# Patient Record
Sex: FEMALE | Race: WHITE | NOT HISPANIC OR LATINO
[De-identification: names, ages, dates, MRNs, and addresses within clinical notes are randomized per-mention and may not be internally consistent; named-entity substitution may affect disease eponyms.]

---

## 2017-03-02 ENCOUNTER — RX RENEWAL (OUTPATIENT)
Age: 67
End: 2017-03-02

## 2017-03-29 ENCOUNTER — APPOINTMENT (OUTPATIENT)
Dept: INTERNAL MEDICINE | Facility: CLINIC | Age: 67
End: 2017-03-29

## 2017-03-29 VITALS
BODY MASS INDEX: 26.99 KG/M2 | DIASTOLIC BLOOD PRESSURE: 60 MMHG | SYSTOLIC BLOOD PRESSURE: 120 MMHG | WEIGHT: 162 LBS | HEART RATE: 66 BPM | HEIGHT: 65 IN

## 2017-04-04 ENCOUNTER — TRANSCRIPTION ENCOUNTER (OUTPATIENT)
Age: 67
End: 2017-04-04

## 2017-04-09 ENCOUNTER — TRANSCRIPTION ENCOUNTER (OUTPATIENT)
Age: 67
End: 2017-04-09

## 2017-04-21 ENCOUNTER — APPOINTMENT (OUTPATIENT)
Dept: OPHTHALMOLOGY | Facility: CLINIC | Age: 67
End: 2017-04-21

## 2017-04-25 ENCOUNTER — APPOINTMENT (OUTPATIENT)
Dept: OBGYN | Facility: CLINIC | Age: 67
End: 2017-04-25

## 2017-04-25 ENCOUNTER — MEDICATION RENEWAL (OUTPATIENT)
Age: 67
End: 2017-04-25

## 2017-04-25 VITALS
DIASTOLIC BLOOD PRESSURE: 62 MMHG | HEIGHT: 65 IN | SYSTOLIC BLOOD PRESSURE: 132 MMHG | BODY MASS INDEX: 26.66 KG/M2 | WEIGHT: 160 LBS

## 2017-04-25 DIAGNOSIS — Z01.419 ENCOUNTER FOR GYNECOLOGICAL EXAMINATION (GENERAL) (ROUTINE) W/OUT ABNORMAL FINDINGS: ICD-10-CM

## 2017-04-30 LAB — CYTOLOGY CVX/VAG DOC THIN PREP: NORMAL

## 2017-06-20 ENCOUNTER — RX RENEWAL (OUTPATIENT)
Age: 67
End: 2017-06-20

## 2017-08-31 ENCOUNTER — MEDICATION RENEWAL (OUTPATIENT)
Age: 67
End: 2017-08-31

## 2017-08-31 ENCOUNTER — TRANSCRIPTION ENCOUNTER (OUTPATIENT)
Age: 67
End: 2017-08-31

## 2017-10-02 ENCOUNTER — APPOINTMENT (OUTPATIENT)
Dept: INTERNAL MEDICINE | Facility: CLINIC | Age: 67
End: 2017-10-02

## 2017-11-20 PROBLEM — H04.202 LEFT EPIPHORA: Status: ACTIVE | Noted: 2017-04-21

## 2017-11-21 ENCOUNTER — APPOINTMENT (OUTPATIENT)
Dept: OPHTHALMOLOGY | Facility: CLINIC | Age: 67
End: 2017-11-21
Payer: MEDICARE

## 2017-11-21 DIAGNOSIS — H04.202 UNSPECIFIED EPIPHORA, LEFT SIDE: ICD-10-CM

## 2017-11-21 DIAGNOSIS — H16.111: ICD-10-CM

## 2017-11-21 PROCEDURE — 92014 COMPRE OPH EXAM EST PT 1/>: CPT

## 2017-11-21 PROCEDURE — 92134 CPTRZ OPH DX IMG PST SGM RTA: CPT

## 2017-11-22 ENCOUNTER — APPOINTMENT (OUTPATIENT)
Dept: INTERNAL MEDICINE | Facility: CLINIC | Age: 67
End: 2017-11-22
Payer: MEDICARE

## 2017-11-22 VITALS
HEIGHT: 62 IN | WEIGHT: 161 LBS | BODY MASS INDEX: 29.63 KG/M2 | HEART RATE: 72 BPM | SYSTOLIC BLOOD PRESSURE: 120 MMHG | DIASTOLIC BLOOD PRESSURE: 60 MMHG

## 2017-11-22 DIAGNOSIS — B97.89 ACUTE UPPER RESPIRATORY INFECTION, UNSPECIFIED: ICD-10-CM

## 2017-11-22 DIAGNOSIS — J06.9 ACUTE UPPER RESPIRATORY INFECTION, UNSPECIFIED: ICD-10-CM

## 2017-11-22 DIAGNOSIS — Z87.2 PERSONAL HISTORY OF DISEASES OF THE SKIN AND SUBCUTANEOUS TISSUE: ICD-10-CM

## 2017-11-22 PROCEDURE — G0439: CPT

## 2017-11-24 LAB
ALBUMIN SERPL ELPH-MCNC: 4.1 G/DL
ALT SERPL-CCNC: 7 U/L
ANION GAP SERPL CALC-SCNC: 13 MMOL/L
BASOPHILS # BLD AUTO: 0.05 K/UL
BASOPHILS NFR BLD AUTO: 0.7 %
BILIRUB SERPL-MCNC: 0.4 MG/DL
BUN SERPL-MCNC: 29 MG/DL
CALCIUM SERPL-MCNC: 9 MG/DL
CHLORIDE SERPL-SCNC: 102 MMOL/L
CHOLEST SERPL-MCNC: 163 MG/DL
CHOLEST/HDLC SERPL: 3 RATIO
CO2 SERPL-SCNC: 24 MMOL/L
CREAT SERPL-MCNC: 1.2 MG/DL
EOSINOPHIL # BLD AUTO: 0.16 K/UL
EOSINOPHIL NFR BLD AUTO: 2.3 %
GLUCOSE SERPL-MCNC: 89 MG/DL
HBA1C MFR BLD HPLC: 5.7 %
HCT VFR BLD CALC: 40.5 %
HDLC SERPL-MCNC: 54 MG/DL
HGB BLD-MCNC: 13 G/DL
IMM GRANULOCYTES NFR BLD AUTO: 0.1 %
LDLC SERPL CALC-MCNC: 86 MG/DL
LYMPHOCYTES # BLD AUTO: 2.5 K/UL
LYMPHOCYTES NFR BLD AUTO: 36.5 %
MAN DIFF?: NORMAL
MCHC RBC-ENTMCNC: 30.3 PG
MCHC RBC-ENTMCNC: 32.1 GM/DL
MCV RBC AUTO: 94.4 FL
MONOCYTES # BLD AUTO: 0.57 K/UL
MONOCYTES NFR BLD AUTO: 8.3 %
NEUTROPHILS # BLD AUTO: 3.55 K/UL
NEUTROPHILS NFR BLD AUTO: 52.1 %
PLATELET # BLD AUTO: 268 K/UL
POTASSIUM SERPL-SCNC: 4.2 MMOL/L
PROT SERPL-MCNC: 7.4 G/DL
RBC # BLD: 4.29 M/UL
RBC # FLD: 13.2 %
SODIUM SERPL-SCNC: 139 MMOL/L
TRIGL SERPL-MCNC: 114 MG/DL
TSH SERPL-ACNC: 1.91 UIU/ML
WBC # FLD AUTO: 6.84 K/UL

## 2017-11-27 ENCOUNTER — FORM ENCOUNTER (OUTPATIENT)
Age: 67
End: 2017-11-27

## 2017-11-28 ENCOUNTER — APPOINTMENT (OUTPATIENT)
Dept: MAMMOGRAPHY | Facility: CLINIC | Age: 67
End: 2017-11-28
Payer: MEDICARE

## 2017-11-28 ENCOUNTER — APPOINTMENT (OUTPATIENT)
Dept: ULTRASOUND IMAGING | Facility: CLINIC | Age: 67
End: 2017-11-28
Payer: MEDICARE

## 2017-11-28 ENCOUNTER — OUTPATIENT (OUTPATIENT)
Dept: OUTPATIENT SERVICES | Facility: HOSPITAL | Age: 67
LOS: 1 days | End: 2017-11-28
Payer: MEDICARE

## 2017-11-28 DIAGNOSIS — Z98.89 OTHER SPECIFIED POSTPROCEDURAL STATES: Chronic | ICD-10-CM

## 2017-11-28 DIAGNOSIS — Z00.8 ENCOUNTER FOR OTHER GENERAL EXAMINATION: ICD-10-CM

## 2017-11-28 DIAGNOSIS — Z00.00 ENCOUNTER FOR GENERAL ADULT MEDICAL EXAMINATION WITHOUT ABNORMAL FINDINGS: ICD-10-CM

## 2017-11-28 DIAGNOSIS — Z98.49 CATARACT EXTRACTION STATUS, UNSPECIFIED EYE: Chronic | ICD-10-CM

## 2017-11-28 PROCEDURE — 77063 BREAST TOMOSYNTHESIS BI: CPT

## 2017-11-28 PROCEDURE — 77063 BREAST TOMOSYNTHESIS BI: CPT | Mod: 26

## 2017-11-28 PROCEDURE — G0202: CPT | Mod: 26

## 2017-11-28 PROCEDURE — 77067 SCR MAMMO BI INCL CAD: CPT

## 2017-12-03 ENCOUNTER — FORM ENCOUNTER (OUTPATIENT)
Age: 67
End: 2017-12-03

## 2017-12-04 ENCOUNTER — APPOINTMENT (OUTPATIENT)
Dept: RADIOLOGY | Facility: IMAGING CENTER | Age: 67
End: 2017-12-04
Payer: MEDICARE

## 2017-12-04 ENCOUNTER — APPOINTMENT (OUTPATIENT)
Dept: CT IMAGING | Facility: IMAGING CENTER | Age: 67
End: 2017-12-04
Payer: MEDICARE

## 2017-12-04 ENCOUNTER — OUTPATIENT (OUTPATIENT)
Dept: OUTPATIENT SERVICES | Facility: HOSPITAL | Age: 67
LOS: 1 days | End: 2017-12-04
Payer: MEDICARE

## 2017-12-04 DIAGNOSIS — Z98.89 OTHER SPECIFIED POSTPROCEDURAL STATES: Chronic | ICD-10-CM

## 2017-12-04 DIAGNOSIS — Z98.49 CATARACT EXTRACTION STATUS, UNSPECIFIED EYE: Chronic | ICD-10-CM

## 2017-12-04 DIAGNOSIS — Z00.00 ENCOUNTER FOR GENERAL ADULT MEDICAL EXAMINATION WITHOUT ABNORMAL FINDINGS: ICD-10-CM

## 2017-12-04 PROCEDURE — 71250 CT THORAX DX C-: CPT | Mod: 26

## 2017-12-04 PROCEDURE — 71100 X-RAY EXAM RIBS UNI 2 VIEWS: CPT | Mod: 26

## 2017-12-04 PROCEDURE — 71020: CPT | Mod: 26

## 2017-12-04 PROCEDURE — 71100 X-RAY EXAM RIBS UNI 2 VIEWS: CPT

## 2017-12-04 PROCEDURE — 71250 CT THORAX DX C-: CPT

## 2017-12-04 PROCEDURE — 71046 X-RAY EXAM CHEST 2 VIEWS: CPT

## 2017-12-07 ENCOUNTER — OUTPATIENT (OUTPATIENT)
Dept: OUTPATIENT SERVICES | Facility: HOSPITAL | Age: 67
LOS: 1 days | End: 2017-12-07
Payer: MEDICARE

## 2017-12-07 VITALS
SYSTOLIC BLOOD PRESSURE: 124 MMHG | HEIGHT: 61 IN | DIASTOLIC BLOOD PRESSURE: 74 MMHG | RESPIRATION RATE: 16 BRPM | TEMPERATURE: 98 F | WEIGHT: 160.94 LBS | HEART RATE: 68 BPM

## 2017-12-07 DIAGNOSIS — Z98.89 OTHER SPECIFIED POSTPROCEDURAL STATES: Chronic | ICD-10-CM

## 2017-12-07 DIAGNOSIS — R91.8 OTHER NONSPECIFIC ABNORMAL FINDING OF LUNG FIELD: ICD-10-CM

## 2017-12-07 DIAGNOSIS — Z98.49 CATARACT EXTRACTION STATUS, UNSPECIFIED EYE: Chronic | ICD-10-CM

## 2017-12-07 LAB
ALBUMIN SERPL ELPH-MCNC: 4.3 G/DL — SIGNIFICANT CHANGE UP (ref 3.3–5)
ALP SERPL-CCNC: 63 U/L — SIGNIFICANT CHANGE UP (ref 40–120)
ALT FLD-CCNC: 12 U/L — SIGNIFICANT CHANGE UP (ref 4–33)
AST SERPL-CCNC: 14 U/L — SIGNIFICANT CHANGE UP (ref 4–32)
BILIRUB SERPL-MCNC: 0.2 MG/DL — SIGNIFICANT CHANGE UP (ref 0.2–1.2)
BUN SERPL-MCNC: 32 MG/DL — HIGH (ref 7–23)
CALCIUM SERPL-MCNC: 8.8 MG/DL — SIGNIFICANT CHANGE UP (ref 8.4–10.5)
CHLORIDE SERPL-SCNC: 103 MMOL/L — SIGNIFICANT CHANGE UP (ref 98–107)
CO2 SERPL-SCNC: 26 MMOL/L — SIGNIFICANT CHANGE UP (ref 22–31)
CREAT SERPL-MCNC: 0.98 MG/DL — SIGNIFICANT CHANGE UP (ref 0.5–1.3)
GLUCOSE SERPL-MCNC: 92 MG/DL — SIGNIFICANT CHANGE UP (ref 70–99)
HCT VFR BLD CALC: 40.1 % — SIGNIFICANT CHANGE UP (ref 34.5–45)
HGB BLD-MCNC: 13.3 G/DL — SIGNIFICANT CHANGE UP (ref 11.5–15.5)
MCHC RBC-ENTMCNC: 31.5 PG — SIGNIFICANT CHANGE UP (ref 27–34)
MCHC RBC-ENTMCNC: 33.2 % — SIGNIFICANT CHANGE UP (ref 32–36)
MCV RBC AUTO: 95 FL — SIGNIFICANT CHANGE UP (ref 80–100)
NRBC # FLD: 0 — SIGNIFICANT CHANGE UP
PLATELET # BLD AUTO: 248 K/UL — SIGNIFICANT CHANGE UP (ref 150–400)
PMV BLD: 10.3 FL — SIGNIFICANT CHANGE UP (ref 7–13)
POTASSIUM SERPL-MCNC: 4 MMOL/L — SIGNIFICANT CHANGE UP (ref 3.5–5.3)
POTASSIUM SERPL-SCNC: 4 MMOL/L — SIGNIFICANT CHANGE UP (ref 3.5–5.3)
PROT SERPL-MCNC: 7.4 G/DL — SIGNIFICANT CHANGE UP (ref 6–8.3)
RBC # BLD: 4.22 M/UL — SIGNIFICANT CHANGE UP (ref 3.8–5.2)
RBC # FLD: 12.6 % — SIGNIFICANT CHANGE UP (ref 10.3–14.5)
SODIUM SERPL-SCNC: 141 MMOL/L — SIGNIFICANT CHANGE UP (ref 135–145)
WBC # BLD: 6.12 K/UL — SIGNIFICANT CHANGE UP (ref 3.8–10.5)
WBC # FLD AUTO: 6.12 K/UL — SIGNIFICANT CHANGE UP (ref 3.8–10.5)

## 2017-12-07 PROCEDURE — 93010 ELECTROCARDIOGRAM REPORT: CPT

## 2017-12-07 RX ORDER — SODIUM CHLORIDE 9 MG/ML
1000 INJECTION, SOLUTION INTRAVENOUS
Qty: 0 | Refills: 0 | Status: DISCONTINUED | OUTPATIENT
Start: 2017-12-12 | End: 2017-12-27

## 2017-12-07 NOTE — H&P PST ADULT - MUSCULOSKELETAL
details… detailed exam ROM intact/normal strength/no calf tenderness/no joint swelling/no joint erythema/no joint warmth

## 2017-12-07 NOTE — H&P PST ADULT - REASON FOR ADMISSION
" I am having surgery for the lung ". " I am having surgery for the lung ". Pt is deaf , [facility]  [facility] in attendance .

## 2017-12-07 NOTE — H&P PST ADULT - LYMPHATIC
posterior cervical L/anterior cervical L/supraclavicular L/supraclavicular R/posterior cervical R/anterior cervical R

## 2017-12-07 NOTE — H&P PST ADULT - FAMILY HISTORY
Mother  Still living? No  Family history of breast cancer, Age at diagnosis: Age Unknown     Father  Still living? No  Family history of stomach cancer, Age at diagnosis: Age Unknown  Family history of heart disease, Age at diagnosis: Age Unknown

## 2017-12-07 NOTE — H&P PST ADULT - RS GEN PE MLT RESP DETAILS PC
no rhonchi/no rales/no wheezes/clear to auscultation bilaterally/breath sounds equal/respirations non-labored/good air movement

## 2017-12-07 NOTE — H&P PST ADULT - HISTORY OF PRESENT ILLNESS
This is a 67 y.o. female who presented to PCP complaining of right lateral chest pain , sharp . Pt had chest x-ray , CT of chest. Pt has other nonspecified abnormal finding of lung field now for surgery . Pt offers no complaints in pst .

## 2017-12-11 ENCOUNTER — APPOINTMENT (OUTPATIENT)
Dept: PULMONOLOGY | Facility: CLINIC | Age: 67
End: 2017-12-11
Payer: MEDICARE

## 2017-12-11 VITALS
BODY MASS INDEX: 30.18 KG/M2 | HEART RATE: 66 BPM | TEMPERATURE: 97.9 F | HEIGHT: 62 IN | WEIGHT: 164 LBS | DIASTOLIC BLOOD PRESSURE: 60 MMHG | SYSTOLIC BLOOD PRESSURE: 120 MMHG | RESPIRATION RATE: 15 BRPM

## 2017-12-11 PROCEDURE — 99203 OFFICE O/P NEW LOW 30 MIN: CPT

## 2017-12-11 RX ORDER — FLUOROMETHOLONE 1 MG/ML
0.1 SOLUTION/ DROPS OPHTHALMIC
Qty: 1 | Refills: 5 | Status: DISCONTINUED | COMMUNITY
Start: 2017-04-21 | End: 2017-12-11

## 2017-12-11 NOTE — ASU PATIENT PROFILE, ADULT - REASON FOR ADMISSION, PROFILE
Pt signed she is having a scope go down her throat because of pain when swallowing and with dry cough

## 2017-12-11 NOTE — ASU PATIENT PROFILE, ADULT - PSH
H/O eye surgery    S/P cataract surgery    S/P thyroid surgery    S/P wrist surgery H/O eye surgery    Internal derangement of right knee  ligament 1999  S/P cataract surgery    S/P thyroid surgery    S/P wrist surgery

## 2017-12-12 ENCOUNTER — OUTPATIENT (OUTPATIENT)
Dept: OUTPATIENT SERVICES | Facility: HOSPITAL | Age: 67
LOS: 1 days | Discharge: HOME | End: 2017-12-12
Payer: MEDICARE

## 2017-12-12 ENCOUNTER — RESULT REVIEW (OUTPATIENT)
Age: 67
End: 2017-12-12

## 2017-12-12 ENCOUNTER — APPOINTMENT (OUTPATIENT)
Dept: PULMONOLOGY | Facility: HOSPITAL | Age: 67
End: 2017-12-12

## 2017-12-12 VITALS
HEART RATE: 60 BPM | TEMPERATURE: 98 F | DIASTOLIC BLOOD PRESSURE: 60 MMHG | RESPIRATION RATE: 16 BRPM | OXYGEN SATURATION: 100 % | SYSTOLIC BLOOD PRESSURE: 114 MMHG

## 2017-12-12 VITALS
SYSTOLIC BLOOD PRESSURE: 138 MMHG | HEIGHT: 61 IN | OXYGEN SATURATION: 96 % | HEART RATE: 65 BPM | RESPIRATION RATE: 14 BRPM | WEIGHT: 160.94 LBS | TEMPERATURE: 98 F | DIASTOLIC BLOOD PRESSURE: 62 MMHG

## 2017-12-12 DIAGNOSIS — M23.91 UNSPECIFIED INTERNAL DERANGEMENT OF RIGHT KNEE: Chronic | ICD-10-CM

## 2017-12-12 DIAGNOSIS — Z98.89 OTHER SPECIFIED POSTPROCEDURAL STATES: Chronic | ICD-10-CM

## 2017-12-12 DIAGNOSIS — Z98.49 CATARACT EXTRACTION STATUS, UNSPECIFIED EYE: Chronic | ICD-10-CM

## 2017-12-12 DIAGNOSIS — R91.8 OTHER NONSPECIFIC ABNORMAL FINDING OF LUNG FIELD: ICD-10-CM

## 2017-12-12 LAB
APTT BLD: 33.2 SEC
INR PPP: 0.92 RATIO
PT BLD: 10.4 SEC

## 2017-12-12 PROCEDURE — 31652 BRONCH EBUS SAMPLNG 1/2 NODE: CPT | Mod: GC

## 2017-12-12 PROCEDURE — 88305 TISSUE EXAM BY PATHOLOGIST: CPT | Mod: 26

## 2017-12-12 PROCEDURE — 88112 CYTOPATH CELL ENHANCE TECH: CPT | Mod: 26,59

## 2017-12-12 PROCEDURE — 88305 TISSUE EXAM BY PATHOLOGIST: CPT | Mod: 26,59

## 2017-12-12 PROCEDURE — 31625 BRONCHOSCOPY W/BIOPSY(S): CPT | Mod: GC

## 2017-12-12 PROCEDURE — 31627 NAVIGATIONAL BRONCHOSCOPY: CPT | Mod: GC

## 2017-12-12 PROCEDURE — 88342 IMHCHEM/IMCYTCHM 1ST ANTB: CPT | Mod: 26

## 2017-12-12 PROCEDURE — 71010: CPT | Mod: 26

## 2017-12-12 PROCEDURE — 88341 IMHCHEM/IMCYTCHM EA ADD ANTB: CPT | Mod: 26

## 2017-12-12 PROCEDURE — 88173 CYTOPATH EVAL FNA REPORT: CPT | Mod: 26

## 2017-12-12 RX ORDER — ONDANSETRON 8 MG/1
4 TABLET, FILM COATED ORAL ONCE
Qty: 0 | Refills: 0 | Status: DISCONTINUED | OUTPATIENT
Start: 2017-12-12 | End: 2017-12-12

## 2017-12-12 RX ORDER — FENTANYL CITRATE 50 UG/ML
25 INJECTION INTRAVENOUS
Qty: 0 | Refills: 0 | Status: DISCONTINUED | OUTPATIENT
Start: 2017-12-12 | End: 2017-12-12

## 2017-12-12 RX ADMIN — FENTANYL CITRATE 25 MICROGRAM(S): 50 INJECTION INTRAVENOUS at 15:50

## 2017-12-12 RX ADMIN — SODIUM CHLORIDE 30 MILLILITER(S): 9 INJECTION, SOLUTION INTRAVENOUS at 16:40

## 2017-12-12 RX ADMIN — FENTANYL CITRATE 25 MICROGRAM(S): 50 INJECTION INTRAVENOUS at 16:05

## 2017-12-12 NOTE — ASU DISCHARGE PLAN (ADULT/PEDIATRIC). - MEDICATION SUMMARY - MEDICATIONS TO TAKE
I will START or STAY ON the medications listed below when I get home from the hospital:    calcium with D  1 tab orally daily  -- Indication: For Nonspecific abnormal findings on radiological and other examination of lung field    simvastatin 20 mg oral tablet  -- 1 tab(s) by mouth once a day (at bedtime)  -- Indication: For Nonspecific abnormal findings on radiological and other examination of lung field

## 2017-12-12 NOTE — ASU DISCHARGE PLAN (ADULT/PEDIATRIC). - POST OP PHONE #
[phone] pt. granted permission to leave message /and or speak with whoever answers the phone. PT is deaf and a  will .

## 2017-12-12 NOTE — ASU DISCHARGE PLAN (ADULT/PEDIATRIC). - NOTIFY
Unable to Urinate/Persistent Nausea and Vomiting/Bleeding that does not stop Unable to Urinate/Persistent Nausea and Vomiting/Bleeding that does not stop/Inability to Tolerate Liquids or Foods/Swelling that continues/Fever greater than 101

## 2017-12-12 NOTE — ASU DISCHARGE PLAN (ADULT/PEDIATRIC). - SPECIAL INSTRUCTIONS
If you cough up blood tinged sputum its normal . If more than a tablespoon of bloody sputum notify MD

## 2017-12-20 ENCOUNTER — RESULT REVIEW (OUTPATIENT)
Age: 67
End: 2017-12-20

## 2017-12-22 ENCOUNTER — APPOINTMENT (OUTPATIENT)
Dept: PULMONOLOGY | Facility: CLINIC | Age: 67
End: 2017-12-22
Payer: MEDICARE

## 2017-12-22 ENCOUNTER — OUTPATIENT (OUTPATIENT)
Dept: OUTPATIENT SERVICES | Facility: HOSPITAL | Age: 67
LOS: 1 days | End: 2017-12-22
Payer: MEDICARE

## 2017-12-22 ENCOUNTER — APPOINTMENT (OUTPATIENT)
Dept: NUCLEAR MEDICINE | Facility: IMAGING CENTER | Age: 67
End: 2017-12-22
Payer: MEDICARE

## 2017-12-22 ENCOUNTER — APPOINTMENT (OUTPATIENT)
Dept: MRI IMAGING | Facility: IMAGING CENTER | Age: 67
End: 2017-12-22
Payer: MEDICARE

## 2017-12-22 VITALS
HEIGHT: 62 IN | DIASTOLIC BLOOD PRESSURE: 80 MMHG | BODY MASS INDEX: 29.26 KG/M2 | HEART RATE: 70 BPM | WEIGHT: 159 LBS | SYSTOLIC BLOOD PRESSURE: 120 MMHG | TEMPERATURE: 98.2 F | RESPIRATION RATE: 15 BRPM

## 2017-12-22 DIAGNOSIS — Z98.89 OTHER SPECIFIED POSTPROCEDURAL STATES: Chronic | ICD-10-CM

## 2017-12-22 DIAGNOSIS — Z98.49 CATARACT EXTRACTION STATUS, UNSPECIFIED EYE: Chronic | ICD-10-CM

## 2017-12-22 DIAGNOSIS — R91.1 SOLITARY PULMONARY NODULE: ICD-10-CM

## 2017-12-22 DIAGNOSIS — M23.91 UNSPECIFIED INTERNAL DERANGEMENT OF RIGHT KNEE: Chronic | ICD-10-CM

## 2017-12-22 PROCEDURE — 70551 MRI BRAIN STEM W/O DYE: CPT | Mod: 26

## 2017-12-22 PROCEDURE — 78815 PET IMAGE W/CT SKULL-THIGH: CPT | Mod: 26,PI

## 2017-12-22 PROCEDURE — A9552: CPT

## 2017-12-22 PROCEDURE — 70551 MRI BRAIN STEM W/O DYE: CPT

## 2017-12-22 PROCEDURE — 78815 PET IMAGE W/CT SKULL-THIGH: CPT

## 2017-12-22 PROCEDURE — 99212 OFFICE O/P EST SF 10 MIN: CPT

## 2017-12-26 ENCOUNTER — OUTPATIENT (OUTPATIENT)
Dept: OUTPATIENT SERVICES | Facility: HOSPITAL | Age: 67
LOS: 1 days | Discharge: HOME | End: 2017-12-26

## 2017-12-26 DIAGNOSIS — C34.90 MALIGNANT NEOPLASM OF UNSPECIFIED PART OF UNSPECIFIED BRONCHUS OR LUNG: ICD-10-CM

## 2017-12-26 DIAGNOSIS — C01 MALIGNANT NEOPLASM OF BASE OF TONGUE: ICD-10-CM

## 2017-12-26 DIAGNOSIS — Z98.49 CATARACT EXTRACTION STATUS, UNSPECIFIED EYE: Chronic | ICD-10-CM

## 2017-12-26 DIAGNOSIS — Z98.89 OTHER SPECIFIED POSTPROCEDURAL STATES: Chronic | ICD-10-CM

## 2017-12-26 DIAGNOSIS — M23.91 UNSPECIFIED INTERNAL DERANGEMENT OF RIGHT KNEE: Chronic | ICD-10-CM

## 2017-12-29 ENCOUNTER — APPOINTMENT (OUTPATIENT)
Dept: HEMATOLOGY ONCOLOGY | Facility: CLINIC | Age: 67
End: 2017-12-29
Payer: MEDICARE

## 2017-12-29 ENCOUNTER — RESULT REVIEW (OUTPATIENT)
Age: 67
End: 2017-12-29

## 2017-12-29 VITALS
TEMPERATURE: 98 F | DIASTOLIC BLOOD PRESSURE: 83 MMHG | HEART RATE: 69 BPM | SYSTOLIC BLOOD PRESSURE: 144 MMHG | HEIGHT: 61.93 IN | BODY MASS INDEX: 29.62 KG/M2 | OXYGEN SATURATION: 96 % | WEIGHT: 160.94 LBS | RESPIRATION RATE: 16 BRPM

## 2017-12-29 DIAGNOSIS — C34.31 MALIGNANT NEOPLASM OF LOWER LOBE, RIGHT BRONCHUS OR LUNG: ICD-10-CM

## 2017-12-29 DIAGNOSIS — G93.0 CEREBRAL CYSTS: ICD-10-CM

## 2017-12-29 LAB
BASOPHILS # BLD AUTO: 0.1 K/UL — SIGNIFICANT CHANGE UP (ref 0–0.2)
BASOPHILS NFR BLD AUTO: 1 % — SIGNIFICANT CHANGE UP (ref 0–2)
EOSINOPHIL # BLD AUTO: 0.1 K/UL — SIGNIFICANT CHANGE UP (ref 0–0.5)
EOSINOPHIL NFR BLD AUTO: 1.9 % — SIGNIFICANT CHANGE UP (ref 0–6)
HCT VFR BLD CALC: 41.7 % — SIGNIFICANT CHANGE UP (ref 34.5–45)
HGB BLD-MCNC: 14 G/DL — SIGNIFICANT CHANGE UP (ref 11.5–15.5)
LYMPHOCYTES # BLD AUTO: 2.5 K/UL — SIGNIFICANT CHANGE UP (ref 1–3.3)
LYMPHOCYTES # BLD AUTO: 37.4 % — SIGNIFICANT CHANGE UP (ref 13–44)
MCHC RBC-ENTMCNC: 31.9 PG — SIGNIFICANT CHANGE UP (ref 27–34)
MCHC RBC-ENTMCNC: 33.7 G/DL — SIGNIFICANT CHANGE UP (ref 32–36)
MCV RBC AUTO: 94.9 FL — SIGNIFICANT CHANGE UP (ref 80–100)
MONOCYTES # BLD AUTO: 0.5 K/UL — SIGNIFICANT CHANGE UP (ref 0–0.9)
MONOCYTES NFR BLD AUTO: 7.8 % — SIGNIFICANT CHANGE UP (ref 2–14)
NEUTROPHILS # BLD AUTO: 3.5 K/UL — SIGNIFICANT CHANGE UP (ref 1.8–7.4)
NEUTROPHILS NFR BLD AUTO: 52 % — SIGNIFICANT CHANGE UP (ref 43–77)
PLATELET # BLD AUTO: 282 K/UL — SIGNIFICANT CHANGE UP (ref 150–400)
RBC # BLD: 4.39 M/UL — SIGNIFICANT CHANGE UP (ref 3.8–5.2)
RBC # FLD: 11.5 % — SIGNIFICANT CHANGE UP (ref 10.3–14.5)
WBC # BLD: 6.7 K/UL — SIGNIFICANT CHANGE UP (ref 3.8–10.5)
WBC # FLD AUTO: 6.7 K/UL — SIGNIFICANT CHANGE UP (ref 3.8–10.5)

## 2017-12-29 PROCEDURE — 99205 OFFICE O/P NEW HI 60 MIN: CPT

## 2018-01-02 LAB
ALBUMIN SERPL ELPH-MCNC: 4.5 G/DL
ALP BLD-CCNC: 77 U/L
ALT SERPL-CCNC: 20 U/L
ANION GAP SERPL CALC-SCNC: 14 MMOL/L
AST SERPL-CCNC: 16 U/L
BILIRUB SERPL-MCNC: 0.3 MG/DL
BUN SERPL-MCNC: 27 MG/DL
CALCIUM SERPL-MCNC: 9.9 MG/DL
CEA SERPL-MCNC: 6.4 NG/ML
CHLORIDE SERPL-SCNC: 100 MMOL/L
CO2 SERPL-SCNC: 27 MMOL/L
CREAT SERPL-MCNC: 0.91 MG/DL
GLUCOSE SERPL-MCNC: 96 MG/DL
HAV IGM SER QL: NONREACTIVE
HBV CORE IGM SER QL: NONREACTIVE
HBV SURFACE AG SER QL: NONREACTIVE
HCV AB SER QL: NONREACTIVE
HCV S/CO RATIO: 0.06 S/CO
POTASSIUM SERPL-SCNC: 4.4 MMOL/L
PROT SERPL-MCNC: 7.8 G/DL
SODIUM SERPL-SCNC: 141 MMOL/L
TSH SERPL-ACNC: 2.19 UIU/ML

## 2018-01-02 RX ORDER — PROMETHAZINE HYDROCHLORIDE 6.25 MG/5ML
6.25 SOLUTION ORAL
Qty: 1 | Refills: 0 | Status: DISCONTINUED | COMMUNITY
Start: 2017-11-22 | End: 2018-01-02

## 2018-01-17 ENCOUNTER — APPOINTMENT (OUTPATIENT)
Dept: HEMATOLOGY ONCOLOGY | Facility: CLINIC | Age: 68
End: 2018-01-17

## 2018-01-25 ENCOUNTER — APPOINTMENT (OUTPATIENT)
Dept: HEMATOLOGY ONCOLOGY | Facility: CLINIC | Age: 68
End: 2018-01-25
Payer: MEDICARE

## 2018-01-25 ENCOUNTER — LABORATORY RESULT (OUTPATIENT)
Age: 68
End: 2018-01-25

## 2018-01-25 ENCOUNTER — RESULT REVIEW (OUTPATIENT)
Age: 68
End: 2018-01-25

## 2018-01-25 VITALS
OXYGEN SATURATION: 99 % | TEMPERATURE: 97.5 F | SYSTOLIC BLOOD PRESSURE: 145 MMHG | HEART RATE: 63 BPM | BODY MASS INDEX: 29.71 KG/M2 | DIASTOLIC BLOOD PRESSURE: 80 MMHG | WEIGHT: 162.04 LBS | RESPIRATION RATE: 16 BRPM

## 2018-01-25 LAB
BASOPHILS # BLD AUTO: 0.1 K/UL — SIGNIFICANT CHANGE UP (ref 0–0.2)
BASOPHILS NFR BLD AUTO: 0.8 % — SIGNIFICANT CHANGE UP (ref 0–2)
EOSINOPHIL # BLD AUTO: 0.1 K/UL — SIGNIFICANT CHANGE UP (ref 0–0.5)
EOSINOPHIL NFR BLD AUTO: 1.6 % — SIGNIFICANT CHANGE UP (ref 0–6)
HCT VFR BLD CALC: 37 % — SIGNIFICANT CHANGE UP (ref 34.5–45)
HGB BLD-MCNC: 12.9 G/DL — SIGNIFICANT CHANGE UP (ref 11.5–15.5)
LYMPHOCYTES # BLD AUTO: 2.2 K/UL — SIGNIFICANT CHANGE UP (ref 1–3.3)
LYMPHOCYTES # BLD AUTO: 33.8 % — SIGNIFICANT CHANGE UP (ref 13–44)
MCHC RBC-ENTMCNC: 33.2 PG — SIGNIFICANT CHANGE UP (ref 27–34)
MCHC RBC-ENTMCNC: 35 G/DL — SIGNIFICANT CHANGE UP (ref 32–36)
MCV RBC AUTO: 95.1 FL — SIGNIFICANT CHANGE UP (ref 80–100)
MONOCYTES # BLD AUTO: 0.4 K/UL — SIGNIFICANT CHANGE UP (ref 0–0.9)
MONOCYTES NFR BLD AUTO: 6.4 % — SIGNIFICANT CHANGE UP (ref 2–14)
NEUTROPHILS # BLD AUTO: 3.8 K/UL — SIGNIFICANT CHANGE UP (ref 1.8–7.4)
NEUTROPHILS NFR BLD AUTO: 57.3 % — SIGNIFICANT CHANGE UP (ref 43–77)
PLATELET # BLD AUTO: 233 K/UL — SIGNIFICANT CHANGE UP (ref 150–400)
RBC # BLD: 3.89 M/UL — SIGNIFICANT CHANGE UP (ref 3.8–5.2)
RBC # FLD: 11.3 % — SIGNIFICANT CHANGE UP (ref 10.3–14.5)
WBC # BLD: 6.6 K/UL — SIGNIFICANT CHANGE UP (ref 3.8–10.5)
WBC # FLD AUTO: 6.6 K/UL — SIGNIFICANT CHANGE UP (ref 3.8–10.5)

## 2018-01-25 PROCEDURE — 99214 OFFICE O/P EST MOD 30 MIN: CPT

## 2018-02-05 ENCOUNTER — OTHER (OUTPATIENT)
Age: 68
End: 2018-02-05

## 2018-02-28 ENCOUNTER — OUTPATIENT (OUTPATIENT)
Dept: OUTPATIENT SERVICES | Facility: HOSPITAL | Age: 68
LOS: 1 days | Discharge: HOME | End: 2018-02-28

## 2018-02-28 DIAGNOSIS — Z98.49 CATARACT EXTRACTION STATUS, UNSPECIFIED EYE: Chronic | ICD-10-CM

## 2018-02-28 DIAGNOSIS — C34.90 MALIGNANT NEOPLASM OF UNSPECIFIED PART OF UNSPECIFIED BRONCHUS OR LUNG: ICD-10-CM

## 2018-02-28 DIAGNOSIS — Z98.89 OTHER SPECIFIED POSTPROCEDURAL STATES: Chronic | ICD-10-CM

## 2018-02-28 DIAGNOSIS — M23.91 UNSPECIFIED INTERNAL DERANGEMENT OF RIGHT KNEE: Chronic | ICD-10-CM

## 2018-03-06 ENCOUNTER — LABORATORY RESULT (OUTPATIENT)
Age: 68
End: 2018-03-06

## 2018-03-06 ENCOUNTER — APPOINTMENT (OUTPATIENT)
Dept: HEMATOLOGY ONCOLOGY | Facility: CLINIC | Age: 68
End: 2018-03-06
Payer: MEDICARE

## 2018-03-06 ENCOUNTER — RESULT REVIEW (OUTPATIENT)
Age: 68
End: 2018-03-06

## 2018-03-06 VITALS
TEMPERATURE: 97.6 F | WEIGHT: 164 LBS | DIASTOLIC BLOOD PRESSURE: 78 MMHG | RESPIRATION RATE: 16 BRPM | BODY MASS INDEX: 30.06 KG/M2 | OXYGEN SATURATION: 98 % | SYSTOLIC BLOOD PRESSURE: 147 MMHG | HEART RATE: 65 BPM

## 2018-03-06 LAB
BASOPHILS # BLD AUTO: 0.1 K/UL — SIGNIFICANT CHANGE UP (ref 0–0.2)
BASOPHILS NFR BLD AUTO: 0.9 % — SIGNIFICANT CHANGE UP (ref 0–2)
EOSINOPHIL # BLD AUTO: 0.2 K/UL — SIGNIFICANT CHANGE UP (ref 0–0.5)
EOSINOPHIL NFR BLD AUTO: 2.4 % — SIGNIFICANT CHANGE UP (ref 0–6)
HCT VFR BLD CALC: 34.9 % — SIGNIFICANT CHANGE UP (ref 34.5–45)
HGB BLD-MCNC: 12.1 G/DL — SIGNIFICANT CHANGE UP (ref 11.5–15.5)
LYMPHOCYTES # BLD AUTO: 2.4 K/UL — SIGNIFICANT CHANGE UP (ref 1–3.3)
LYMPHOCYTES # BLD AUTO: 36.1 % — SIGNIFICANT CHANGE UP (ref 13–44)
MCHC RBC-ENTMCNC: 32.9 PG — SIGNIFICANT CHANGE UP (ref 27–34)
MCHC RBC-ENTMCNC: 34.6 G/DL — SIGNIFICANT CHANGE UP (ref 32–36)
MCV RBC AUTO: 95 FL — SIGNIFICANT CHANGE UP (ref 80–100)
MONOCYTES # BLD AUTO: 0.5 K/UL — SIGNIFICANT CHANGE UP (ref 0–0.9)
MONOCYTES NFR BLD AUTO: 6.8 % — SIGNIFICANT CHANGE UP (ref 2–14)
NEUTROPHILS # BLD AUTO: 3.6 K/UL — SIGNIFICANT CHANGE UP (ref 1.8–7.4)
NEUTROPHILS NFR BLD AUTO: 53.8 % — SIGNIFICANT CHANGE UP (ref 43–77)
PLATELET # BLD AUTO: 263 K/UL — SIGNIFICANT CHANGE UP (ref 150–400)
RBC # BLD: 3.67 M/UL — LOW (ref 3.8–5.2)
RBC # FLD: 11.4 % — SIGNIFICANT CHANGE UP (ref 10.3–14.5)
WBC # BLD: 6.8 K/UL — SIGNIFICANT CHANGE UP (ref 3.8–10.5)
WBC # FLD AUTO: 6.8 K/UL — SIGNIFICANT CHANGE UP (ref 3.8–10.5)

## 2018-03-06 PROCEDURE — 99214 OFFICE O/P EST MOD 30 MIN: CPT

## 2018-03-18 ENCOUNTER — FORM ENCOUNTER (OUTPATIENT)
Age: 68
End: 2018-03-18

## 2018-03-19 ENCOUNTER — OUTPATIENT (OUTPATIENT)
Dept: OUTPATIENT SERVICES | Facility: HOSPITAL | Age: 68
LOS: 1 days | End: 2018-03-19
Payer: MEDICARE

## 2018-03-19 ENCOUNTER — APPOINTMENT (OUTPATIENT)
Dept: CT IMAGING | Facility: IMAGING CENTER | Age: 68
End: 2018-03-19
Payer: MEDICARE

## 2018-03-19 DIAGNOSIS — C34.90 MALIGNANT NEOPLASM OF UNSPECIFIED PART OF UNSPECIFIED BRONCHUS OR LUNG: ICD-10-CM

## 2018-03-19 DIAGNOSIS — Z98.89 OTHER SPECIFIED POSTPROCEDURAL STATES: Chronic | ICD-10-CM

## 2018-03-19 DIAGNOSIS — M23.91 UNSPECIFIED INTERNAL DERANGEMENT OF RIGHT KNEE: Chronic | ICD-10-CM

## 2018-03-19 DIAGNOSIS — Z98.49 CATARACT EXTRACTION STATUS, UNSPECIFIED EYE: Chronic | ICD-10-CM

## 2018-03-19 PROCEDURE — 71260 CT THORAX DX C+: CPT

## 2018-03-19 PROCEDURE — 71260 CT THORAX DX C+: CPT | Mod: 26

## 2018-03-19 PROCEDURE — 71250 CT THORAX DX C-: CPT

## 2018-03-19 PROCEDURE — 74160 CT ABDOMEN W/CONTRAST: CPT

## 2018-03-19 PROCEDURE — 74160 CT ABDOMEN W/CONTRAST: CPT | Mod: 26

## 2018-03-26 ENCOUNTER — APPOINTMENT (OUTPATIENT)
Dept: INTERNAL MEDICINE | Facility: CLINIC | Age: 68
End: 2018-03-26
Payer: MEDICARE

## 2018-03-26 VITALS
SYSTOLIC BLOOD PRESSURE: 120 MMHG | HEIGHT: 61 IN | BODY MASS INDEX: 30.96 KG/M2 | WEIGHT: 164 LBS | DIASTOLIC BLOOD PRESSURE: 60 MMHG | HEART RATE: 61 BPM

## 2018-03-26 DIAGNOSIS — J31.0 CHRONIC RHINITIS: ICD-10-CM

## 2018-03-26 PROCEDURE — 99215 OFFICE O/P EST HI 40 MIN: CPT

## 2018-03-30 ENCOUNTER — OUTPATIENT (OUTPATIENT)
Dept: OUTPATIENT SERVICES | Facility: HOSPITAL | Age: 68
LOS: 1 days | Discharge: HOME | End: 2018-03-30

## 2018-03-30 DIAGNOSIS — Z98.89 OTHER SPECIFIED POSTPROCEDURAL STATES: Chronic | ICD-10-CM

## 2018-03-30 DIAGNOSIS — M23.91 UNSPECIFIED INTERNAL DERANGEMENT OF RIGHT KNEE: Chronic | ICD-10-CM

## 2018-03-30 DIAGNOSIS — C34.90 MALIGNANT NEOPLASM OF UNSPECIFIED PART OF UNSPECIFIED BRONCHUS OR LUNG: ICD-10-CM

## 2018-03-30 DIAGNOSIS — Z98.49 CATARACT EXTRACTION STATUS, UNSPECIFIED EYE: Chronic | ICD-10-CM

## 2018-04-03 ENCOUNTER — APPOINTMENT (OUTPATIENT)
Dept: HEMATOLOGY ONCOLOGY | Facility: CLINIC | Age: 68
End: 2018-04-03
Payer: MEDICARE

## 2018-04-03 VITALS
DIASTOLIC BLOOD PRESSURE: 82 MMHG | WEIGHT: 160.93 LBS | OXYGEN SATURATION: 98 % | BODY MASS INDEX: 30.41 KG/M2 | HEART RATE: 69 BPM | SYSTOLIC BLOOD PRESSURE: 155 MMHG | TEMPERATURE: 97.7 F | RESPIRATION RATE: 16 BRPM

## 2018-04-03 PROCEDURE — 99215 OFFICE O/P EST HI 40 MIN: CPT

## 2018-04-06 ENCOUNTER — RX RENEWAL (OUTPATIENT)
Age: 68
End: 2018-04-06

## 2018-04-27 ENCOUNTER — APPOINTMENT (OUTPATIENT)
Dept: DERMATOLOGY | Facility: CLINIC | Age: 68
End: 2018-04-27
Payer: MEDICARE

## 2018-04-27 VITALS
DIASTOLIC BLOOD PRESSURE: 58 MMHG | WEIGHT: 164 LBS | SYSTOLIC BLOOD PRESSURE: 110 MMHG | BODY MASS INDEX: 30.96 KG/M2 | HEIGHT: 61 IN

## 2018-04-27 DIAGNOSIS — Z86.39 PERSONAL HISTORY OF OTHER ENDOCRINE, NUTRITIONAL AND METABOLIC DISEASE: ICD-10-CM

## 2018-04-27 DIAGNOSIS — L82.1 OTHER SEBORRHEIC KERATOSIS: ICD-10-CM

## 2018-04-27 DIAGNOSIS — Z85.118 PERSONAL HISTORY OF OTHER MALIGNANT NEOPLASM OF BRONCHUS AND LUNG: ICD-10-CM

## 2018-04-27 DIAGNOSIS — Z78.9 OTHER SPECIFIED HEALTH STATUS: ICD-10-CM

## 2018-04-27 PROCEDURE — 99203 OFFICE O/P NEW LOW 30 MIN: CPT

## 2018-05-03 ENCOUNTER — OUTPATIENT (OUTPATIENT)
Dept: OUTPATIENT SERVICES | Facility: HOSPITAL | Age: 68
LOS: 1 days | Discharge: HOME | End: 2018-05-03

## 2018-05-03 DIAGNOSIS — Z98.89 OTHER SPECIFIED POSTPROCEDURAL STATES: Chronic | ICD-10-CM

## 2018-05-03 DIAGNOSIS — C34.90 MALIGNANT NEOPLASM OF UNSPECIFIED PART OF UNSPECIFIED BRONCHUS OR LUNG: ICD-10-CM

## 2018-05-03 DIAGNOSIS — Z98.49 CATARACT EXTRACTION STATUS, UNSPECIFIED EYE: Chronic | ICD-10-CM

## 2018-05-03 DIAGNOSIS — M23.91 UNSPECIFIED INTERNAL DERANGEMENT OF RIGHT KNEE: Chronic | ICD-10-CM

## 2018-05-04 ENCOUNTER — APPOINTMENT (OUTPATIENT)
Dept: HEMATOLOGY ONCOLOGY | Facility: CLINIC | Age: 68
End: 2018-05-04
Payer: MEDICARE

## 2018-05-04 VITALS
TEMPERATURE: 98 F | DIASTOLIC BLOOD PRESSURE: 72 MMHG | WEIGHT: 162.04 LBS | BODY MASS INDEX: 30.62 KG/M2 | HEART RATE: 76 BPM | OXYGEN SATURATION: 98 % | SYSTOLIC BLOOD PRESSURE: 139 MMHG | RESPIRATION RATE: 16 BRPM

## 2018-05-04 PROCEDURE — 99214 OFFICE O/P EST MOD 30 MIN: CPT

## 2018-05-21 ENCOUNTER — RX RENEWAL (OUTPATIENT)
Age: 68
End: 2018-05-21

## 2018-06-03 ENCOUNTER — FORM ENCOUNTER (OUTPATIENT)
Age: 68
End: 2018-06-03

## 2018-06-04 ENCOUNTER — OUTPATIENT (OUTPATIENT)
Dept: OUTPATIENT SERVICES | Facility: HOSPITAL | Age: 68
LOS: 1 days | End: 2018-06-04
Payer: MEDICARE

## 2018-06-04 ENCOUNTER — APPOINTMENT (OUTPATIENT)
Dept: CT IMAGING | Facility: IMAGING CENTER | Age: 68
End: 2018-06-04
Payer: MEDICARE

## 2018-06-04 ENCOUNTER — OUTPATIENT (OUTPATIENT)
Dept: OUTPATIENT SERVICES | Facility: HOSPITAL | Age: 68
LOS: 1 days | Discharge: HOME | End: 2018-06-04

## 2018-06-04 DIAGNOSIS — C34.90 MALIGNANT NEOPLASM OF UNSPECIFIED PART OF UNSPECIFIED BRONCHUS OR LUNG: ICD-10-CM

## 2018-06-04 DIAGNOSIS — Z98.89 OTHER SPECIFIED POSTPROCEDURAL STATES: Chronic | ICD-10-CM

## 2018-06-04 DIAGNOSIS — M23.91 UNSPECIFIED INTERNAL DERANGEMENT OF RIGHT KNEE: Chronic | ICD-10-CM

## 2018-06-04 DIAGNOSIS — Z98.49 CATARACT EXTRACTION STATUS, UNSPECIFIED EYE: Chronic | ICD-10-CM

## 2018-06-04 PROCEDURE — 71260 CT THORAX DX C+: CPT

## 2018-06-04 PROCEDURE — 71260 CT THORAX DX C+: CPT | Mod: 26

## 2018-06-04 PROCEDURE — 74160 CT ABDOMEN W/CONTRAST: CPT

## 2018-06-04 PROCEDURE — 82565 ASSAY OF CREATININE: CPT

## 2018-06-04 PROCEDURE — 74160 CT ABDOMEN W/CONTRAST: CPT | Mod: 26

## 2018-06-07 ENCOUNTER — RESULT REVIEW (OUTPATIENT)
Age: 68
End: 2018-06-07

## 2018-06-07 ENCOUNTER — APPOINTMENT (OUTPATIENT)
Dept: HEMATOLOGY ONCOLOGY | Facility: CLINIC | Age: 68
End: 2018-06-07
Payer: MEDICARE

## 2018-06-07 VITALS
WEIGHT: 160.93 LBS | SYSTOLIC BLOOD PRESSURE: 112 MMHG | DIASTOLIC BLOOD PRESSURE: 73 MMHG | OXYGEN SATURATION: 97 % | BODY MASS INDEX: 30.41 KG/M2 | HEART RATE: 67 BPM | TEMPERATURE: 97.5 F | RESPIRATION RATE: 16 BRPM

## 2018-06-07 LAB
BASOPHILS # BLD AUTO: 0 K/UL — SIGNIFICANT CHANGE UP (ref 0–0.2)
BASOPHILS NFR BLD AUTO: 0.5 % — SIGNIFICANT CHANGE UP (ref 0–2)
EOSINOPHIL # BLD AUTO: 0.2 K/UL — SIGNIFICANT CHANGE UP (ref 0–0.5)
EOSINOPHIL NFR BLD AUTO: 2.5 % — SIGNIFICANT CHANGE UP (ref 0–6)
HCT VFR BLD CALC: 36.3 % — SIGNIFICANT CHANGE UP (ref 34.5–45)
HGB BLD-MCNC: 12.5 G/DL — SIGNIFICANT CHANGE UP (ref 11.5–15.5)
LYMPHOCYTES # BLD AUTO: 2.2 K/UL — SIGNIFICANT CHANGE UP (ref 1–3.3)
LYMPHOCYTES # BLD AUTO: 29.8 % — SIGNIFICANT CHANGE UP (ref 13–44)
MCHC RBC-ENTMCNC: 33.1 PG — SIGNIFICANT CHANGE UP (ref 27–34)
MCHC RBC-ENTMCNC: 34.5 G/DL — SIGNIFICANT CHANGE UP (ref 32–36)
MCV RBC AUTO: 95.8 FL — SIGNIFICANT CHANGE UP (ref 80–100)
MONOCYTES # BLD AUTO: 0.4 K/UL — SIGNIFICANT CHANGE UP (ref 0–0.9)
MONOCYTES NFR BLD AUTO: 6 % — SIGNIFICANT CHANGE UP (ref 2–14)
NEUTROPHILS # BLD AUTO: 4.5 K/UL — SIGNIFICANT CHANGE UP (ref 1.8–7.4)
NEUTROPHILS NFR BLD AUTO: 61.2 % — SIGNIFICANT CHANGE UP (ref 43–77)
PLATELET # BLD AUTO: 256 K/UL — SIGNIFICANT CHANGE UP (ref 150–400)
RBC # BLD: 3.79 M/UL — LOW (ref 3.8–5.2)
RBC # FLD: 10.7 % — SIGNIFICANT CHANGE UP (ref 10.3–14.5)
WBC # BLD: 7.4 K/UL — SIGNIFICANT CHANGE UP (ref 3.8–10.5)
WBC # FLD AUTO: 7.4 K/UL — SIGNIFICANT CHANGE UP (ref 3.8–10.5)

## 2018-06-07 PROCEDURE — 99214 OFFICE O/P EST MOD 30 MIN: CPT

## 2018-06-14 ENCOUNTER — APPOINTMENT (OUTPATIENT)
Dept: HEMATOLOGY ONCOLOGY | Facility: CLINIC | Age: 68
End: 2018-06-14
Payer: MEDICARE

## 2018-06-14 VITALS
HEART RATE: 62 BPM | TEMPERATURE: 97.8 F | OXYGEN SATURATION: 99 % | WEIGHT: 160.92 LBS | RESPIRATION RATE: 16 BRPM | DIASTOLIC BLOOD PRESSURE: 78 MMHG | SYSTOLIC BLOOD PRESSURE: 135 MMHG | BODY MASS INDEX: 30.41 KG/M2

## 2018-06-14 LAB
CEA SERPL-MCNC: 1.9 NG/ML
TSH SERPL-ACNC: 1.75 UIU/ML

## 2018-06-14 PROCEDURE — 99214 OFFICE O/P EST MOD 30 MIN: CPT

## 2018-06-21 ENCOUNTER — RESULT REVIEW (OUTPATIENT)
Age: 68
End: 2018-06-21

## 2018-06-21 LAB
ALBUMIN SERPL ELPH-MCNC: 4.3 G/DL
ALP BLD-CCNC: 63 U/L
ALT SERPL-CCNC: 15 U/L
ANION GAP SERPL CALC-SCNC: 13 MMOL/L
AST SERPL-CCNC: 16 U/L
BILIRUB SERPL-MCNC: 0.2 MG/DL
BUN SERPL-MCNC: 25 MG/DL
CALCIUM SERPL-MCNC: 9.2 MG/DL
CHLORIDE SERPL-SCNC: 104 MMOL/L
CO2 SERPL-SCNC: 24 MMOL/L
CREAT SERPL-MCNC: 1.03 MG/DL
GLUCOSE SERPL-MCNC: 93 MG/DL
POTASSIUM SERPL-SCNC: 4 MMOL/L
PROT SERPL-MCNC: 7.1 G/DL
SODIUM SERPL-SCNC: 141 MMOL/L

## 2018-06-28 ENCOUNTER — APPOINTMENT (OUTPATIENT)
Dept: HEMATOLOGY ONCOLOGY | Facility: CLINIC | Age: 68
End: 2018-06-28
Payer: MEDICARE

## 2018-06-28 VITALS
OXYGEN SATURATION: 99 % | DIASTOLIC BLOOD PRESSURE: 67 MMHG | TEMPERATURE: 98.1 F | HEART RATE: 63 BPM | SYSTOLIC BLOOD PRESSURE: 134 MMHG | WEIGHT: 160.48 LBS | BODY MASS INDEX: 30.32 KG/M2 | RESPIRATION RATE: 16 BRPM

## 2018-06-28 PROCEDURE — 99214 OFFICE O/P EST MOD 30 MIN: CPT

## 2018-07-25 ENCOUNTER — OUTPATIENT (OUTPATIENT)
Dept: OUTPATIENT SERVICES | Facility: HOSPITAL | Age: 68
LOS: 1 days | Discharge: HOME | End: 2018-07-25

## 2018-07-25 DIAGNOSIS — Z98.89 OTHER SPECIFIED POSTPROCEDURAL STATES: Chronic | ICD-10-CM

## 2018-07-25 DIAGNOSIS — C34.90 MALIGNANT NEOPLASM OF UNSPECIFIED PART OF UNSPECIFIED BRONCHUS OR LUNG: ICD-10-CM

## 2018-07-25 DIAGNOSIS — M23.91 UNSPECIFIED INTERNAL DERANGEMENT OF RIGHT KNEE: Chronic | ICD-10-CM

## 2018-07-25 DIAGNOSIS — Z98.49 CATARACT EXTRACTION STATUS, UNSPECIFIED EYE: Chronic | ICD-10-CM

## 2018-07-27 ENCOUNTER — APPOINTMENT (OUTPATIENT)
Dept: DERMATOLOGY | Facility: CLINIC | Age: 68
End: 2018-07-27
Payer: MEDICARE

## 2018-07-27 VITALS — DIASTOLIC BLOOD PRESSURE: 60 MMHG | SYSTOLIC BLOOD PRESSURE: 100 MMHG

## 2018-07-27 PROCEDURE — 99213 OFFICE O/P EST LOW 20 MIN: CPT | Mod: GC

## 2018-08-01 ENCOUNTER — FORM ENCOUNTER (OUTPATIENT)
Age: 68
End: 2018-08-01

## 2018-08-02 ENCOUNTER — RESULT REVIEW (OUTPATIENT)
Age: 68
End: 2018-08-02

## 2018-08-02 ENCOUNTER — OUTPATIENT (OUTPATIENT)
Dept: OUTPATIENT SERVICES | Facility: HOSPITAL | Age: 68
LOS: 1 days | End: 2018-08-02
Payer: MEDICARE

## 2018-08-02 ENCOUNTER — APPOINTMENT (OUTPATIENT)
Dept: HEMATOLOGY ONCOLOGY | Facility: CLINIC | Age: 68
End: 2018-08-02
Payer: MEDICARE

## 2018-08-02 ENCOUNTER — APPOINTMENT (OUTPATIENT)
Dept: ULTRASOUND IMAGING | Facility: IMAGING CENTER | Age: 68
End: 2018-08-02
Payer: MEDICARE

## 2018-08-02 VITALS
OXYGEN SATURATION: 99 % | HEART RATE: 70 BPM | DIASTOLIC BLOOD PRESSURE: 79 MMHG | BODY MASS INDEX: 30.62 KG/M2 | TEMPERATURE: 98 F | RESPIRATION RATE: 16 BRPM | WEIGHT: 162.04 LBS | SYSTOLIC BLOOD PRESSURE: 149 MMHG

## 2018-08-02 DIAGNOSIS — Z98.89 OTHER SPECIFIED POSTPROCEDURAL STATES: Chronic | ICD-10-CM

## 2018-08-02 DIAGNOSIS — M23.91 UNSPECIFIED INTERNAL DERANGEMENT OF RIGHT KNEE: Chronic | ICD-10-CM

## 2018-08-02 DIAGNOSIS — C34.90 MALIGNANT NEOPLASM OF UNSPECIFIED PART OF UNSPECIFIED BRONCHUS OR LUNG: ICD-10-CM

## 2018-08-02 DIAGNOSIS — Z98.49 CATARACT EXTRACTION STATUS, UNSPECIFIED EYE: Chronic | ICD-10-CM

## 2018-08-02 LAB
BASOPHILS # BLD AUTO: 0.1 K/UL — SIGNIFICANT CHANGE UP (ref 0–0.2)
BASOPHILS NFR BLD AUTO: 0.7 % — SIGNIFICANT CHANGE UP (ref 0–2)
EOSINOPHIL # BLD AUTO: 0.3 K/UL — SIGNIFICANT CHANGE UP (ref 0–0.5)
EOSINOPHIL NFR BLD AUTO: 3.2 % — SIGNIFICANT CHANGE UP (ref 0–6)
HCT VFR BLD CALC: 38.6 % — SIGNIFICANT CHANGE UP (ref 34.5–45)
HGB BLD-MCNC: 13.1 G/DL — SIGNIFICANT CHANGE UP (ref 11.5–15.5)
LYMPHOCYTES # BLD AUTO: 2.6 K/UL — SIGNIFICANT CHANGE UP (ref 1–3.3)
LYMPHOCYTES # BLD AUTO: 29.7 % — SIGNIFICANT CHANGE UP (ref 13–44)
MCHC RBC-ENTMCNC: 31.9 PG — SIGNIFICANT CHANGE UP (ref 27–34)
MCHC RBC-ENTMCNC: 34 G/DL — SIGNIFICANT CHANGE UP (ref 32–36)
MCV RBC AUTO: 93.9 FL — SIGNIFICANT CHANGE UP (ref 80–100)
MONOCYTES # BLD AUTO: 0.7 K/UL — SIGNIFICANT CHANGE UP (ref 0–0.9)
MONOCYTES NFR BLD AUTO: 7.8 % — SIGNIFICANT CHANGE UP (ref 2–14)
NEUTROPHILS # BLD AUTO: 5.2 K/UL — SIGNIFICANT CHANGE UP (ref 1.8–7.4)
NEUTROPHILS NFR BLD AUTO: 58.6 % — SIGNIFICANT CHANGE UP (ref 43–77)
PLATELET # BLD AUTO: 243 K/UL — SIGNIFICANT CHANGE UP (ref 150–400)
RBC # BLD: 4.11 M/UL — SIGNIFICANT CHANGE UP (ref 3.8–5.2)
RBC # FLD: 11.3 % — SIGNIFICANT CHANGE UP (ref 10.3–14.5)
WBC # BLD: 8.9 K/UL — SIGNIFICANT CHANGE UP (ref 3.8–10.5)
WBC # FLD AUTO: 8.9 K/UL — SIGNIFICANT CHANGE UP (ref 3.8–10.5)

## 2018-08-02 PROCEDURE — 93970 EXTREMITY STUDY: CPT

## 2018-08-02 PROCEDURE — 99215 OFFICE O/P EST HI 40 MIN: CPT

## 2018-08-02 PROCEDURE — 93970 EXTREMITY STUDY: CPT | Mod: 26

## 2018-08-08 LAB
ALBUMIN SERPL ELPH-MCNC: 4.1 G/DL
ALP BLD-CCNC: 95 U/L
ALT SERPL-CCNC: 23 U/L
ANION GAP SERPL CALC-SCNC: 15 MMOL/L
AST SERPL-CCNC: 26 U/L
BILIRUB SERPL-MCNC: 0.4 MG/DL
BUN SERPL-MCNC: 34 MG/DL
CALCIUM SERPL-MCNC: 9.7 MG/DL
CEA SERPL-MCNC: 2.3 NG/ML
CHLORIDE SERPL-SCNC: 105 MMOL/L
CO2 SERPL-SCNC: 22 MMOL/L
CREAT SERPL-MCNC: 1 MG/DL
GLUCOSE SERPL-MCNC: 91 MG/DL
POTASSIUM SERPL-SCNC: 3.9 MMOL/L
PROT SERPL-MCNC: 6.9 G/DL
SODIUM SERPL-SCNC: 142 MMOL/L
TSH SERPL-ACNC: 2.19 UIU/ML

## 2018-08-20 ENCOUNTER — OUTPATIENT (OUTPATIENT)
Dept: OUTPATIENT SERVICES | Facility: HOSPITAL | Age: 68
LOS: 1 days | End: 2018-08-20
Payer: MEDICARE

## 2018-08-20 ENCOUNTER — APPOINTMENT (OUTPATIENT)
Dept: CV DIAGNOSITCS | Facility: HOSPITAL | Age: 68
End: 2018-08-20

## 2018-08-20 DIAGNOSIS — C34.90 MALIGNANT NEOPLASM OF UNSPECIFIED PART OF UNSPECIFIED BRONCHUS OR LUNG: ICD-10-CM

## 2018-08-20 DIAGNOSIS — Z98.89 OTHER SPECIFIED POSTPROCEDURAL STATES: Chronic | ICD-10-CM

## 2018-08-20 DIAGNOSIS — R22.42 LOCALIZED SWELLING, MASS AND LUMP, LEFT LOWER LIMB: ICD-10-CM

## 2018-08-20 DIAGNOSIS — Z98.49 CATARACT EXTRACTION STATUS, UNSPECIFIED EYE: Chronic | ICD-10-CM

## 2018-08-20 DIAGNOSIS — I62.9 NONTRAUMATIC INTRACRANIAL HEMORRHAGE, UNSPECIFIED: ICD-10-CM

## 2018-08-20 DIAGNOSIS — M23.91 UNSPECIFIED INTERNAL DERANGEMENT OF RIGHT KNEE: Chronic | ICD-10-CM

## 2018-08-20 PROCEDURE — 93970 EXTREMITY STUDY: CPT | Mod: 26

## 2018-08-20 PROCEDURE — 93306 TTE W/DOPPLER COMPLETE: CPT | Mod: 26

## 2018-08-24 ENCOUNTER — OUTPATIENT (OUTPATIENT)
Dept: OUTPATIENT SERVICES | Facility: HOSPITAL | Age: 68
LOS: 1 days | Discharge: HOME | End: 2018-08-24

## 2018-08-24 DIAGNOSIS — Z98.49 CATARACT EXTRACTION STATUS, UNSPECIFIED EYE: Chronic | ICD-10-CM

## 2018-08-24 DIAGNOSIS — Z98.89 OTHER SPECIFIED POSTPROCEDURAL STATES: Chronic | ICD-10-CM

## 2018-08-24 DIAGNOSIS — M23.91 UNSPECIFIED INTERNAL DERANGEMENT OF RIGHT KNEE: Chronic | ICD-10-CM

## 2018-08-24 DIAGNOSIS — C34.90 MALIGNANT NEOPLASM OF UNSPECIFIED PART OF UNSPECIFIED BRONCHUS OR LUNG: ICD-10-CM

## 2018-09-04 ENCOUNTER — APPOINTMENT (OUTPATIENT)
Dept: HEMATOLOGY ONCOLOGY | Facility: CLINIC | Age: 68
End: 2018-09-04
Payer: MEDICARE

## 2018-09-04 VITALS
OXYGEN SATURATION: 99 % | BODY MASS INDEX: 31.78 KG/M2 | SYSTOLIC BLOOD PRESSURE: 137 MMHG | RESPIRATION RATE: 16 BRPM | WEIGHT: 168.21 LBS | DIASTOLIC BLOOD PRESSURE: 80 MMHG | HEART RATE: 70 BPM | TEMPERATURE: 97.8 F

## 2018-09-04 PROCEDURE — 99215 OFFICE O/P EST HI 40 MIN: CPT

## 2018-09-04 RX ORDER — AMOXICILLIN 500 MG/1
500 TABLET, FILM COATED ORAL EVERY 8 HOURS
Qty: 21 | Refills: 0 | Status: DISCONTINUED | COMMUNITY
Start: 2018-03-26 | End: 2018-09-04

## 2018-09-04 RX ORDER — CEPHALEXIN 500 MG/1
500 CAPSULE ORAL 3 TIMES DAILY
Qty: 30 | Refills: 0 | Status: DISCONTINUED | COMMUNITY
Start: 2018-05-04 | End: 2018-09-04

## 2018-09-05 ENCOUNTER — APPOINTMENT (OUTPATIENT)
Dept: INTERNAL MEDICINE | Facility: CLINIC | Age: 68
End: 2018-09-05
Payer: MEDICARE

## 2018-09-05 VITALS
HEIGHT: 61 IN | HEART RATE: 72 BPM | SYSTOLIC BLOOD PRESSURE: 130 MMHG | WEIGHT: 168 LBS | BODY MASS INDEX: 31.72 KG/M2 | DIASTOLIC BLOOD PRESSURE: 70 MMHG

## 2018-09-05 DIAGNOSIS — R07.81 PLEURODYNIA: ICD-10-CM

## 2018-09-05 PROCEDURE — 99215 OFFICE O/P EST HI 40 MIN: CPT

## 2018-09-05 NOTE — PHYSICAL EXAM
[No Acute Distress] : no acute distress [Normal Sclera/Conjunctiva] : normal sclera/conjunctiva [No Respiratory Distress] : no respiratory distress  [Normal Appearance] : normal in appearance [No Masses] : no palpable masses [Non Tender] : non-tender [Normal Bowel Sounds] : normal bowel sounds [Normal Affect] : the affect was normal [Alert and Oriented x3] : oriented to person, place, and time [de-identified] : 1= pedal edema [de-identified] : sl tender with deep palp bilateral chest wall lateral to the breasts

## 2018-09-05 NOTE — HISTORY OF PRESENT ILLNESS
[FreeTextEntry1] : here for f/u\par  [de-identified] : pt has been under treatment with gilotrof for NSCLC since dx dec 2017. last seen by oncology yesterday..management with f/u imaging and labwork UTD.\par pt feels well. she has no acute sxs except chronic hemorrhoids that are freq uncomfortable jesenia after loose BM ( related to meds) and occas streaks of red blood. \par appetite fine. energy fine\par going to [place] next week. '\par edema of feet..stable per pt. no pain, uses diuretics prn. denies claudication

## 2018-09-05 NOTE — ASSESSMENT
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 otherwise pt asxs\par ?muscular skeletal\par pt is s/p recent chest imaging june 2018..advise pt to f/u oncology in OCT upon return from her trip..\par 3.mammo: due nov 2018\par 4.Hemorrhoids: discussed sitz bath\par anusol HC cr prn increase pain  short duration\par otherwise OTC cr prn\par 5.pedal edema: s/p lower ext doppler \par ?dependent edema. s/p echo \par leg elevation/ diuretic prn\par cautioned re long plane flight\par 6.hyperlipidemia: on statin asxs

## 2018-09-05 NOTE — REVIEW OF SYSTEMS
[Negative] : Musculoskeletal [Fever] : no fever [Chills] : no chills [Fatigue] : no fatigue [Abdominal Pain] : no abdominal pain [Heartburn] : no heartburn [Melena] : no melena [Skin Rash] : no skin rash [Dizziness] : no dizziness [Fainting] : no fainting

## 2018-10-08 ENCOUNTER — OUTPATIENT (OUTPATIENT)
Dept: OUTPATIENT SERVICES | Facility: HOSPITAL | Age: 68
LOS: 1 days | Discharge: HOME | End: 2018-10-08

## 2018-10-08 DIAGNOSIS — M23.91 UNSPECIFIED INTERNAL DERANGEMENT OF RIGHT KNEE: Chronic | ICD-10-CM

## 2018-10-08 DIAGNOSIS — C34.90 MALIGNANT NEOPLASM OF UNSPECIFIED PART OF UNSPECIFIED BRONCHUS OR LUNG: ICD-10-CM

## 2018-10-08 DIAGNOSIS — Z98.89 OTHER SPECIFIED POSTPROCEDURAL STATES: Chronic | ICD-10-CM

## 2018-10-08 DIAGNOSIS — Z98.49 CATARACT EXTRACTION STATUS, UNSPECIFIED EYE: Chronic | ICD-10-CM

## 2018-10-16 ENCOUNTER — APPOINTMENT (OUTPATIENT)
Dept: HEMATOLOGY ONCOLOGY | Facility: CLINIC | Age: 68
End: 2018-10-16
Payer: MEDICARE

## 2018-10-16 ENCOUNTER — RESULT REVIEW (OUTPATIENT)
Age: 68
End: 2018-10-16

## 2018-10-16 VITALS
RESPIRATION RATE: 16 BRPM | HEART RATE: 74 BPM | WEIGHT: 169.76 LBS | OXYGEN SATURATION: 98 % | DIASTOLIC BLOOD PRESSURE: 78 MMHG | SYSTOLIC BLOOD PRESSURE: 154 MMHG | TEMPERATURE: 98 F | BODY MASS INDEX: 32.07 KG/M2

## 2018-10-16 LAB
BASOPHILS # BLD AUTO: 0.1 K/UL — SIGNIFICANT CHANGE UP (ref 0–0.2)
BASOPHILS NFR BLD AUTO: 0.9 % — SIGNIFICANT CHANGE UP (ref 0–2)
EOSINOPHIL # BLD AUTO: 0.3 K/UL — SIGNIFICANT CHANGE UP (ref 0–0.5)
EOSINOPHIL NFR BLD AUTO: 4 % — SIGNIFICANT CHANGE UP (ref 0–6)
HCT VFR BLD CALC: 37.8 % — SIGNIFICANT CHANGE UP (ref 34.5–45)
HGB BLD-MCNC: 12.8 G/DL — SIGNIFICANT CHANGE UP (ref 11.5–15.5)
LYMPHOCYTES # BLD AUTO: 2.1 K/UL — SIGNIFICANT CHANGE UP (ref 1–3.3)
LYMPHOCYTES # BLD AUTO: 31.2 % — SIGNIFICANT CHANGE UP (ref 13–44)
MCHC RBC-ENTMCNC: 31.6 PG — SIGNIFICANT CHANGE UP (ref 27–34)
MCHC RBC-ENTMCNC: 33.7 G/DL — SIGNIFICANT CHANGE UP (ref 32–36)
MCV RBC AUTO: 93.8 FL — SIGNIFICANT CHANGE UP (ref 80–100)
MONOCYTES # BLD AUTO: 0.6 K/UL — SIGNIFICANT CHANGE UP (ref 0–0.9)
MONOCYTES NFR BLD AUTO: 8.6 % — SIGNIFICANT CHANGE UP (ref 2–14)
NEUTROPHILS # BLD AUTO: 3.7 K/UL — SIGNIFICANT CHANGE UP (ref 1.8–7.4)
NEUTROPHILS NFR BLD AUTO: 55.2 % — SIGNIFICANT CHANGE UP (ref 43–77)
PLATELET # BLD AUTO: 261 K/UL — SIGNIFICANT CHANGE UP (ref 150–400)
RBC # BLD: 4.03 M/UL — SIGNIFICANT CHANGE UP (ref 3.8–5.2)
RBC # FLD: 11.7 % — SIGNIFICANT CHANGE UP (ref 10.3–14.5)
WBC # BLD: 6.8 K/UL — SIGNIFICANT CHANGE UP (ref 3.8–10.5)
WBC # FLD AUTO: 6.8 K/UL — SIGNIFICANT CHANGE UP (ref 3.8–10.5)

## 2018-10-16 PROCEDURE — 99214 OFFICE O/P EST MOD 30 MIN: CPT

## 2018-10-17 ENCOUNTER — FORM ENCOUNTER (OUTPATIENT)
Age: 68
End: 2018-10-17

## 2018-10-18 ENCOUNTER — APPOINTMENT (OUTPATIENT)
Dept: CT IMAGING | Facility: IMAGING CENTER | Age: 68
End: 2018-10-18
Payer: MEDICARE

## 2018-10-18 ENCOUNTER — OUTPATIENT (OUTPATIENT)
Dept: OUTPATIENT SERVICES | Facility: HOSPITAL | Age: 68
LOS: 1 days | End: 2018-10-18
Payer: MEDICARE

## 2018-10-18 DIAGNOSIS — Z98.49 CATARACT EXTRACTION STATUS, UNSPECIFIED EYE: Chronic | ICD-10-CM

## 2018-10-18 DIAGNOSIS — C34.90 MALIGNANT NEOPLASM OF UNSPECIFIED PART OF UNSPECIFIED BRONCHUS OR LUNG: ICD-10-CM

## 2018-10-18 DIAGNOSIS — Z98.89 OTHER SPECIFIED POSTPROCEDURAL STATES: Chronic | ICD-10-CM

## 2018-10-18 DIAGNOSIS — M23.91 UNSPECIFIED INTERNAL DERANGEMENT OF RIGHT KNEE: Chronic | ICD-10-CM

## 2018-10-18 PROCEDURE — 74160 CT ABDOMEN W/CONTRAST: CPT | Mod: 26

## 2018-10-18 PROCEDURE — 71260 CT THORAX DX C+: CPT | Mod: 26

## 2018-10-18 PROCEDURE — 74160 CT ABDOMEN W/CONTRAST: CPT

## 2018-10-18 PROCEDURE — 71260 CT THORAX DX C+: CPT

## 2018-10-24 ENCOUNTER — APPOINTMENT (OUTPATIENT)
Dept: HEMATOLOGY ONCOLOGY | Facility: CLINIC | Age: 68
End: 2018-10-24
Payer: MEDICARE

## 2018-10-24 VITALS
RESPIRATION RATE: 16 BRPM | TEMPERATURE: 98 F | DIASTOLIC BLOOD PRESSURE: 78 MMHG | BODY MASS INDEX: 31.66 KG/M2 | SYSTOLIC BLOOD PRESSURE: 130 MMHG | HEART RATE: 79 BPM | WEIGHT: 167.55 LBS | OXYGEN SATURATION: 98 %

## 2018-10-24 PROCEDURE — 99214 OFFICE O/P EST MOD 30 MIN: CPT

## 2018-10-27 LAB
ALBUMIN SERPL ELPH-MCNC: 4 G/DL
ALP BLD-CCNC: 82 U/L
ALT SERPL-CCNC: 17 U/L
ANION GAP SERPL CALC-SCNC: 12 MMOL/L
AST SERPL-CCNC: 21 U/L
BILIRUB SERPL-MCNC: 0.2 MG/DL
BUN SERPL-MCNC: 34 MG/DL
CALCIUM SERPL-MCNC: 9.2 MG/DL
CEA SERPL-MCNC: 1.6 NG/ML
CHLORIDE SERPL-SCNC: 107 MMOL/L
CO2 SERPL-SCNC: 25 MMOL/L
CREAT SERPL-MCNC: 1.02 MG/DL
GLUCOSE SERPL-MCNC: 96 MG/DL
POTASSIUM SERPL-SCNC: 4.7 MMOL/L
PROT SERPL-MCNC: 6.7 G/DL
SODIUM SERPL-SCNC: 144 MMOL/L

## 2018-11-13 ENCOUNTER — APPOINTMENT (OUTPATIENT)
Dept: OPHTHALMOLOGY | Facility: CLINIC | Age: 68
End: 2018-11-13
Payer: MEDICARE

## 2018-11-13 DIAGNOSIS — H35.373 PUCKERING OF MACULA, BILATERAL: ICD-10-CM

## 2018-11-13 DIAGNOSIS — H10.13 ACUTE ATOPIC CONJUNCTIVITIS, BILATERAL: ICD-10-CM

## 2018-11-13 PROCEDURE — 92014 COMPRE OPH EXAM EST PT 1/>: CPT

## 2018-11-13 PROCEDURE — 92134 CPTRZ OPH DX IMG PST SGM RTA: CPT

## 2018-11-15 ENCOUNTER — OUTPATIENT (OUTPATIENT)
Dept: OUTPATIENT SERVICES | Facility: HOSPITAL | Age: 68
LOS: 1 days | Discharge: HOME | End: 2018-11-15

## 2018-11-15 DIAGNOSIS — C34.90 MALIGNANT NEOPLASM OF UNSPECIFIED PART OF UNSPECIFIED BRONCHUS OR LUNG: ICD-10-CM

## 2018-11-15 DIAGNOSIS — Z98.89 OTHER SPECIFIED POSTPROCEDURAL STATES: Chronic | ICD-10-CM

## 2018-11-15 DIAGNOSIS — M23.91 UNSPECIFIED INTERNAL DERANGEMENT OF RIGHT KNEE: Chronic | ICD-10-CM

## 2018-11-15 DIAGNOSIS — Z98.49 CATARACT EXTRACTION STATUS, UNSPECIFIED EYE: Chronic | ICD-10-CM

## 2018-11-27 ENCOUNTER — RESULT REVIEW (OUTPATIENT)
Age: 68
End: 2018-11-27

## 2018-11-27 ENCOUNTER — APPOINTMENT (OUTPATIENT)
Dept: HEMATOLOGY ONCOLOGY | Facility: CLINIC | Age: 68
End: 2018-11-27
Payer: MEDICARE

## 2018-11-27 VITALS
WEIGHT: 165.34 LBS | DIASTOLIC BLOOD PRESSURE: 78 MMHG | TEMPERATURE: 97.5 F | SYSTOLIC BLOOD PRESSURE: 153 MMHG | HEART RATE: 66 BPM | RESPIRATION RATE: 16 BRPM | BODY MASS INDEX: 31.24 KG/M2 | OXYGEN SATURATION: 97 %

## 2018-11-27 LAB
BASOPHILS # BLD AUTO: 0 K/UL — SIGNIFICANT CHANGE UP (ref 0–0.2)
BASOPHILS NFR BLD AUTO: 0.8 % — SIGNIFICANT CHANGE UP (ref 0–2)
EOSINOPHIL # BLD AUTO: 0.3 K/UL — SIGNIFICANT CHANGE UP (ref 0–0.5)
EOSINOPHIL NFR BLD AUTO: 5.5 % — SIGNIFICANT CHANGE UP (ref 0–6)
HCT VFR BLD CALC: 38.4 % — SIGNIFICANT CHANGE UP (ref 34.5–45)
HGB BLD-MCNC: 13 G/DL — SIGNIFICANT CHANGE UP (ref 11.5–15.5)
LYMPHOCYTES # BLD AUTO: 1.8 K/UL — SIGNIFICANT CHANGE UP (ref 1–3.3)
LYMPHOCYTES # BLD AUTO: 29.4 % — SIGNIFICANT CHANGE UP (ref 13–44)
MCHC RBC-ENTMCNC: 31 PG — SIGNIFICANT CHANGE UP (ref 27–34)
MCHC RBC-ENTMCNC: 33.7 G/DL — SIGNIFICANT CHANGE UP (ref 32–36)
MCV RBC AUTO: 91.9 FL — SIGNIFICANT CHANGE UP (ref 80–100)
MONOCYTES # BLD AUTO: 0.5 K/UL — SIGNIFICANT CHANGE UP (ref 0–0.9)
MONOCYTES NFR BLD AUTO: 9 % — SIGNIFICANT CHANGE UP (ref 2–14)
NEUTROPHILS # BLD AUTO: 3.3 K/UL — SIGNIFICANT CHANGE UP (ref 1.8–7.4)
NEUTROPHILS NFR BLD AUTO: 55.3 % — SIGNIFICANT CHANGE UP (ref 43–77)
PLATELET # BLD AUTO: 256 K/UL — SIGNIFICANT CHANGE UP (ref 150–400)
RBC # BLD: 4.18 M/UL — SIGNIFICANT CHANGE UP (ref 3.8–5.2)
RBC # FLD: 11.3 % — SIGNIFICANT CHANGE UP (ref 10.3–14.5)
WBC # BLD: 6 K/UL — SIGNIFICANT CHANGE UP (ref 3.8–10.5)
WBC # FLD AUTO: 6 K/UL — SIGNIFICANT CHANGE UP (ref 3.8–10.5)

## 2018-11-27 PROCEDURE — 99215 OFFICE O/P EST HI 40 MIN: CPT

## 2018-11-27 NOTE — PHYSICAL EXAM
[Fully active, able to carry on all pre-disease performance without restriction] : Status 0 - Fully active, able to carry on all pre-disease performance without restriction [Normal] : affect appropriate [de-identified] : congenital deafness- communicates with sign language. Facial rash resolved, hypertrichosis [de-identified] : facial rash resolved, L great toe with bandage following minor surgical procedure

## 2018-11-27 NOTE — ASSESSMENT
[FreeTextEntry1] : 68 yo w with recently diagnosed lung ca on afatinib 20mg daily now with significant improvement of previous skin toxcity\par \par Plan:\par -Will continue afatinib dose reduced 20mg daily\par -minocycline once  day\par -Repeat scans in October reviewed. Stable disease. Continue with current treatment plan\par -LE extremity-No DVT-Furosemide 20mg QOD for management of peripheral edema\par -OV in 2 months\par \par  [Palliative] : Goals of care discussed with patient: Palliative [Palliative Care Plan] : Patient was apprised of incurable nature of disease.  Hospice and Palliative care options discussed.

## 2018-11-27 NOTE — REVIEW OF SYSTEMS
[Fatigue] : no fatigue [Recent Change In Weight] : ~T recent weight change [Chest Pain] : no chest pain [Shortness Of Breath] : no shortness of breath [Cough] : no cough [SOB on Exertion] : no shortness of breath during exertion [Negative] : Allergic/Immunologic [FreeTextEntry2] : stable weight [de-identified] : as above

## 2018-11-27 NOTE — HISTORY OF PRESENT ILLNESS
[Disease: _____________________] : Disease: [unfilled] [T: ___] : T[unfilled] [N: ___] : N[unfilled] [M: ___] : M[unfilled] [AJCC Stage: ____] : AJCC Stage: [unfilled] [de-identified] : Ms. Mcintyre is a pleasant 68-year-old woman with recently diagnosed lung adeno ca, here for a follow up visit. \par Munira hx- Ms. 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 She was started on Gilotrif 30 mg in 01/2018 which she did not tolerate well due to cutaneous toxicity for which dose gilotrif at reduced dose of 20mg June 14th. She is tolerating this well without return of previous skin toxicity experienced at higher dose. Pt has no new complaints. She reports no rash, nail issues have resolved. She has one loose BM daily. Weight is stable.  [de-identified] : pleomophic,

## 2018-11-29 ENCOUNTER — FORM ENCOUNTER (OUTPATIENT)
Age: 68
End: 2018-11-29

## 2018-11-29 LAB
ALBUMIN SERPL ELPH-MCNC: 3.6 G/DL
ALP BLD-CCNC: 67 U/L
ALT SERPL-CCNC: 12 U/L
ANION GAP SERPL CALC-SCNC: 9 MMOL/L
AST SERPL-CCNC: 17 U/L
BILIRUB SERPL-MCNC: 0.3 MG/DL
BUN SERPL-MCNC: 25 MG/DL
CALCIUM SERPL-MCNC: 9.1 MG/DL
CEA SERPL-MCNC: 1.6 NG/ML
CHLORIDE SERPL-SCNC: 108 MMOL/L
CO2 SERPL-SCNC: 24 MMOL/L
CREAT SERPL-MCNC: 0.95 MG/DL
GLUCOSE SERPL-MCNC: 91 MG/DL
POTASSIUM SERPL-SCNC: 4 MMOL/L
PROT SERPL-MCNC: 6.5 G/DL
SODIUM SERPL-SCNC: 141 MMOL/L
TSH SERPL-ACNC: 2.15 UIU/ML

## 2018-11-30 ENCOUNTER — OUTPATIENT (OUTPATIENT)
Dept: OUTPATIENT SERVICES | Facility: HOSPITAL | Age: 68
LOS: 1 days | End: 2018-11-30
Payer: MEDICARE

## 2018-11-30 ENCOUNTER — APPOINTMENT (OUTPATIENT)
Dept: MAMMOGRAPHY | Facility: IMAGING CENTER | Age: 68
End: 2018-11-30
Payer: MEDICARE

## 2018-11-30 DIAGNOSIS — Z98.49 CATARACT EXTRACTION STATUS, UNSPECIFIED EYE: Chronic | ICD-10-CM

## 2018-11-30 DIAGNOSIS — Z00.8 ENCOUNTER FOR OTHER GENERAL EXAMINATION: ICD-10-CM

## 2018-11-30 DIAGNOSIS — Z98.89 OTHER SPECIFIED POSTPROCEDURAL STATES: Chronic | ICD-10-CM

## 2018-11-30 DIAGNOSIS — M23.91 UNSPECIFIED INTERNAL DERANGEMENT OF RIGHT KNEE: Chronic | ICD-10-CM

## 2018-11-30 PROCEDURE — 77067 SCR MAMMO BI INCL CAD: CPT | Mod: 26

## 2018-11-30 PROCEDURE — 77063 BREAST TOMOSYNTHESIS BI: CPT | Mod: 26

## 2018-11-30 PROCEDURE — 77067 SCR MAMMO BI INCL CAD: CPT

## 2018-11-30 PROCEDURE — 77063 BREAST TOMOSYNTHESIS BI: CPT

## 2019-01-23 ENCOUNTER — MEDICATION RENEWAL (OUTPATIENT)
Age: 69
End: 2019-01-23

## 2019-01-23 ENCOUNTER — TRANSCRIPTION ENCOUNTER (OUTPATIENT)
Age: 69
End: 2019-01-23

## 2019-01-24 ENCOUNTER — RX RENEWAL (OUTPATIENT)
Age: 69
End: 2019-01-24

## 2019-01-25 ENCOUNTER — OUTPATIENT (OUTPATIENT)
Dept: OUTPATIENT SERVICES | Facility: HOSPITAL | Age: 69
LOS: 1 days | Discharge: HOME | End: 2019-01-25

## 2019-01-25 DIAGNOSIS — Z98.89 OTHER SPECIFIED POSTPROCEDURAL STATES: Chronic | ICD-10-CM

## 2019-01-25 DIAGNOSIS — C34.90 MALIGNANT NEOPLASM OF UNSPECIFIED PART OF UNSPECIFIED BRONCHUS OR LUNG: ICD-10-CM

## 2019-01-25 DIAGNOSIS — M23.91 UNSPECIFIED INTERNAL DERANGEMENT OF RIGHT KNEE: Chronic | ICD-10-CM

## 2019-01-25 DIAGNOSIS — Z98.49 CATARACT EXTRACTION STATUS, UNSPECIFIED EYE: Chronic | ICD-10-CM

## 2019-01-27 ENCOUNTER — FORM ENCOUNTER (OUTPATIENT)
Age: 69
End: 2019-01-27

## 2019-01-28 ENCOUNTER — APPOINTMENT (OUTPATIENT)
Dept: CT IMAGING | Facility: IMAGING CENTER | Age: 69
End: 2019-01-28
Payer: MEDICARE

## 2019-01-28 ENCOUNTER — OUTPATIENT (OUTPATIENT)
Dept: OUTPATIENT SERVICES | Facility: HOSPITAL | Age: 69
LOS: 1 days | End: 2019-01-28
Payer: MEDICARE

## 2019-01-28 DIAGNOSIS — C34.90 MALIGNANT NEOPLASM OF UNSPECIFIED PART OF UNSPECIFIED BRONCHUS OR LUNG: ICD-10-CM

## 2019-01-28 DIAGNOSIS — M23.91 UNSPECIFIED INTERNAL DERANGEMENT OF RIGHT KNEE: Chronic | ICD-10-CM

## 2019-01-28 DIAGNOSIS — Z98.89 OTHER SPECIFIED POSTPROCEDURAL STATES: Chronic | ICD-10-CM

## 2019-01-28 DIAGNOSIS — Z98.49 CATARACT EXTRACTION STATUS, UNSPECIFIED EYE: Chronic | ICD-10-CM

## 2019-01-28 PROCEDURE — 71260 CT THORAX DX C+: CPT

## 2019-01-28 PROCEDURE — 71260 CT THORAX DX C+: CPT | Mod: 26

## 2019-01-28 PROCEDURE — 74177 CT ABD & PELVIS W/CONTRAST: CPT

## 2019-01-28 PROCEDURE — 74177 CT ABD & PELVIS W/CONTRAST: CPT | Mod: 26

## 2019-01-28 PROCEDURE — 82565 ASSAY OF CREATININE: CPT

## 2019-02-05 ENCOUNTER — APPOINTMENT (OUTPATIENT)
Dept: HEMATOLOGY ONCOLOGY | Facility: CLINIC | Age: 69
End: 2019-02-05
Payer: MEDICARE

## 2019-02-05 ENCOUNTER — RESULT REVIEW (OUTPATIENT)
Age: 69
End: 2019-02-05

## 2019-02-05 VITALS
RESPIRATION RATE: 16 BRPM | SYSTOLIC BLOOD PRESSURE: 148 MMHG | TEMPERATURE: 97.5 F | OXYGEN SATURATION: 98 % | HEART RATE: 69 BPM | WEIGHT: 168.65 LBS | DIASTOLIC BLOOD PRESSURE: 76 MMHG | BODY MASS INDEX: 31.87 KG/M2

## 2019-02-05 LAB
BASOPHILS # BLD AUTO: 0 K/UL — SIGNIFICANT CHANGE UP (ref 0–0.2)
BASOPHILS NFR BLD AUTO: 0.3 % — SIGNIFICANT CHANGE UP (ref 0–2)
EOSINOPHIL # BLD AUTO: 0.3 K/UL — SIGNIFICANT CHANGE UP (ref 0–0.5)
EOSINOPHIL NFR BLD AUTO: 4.7 % — SIGNIFICANT CHANGE UP (ref 0–6)
HCT VFR BLD CALC: 37 % — SIGNIFICANT CHANGE UP (ref 34.5–45)
HGB BLD-MCNC: 12.7 G/DL — SIGNIFICANT CHANGE UP (ref 11.5–15.5)
LYMPHOCYTES # BLD AUTO: 1.7 K/UL — SIGNIFICANT CHANGE UP (ref 1–3.3)
LYMPHOCYTES # BLD AUTO: 25.1 % — SIGNIFICANT CHANGE UP (ref 13–44)
MCHC RBC-ENTMCNC: 31.7 PG — SIGNIFICANT CHANGE UP (ref 27–34)
MCHC RBC-ENTMCNC: 34.2 G/DL — SIGNIFICANT CHANGE UP (ref 32–36)
MCV RBC AUTO: 92.7 FL — SIGNIFICANT CHANGE UP (ref 80–100)
MONOCYTES # BLD AUTO: 0.6 K/UL — SIGNIFICANT CHANGE UP (ref 0–0.9)
MONOCYTES NFR BLD AUTO: 8.5 % — SIGNIFICANT CHANGE UP (ref 2–14)
NEUTROPHILS # BLD AUTO: 4.2 K/UL — SIGNIFICANT CHANGE UP (ref 1.8–7.4)
NEUTROPHILS NFR BLD AUTO: 61.4 % — SIGNIFICANT CHANGE UP (ref 43–77)
PLATELET # BLD AUTO: 239 K/UL — SIGNIFICANT CHANGE UP (ref 150–400)
RBC # BLD: 3.99 M/UL — SIGNIFICANT CHANGE UP (ref 3.8–5.2)
RBC # FLD: 11.9 % — SIGNIFICANT CHANGE UP (ref 10.3–14.5)
WBC # BLD: 6.9 K/UL — SIGNIFICANT CHANGE UP (ref 3.8–10.5)
WBC # FLD AUTO: 6.9 K/UL — SIGNIFICANT CHANGE UP (ref 3.8–10.5)

## 2019-02-05 PROCEDURE — 99215 OFFICE O/P EST HI 40 MIN: CPT

## 2019-02-05 NOTE — REVIEW OF SYSTEMS
[Fatigue] : no fatigue [Recent Change In Weight] : ~T recent weight change [Chest Pain] : no chest pain [Shortness Of Breath] : no shortness of breath [Cough] : no cough [SOB on Exertion] : no shortness of breath during exertion [Negative] : Allergic/Immunologic [FreeTextEntry2] : stable weight [de-identified] : as above

## 2019-02-05 NOTE — REVIEW OF SYSTEMS
[Fatigue] : no fatigue [Recent Change In Weight] : ~T recent weight change [Chest Pain] : no chest pain [Shortness Of Breath] : no shortness of breath [Cough] : no cough [SOB on Exertion] : no shortness of breath during exertion [Negative] : Allergic/Immunologic [FreeTextEntry2] : stable weight [de-identified] : as above

## 2019-02-05 NOTE — PHYSICAL EXAM
[Fully active, able to carry on all pre-disease performance without restriction] : Status 0 - Fully active, able to carry on all pre-disease performance without restriction [Normal] : affect appropriate [de-identified] : congenital deafness- communicates with sign language. Facial rash resolved, hypertrichosis [de-identified] : 1+ edema [de-identified] : facial rash resolved, L 2nd toe with bandage following minor surgical procedure

## 2019-02-05 NOTE — ASSESSMENT
[FreeTextEntry1] : 66 yo w with recently diagnosed lung ca on afatinib 20mg daily now with significant improvement of previous skin toxcity\par \par Plan:\par -Will continue afatinib dose reduced 20mg daily\par -minocycline once  day\par -Repeat scans in January reviewed. Stable disease. Continue with current treatment plan. CT pelvis shows hypodensity in the uterus. Follow up with gyn\par -Skin care and nail care reiterated\par -imodium for diarrhea\par -LE extremity-No DVT- continue Furosemide 20mg QOD for management of peripheral edema\par -OV in 2 months\par \par  [Palliative] : Goals of care discussed with patient: Palliative [Palliative Care Plan] : Patient was apprised of incurable nature of disease.  Hospice and Palliative care options discussed.

## 2019-02-05 NOTE — HISTORY OF PRESENT ILLNESS
[Disease: _____________________] : Disease: [unfilled] [T: ___] : T[unfilled] [N: ___] : N[unfilled] [M: ___] : M[unfilled] [AJCC Stage: ____] : AJCC Stage: [unfilled] [de-identified] : Ms. Mcintyre is a pleasant 68-year-old woman with recently diagnosed lung adeno ca, here for a follow up visit. \par Munira hx- Ms. 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 She was started on Gilotrif 30 mg in 01/2018 which she did not tolerate well due to cutaneous toxicity for which dose gilotrif at reduced dose of 20mg June 14th. She is tolerating this well without return of previous skin toxicity experienced at higher dose. Pt has no new complaints. She reports no rash, nail issues have resolved. She has one loose BM daily. Weight is stable. Take a nap during day time. Reports nocturia which is not new. CT scan of the chets showed stable nodule. CT of the pelvis showed hypodensity in the uterus and prominent cervix. Her last eval by gyn was in 2017-was reportedly normal. Pt denies any bleeding, diacharge or pain.  [de-identified] : pleomophic,

## 2019-02-05 NOTE — HISTORY OF PRESENT ILLNESS
[Disease: _____________________] : Disease: [unfilled] [T: ___] : T[unfilled] [N: ___] : N[unfilled] [M: ___] : M[unfilled] [AJCC Stage: ____] : AJCC Stage: [unfilled] [de-identified] : Ms. Mcintyre is a pleasant 68-year-old woman with recently diagnosed lung adeno ca, here for a follow up visit. \par Munira hx- Ms. 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 She was started on Gilotrif 30 mg in 01/2018 which she did not tolerate well due to cutaneous toxicity for which dose gilotrif at reduced dose of 20mg June 14th. She is tolerating this well without return of previous skin toxicity experienced at higher dose. Pt has no new complaints. She reports no rash, nail issues have resolved. She has one loose BM daily. Weight is stable. Take a nap during day time. Reports nocturia which is not new. CT scan of the chets showed stable nodule. CT of the pelvis showed hypodensity in the uterus and prominent cervix. Her last eval by gyn was in 2017-was reportedly normal. Pt denies any bleeding, diacharge or pain.  [de-identified] : pleomophic,

## 2019-02-05 NOTE — PHYSICAL EXAM
[Fully active, able to carry on all pre-disease performance without restriction] : Status 0 - Fully active, able to carry on all pre-disease performance without restriction [Normal] : affect appropriate [de-identified] : congenital deafness- communicates with sign language. Facial rash resolved, hypertrichosis [de-identified] : 1+ edema [de-identified] : facial rash resolved, L 2nd toe with bandage following minor surgical procedure

## 2019-02-05 NOTE — ASSESSMENT
[FreeTextEntry1] : 68 yo w with recently diagnosed lung ca on afatinib 20mg daily now with significant improvement of previous skin toxcity\par \par Plan:\par -Will continue afatinib dose reduced 20mg daily\par -minocycline once  day\par -Repeat scans in January reviewed. Stable disease. Continue with current treatment plan. CT pelvis shows hypodensity in the uterus. Follow up with gyn\par -Skin care and nail care reiterated\par -imodium for diarrhea\par -LE extremity-No DVT- continue Furosemide 20mg QOD for management of peripheral edema\par -OV in 2 months\par \par  [Palliative] : Goals of care discussed with patient: Palliative [Palliative Care Plan] : Patient was apprised of incurable nature of disease.  Hospice and Palliative care options discussed.

## 2019-02-11 ENCOUNTER — MEDICATION RENEWAL (OUTPATIENT)
Age: 69
End: 2019-02-11

## 2019-02-20 LAB
ALBUMIN SERPL ELPH-MCNC: 3.2 G/DL
ALP BLD-CCNC: 95 U/L
ALT SERPL-CCNC: 22 U/L
ANION GAP SERPL CALC-SCNC: 11 MMOL/L
AST SERPL-CCNC: 20 U/L
BILIRUB SERPL-MCNC: 0.2 MG/DL
BUN SERPL-MCNC: 30 MG/DL
CALCIUM SERPL-MCNC: 8.5 MG/DL
CEA SERPL-MCNC: 1.8 NG/ML
CHLORIDE SERPL-SCNC: 105 MMOL/L
CO2 SERPL-SCNC: 25 MMOL/L
CREAT SERPL-MCNC: 0.88 MG/DL
GLUCOSE SERPL-MCNC: 94 MG/DL
POTASSIUM SERPL-SCNC: 3.8 MMOL/L
PROT SERPL-MCNC: 6.2 G/DL
SODIUM SERPL-SCNC: 141 MMOL/L
TSH SERPL-ACNC: 1.81 UIU/ML

## 2019-03-05 ENCOUNTER — TRANSCRIPTION ENCOUNTER (OUTPATIENT)
Age: 69
End: 2019-03-05

## 2019-03-05 ENCOUNTER — FORM ENCOUNTER (OUTPATIENT)
Age: 69
End: 2019-03-05

## 2019-03-06 ENCOUNTER — APPOINTMENT (OUTPATIENT)
Dept: INTERNAL MEDICINE | Facility: CLINIC | Age: 69
End: 2019-03-06
Payer: MEDICARE

## 2019-03-06 ENCOUNTER — OUTPATIENT (OUTPATIENT)
Dept: OUTPATIENT SERVICES | Facility: HOSPITAL | Age: 69
LOS: 1 days | End: 2019-03-06
Payer: MEDICARE

## 2019-03-06 ENCOUNTER — APPOINTMENT (OUTPATIENT)
Age: 69
End: 2019-03-06
Payer: MEDICARE

## 2019-03-06 VITALS
SYSTOLIC BLOOD PRESSURE: 130 MMHG | BODY MASS INDEX: 30.36 KG/M2 | DIASTOLIC BLOOD PRESSURE: 80 MMHG | HEART RATE: 69 BPM | HEIGHT: 62 IN | OXYGEN SATURATION: 98 % | WEIGHT: 165 LBS

## 2019-03-06 VITALS
HEIGHT: 62 IN | WEIGHT: 165 LBS | SYSTOLIC BLOOD PRESSURE: 124 MMHG | BODY MASS INDEX: 30.36 KG/M2 | DIASTOLIC BLOOD PRESSURE: 70 MMHG

## 2019-03-06 DIAGNOSIS — Z98.89 OTHER SPECIFIED POSTPROCEDURAL STATES: Chronic | ICD-10-CM

## 2019-03-06 DIAGNOSIS — Z98.49 CATARACT EXTRACTION STATUS, UNSPECIFIED EYE: Chronic | ICD-10-CM

## 2019-03-06 DIAGNOSIS — R93.89 ABNORMAL FINDINGS ON DIAGNOSTIC IMAGING OF OTHER SPECIFIED BODY STRUCTURES: ICD-10-CM

## 2019-03-06 DIAGNOSIS — M23.91 UNSPECIFIED INTERNAL DERANGEMENT OF RIGHT KNEE: Chronic | ICD-10-CM

## 2019-03-06 PROCEDURE — 76830 TRANSVAGINAL US NON-OB: CPT

## 2019-03-06 PROCEDURE — 76830 TRANSVAGINAL US NON-OB: CPT | Mod: 26

## 2019-03-06 PROCEDURE — G0439: CPT

## 2019-03-06 PROCEDURE — 99213 OFFICE O/P EST LOW 20 MIN: CPT

## 2019-03-06 NOTE — CHIEF COMPLAINT
[Follow Up] : follow up GYN visit [FreeTextEntry1] : Patient being treated for lung cancer\par She underwent a CT scan which showed a question of a prominent endometrial canal and cervix\par Pap was negative 2017 \par She denies vaginal bleeding

## 2019-03-06 NOTE — PHYSICAL EXAM
[Normal] : uterus [No Bleeding] : there was no active vaginal bleeding [Uterine Adnexae] : were not tender and not enlarged [FreeTextEntry5] : normal appearing

## 2019-03-07 LAB
25(OH)D3 SERPL-MCNC: 51.3 NG/ML
CHOLEST SERPL-MCNC: 141 MG/DL
CHOLEST/HDLC SERPL: 3.8 RATIO
HBA1C MFR BLD HPLC: 5.6 %
HDLC SERPL-MCNC: 37 MG/DL
LDLC SERPL CALC-MCNC: 57 MG/DL
TRIGL SERPL-MCNC: 236 MG/DL

## 2019-03-11 LAB — CYTOLOGY CVX/VAG DOC THIN PREP: NORMAL

## 2019-03-19 RX ORDER — ESOMEPRAZOLE MAGNESIUM 40 MG/1
40 CAPSULE, DELAYED RELEASE ORAL DAILY
Qty: 30 | Refills: 6 | Status: DISCONTINUED | COMMUNITY
Start: 2018-10-16 | End: 2019-03-19

## 2019-03-19 RX ORDER — AMOXICILLIN AND CLAVULANATE POTASSIUM 500; 125 MG/1; MG/1
500-125 TABLET, FILM COATED ORAL 3 TIMES DAILY
Qty: 30 | Refills: 0 | Status: DISCONTINUED | COMMUNITY
Start: 2018-10-16 | End: 2019-03-19

## 2019-03-25 ENCOUNTER — RESULT REVIEW (OUTPATIENT)
Age: 69
End: 2019-03-25

## 2019-04-11 ENCOUNTER — OUTPATIENT (OUTPATIENT)
Dept: OUTPATIENT SERVICES | Facility: HOSPITAL | Age: 69
LOS: 1 days | Discharge: HOME | End: 2019-04-11

## 2019-04-11 DIAGNOSIS — Z98.89 OTHER SPECIFIED POSTPROCEDURAL STATES: Chronic | ICD-10-CM

## 2019-04-11 DIAGNOSIS — M23.91 UNSPECIFIED INTERNAL DERANGEMENT OF RIGHT KNEE: Chronic | ICD-10-CM

## 2019-04-11 DIAGNOSIS — C34.90 MALIGNANT NEOPLASM OF UNSPECIFIED PART OF UNSPECIFIED BRONCHUS OR LUNG: ICD-10-CM

## 2019-04-11 DIAGNOSIS — Z98.49 CATARACT EXTRACTION STATUS, UNSPECIFIED EYE: Chronic | ICD-10-CM

## 2019-04-17 ENCOUNTER — APPOINTMENT (OUTPATIENT)
Dept: HEMATOLOGY ONCOLOGY | Facility: CLINIC | Age: 69
End: 2019-04-17
Payer: MEDICARE

## 2019-04-17 VITALS
SYSTOLIC BLOOD PRESSURE: 134 MMHG | OXYGEN SATURATION: 99 % | TEMPERATURE: 97.3 F | RESPIRATION RATE: 16 BRPM | WEIGHT: 166.45 LBS | BODY MASS INDEX: 30.44 KG/M2 | DIASTOLIC BLOOD PRESSURE: 78 MMHG | HEART RATE: 71 BPM

## 2019-04-17 PROCEDURE — 99215 OFFICE O/P EST HI 40 MIN: CPT

## 2019-04-17 NOTE — ASSESSMENT
[Palliative] : Goals of care discussed with patient: Palliative [FreeTextEntry1] : 68 yo w with recently diagnosed lung ca on afatinib 20mg daily now with significant improvement of previous skin toxcity\par \par Plan:\par -Will continue afatinib dose reduced 20mg daily\par -minocycline once  day\par -Repeat scans in January reviewed. Stable disease. Continue with current treatment plan. CT pelvis shows hypodensity in the uterus. As per pt, gyn exam was normal.\par -Skin care and nail care reiterated\par -imodium for diarrhea\par -LE extremity-No DVT- continue Furosemide 20mg QOD for management of peripheral edema\par -OV in 1 month after repeat scans\par \par  [Palliative Care Plan] : Patient was apprised of incurable nature of disease.  Hospice and Palliative care options discussed.

## 2019-04-17 NOTE — HISTORY OF PRESENT ILLNESS
[Disease: _____________________] : Disease: [unfilled] [T: ___] : T[unfilled] [M: ___] : M[unfilled] [N: ___] : N[unfilled] [AJCC Stage: ____] : AJCC Stage: [unfilled] [de-identified] : Ms. Mcintyre is a pleasant 69-year-old woman with recently diagnosed lung adeno ca, here for a follow up visit. \par Munira hx- Ms. 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 She was started on Gilotrif 30 mg in 01/2018 which she did not tolerate well due to cutaneous toxicity for which dose gilotrif at reduced dose of 20mg June 14th. She is tolerating this well without return of previous skin toxicity experienced at higher dose. Pt has no new complaints. She reports no rash, nail issues have resolved. She has one loose BM daily. Weight is stable. Take a nap during day time. Reports nocturia which is not new. CT scan of the chest in Jan 2019 showed stable nodule. CT of the pelvis showed hypodensity in the uterus and prominent cervix. She has since seen her gyn and everything was reportedly normal.  [de-identified] : pleomophic,

## 2019-04-17 NOTE — PHYSICAL EXAM
[Fully active, able to carry on all pre-disease performance without restriction] : Status 0 - Fully active, able to carry on all pre-disease performance without restriction [Normal] : affect appropriate [de-identified] : congenital deafness- communicates with sign language. Facial rash resolved, hypertrichosis [de-identified] : facial rash resolved, L 2nd toe with bandage following minor surgical procedure [de-identified] : 1+ edema

## 2019-04-17 NOTE — REVIEW OF SYSTEMS
[Fatigue] : no fatigue [Recent Change In Weight] : ~T recent weight change [Chest Pain] : no chest pain [Shortness Of Breath] : no shortness of breath [SOB on Exertion] : no shortness of breath during exertion [Cough] : no cough [Negative] : Allergic/Immunologic [de-identified] : as above [FreeTextEntry2] : stable weight

## 2019-04-19 ENCOUNTER — RX RENEWAL (OUTPATIENT)
Age: 69
End: 2019-04-19

## 2019-04-22 ENCOUNTER — FORM ENCOUNTER (OUTPATIENT)
Age: 69
End: 2019-04-22

## 2019-04-23 ENCOUNTER — APPOINTMENT (OUTPATIENT)
Dept: CT IMAGING | Facility: IMAGING CENTER | Age: 69
End: 2019-04-23
Payer: MEDICARE

## 2019-04-23 ENCOUNTER — OUTPATIENT (OUTPATIENT)
Dept: OUTPATIENT SERVICES | Facility: HOSPITAL | Age: 69
LOS: 1 days | End: 2019-04-23
Payer: MEDICARE

## 2019-04-23 DIAGNOSIS — Z98.49 CATARACT EXTRACTION STATUS, UNSPECIFIED EYE: Chronic | ICD-10-CM

## 2019-04-23 DIAGNOSIS — C34.90 MALIGNANT NEOPLASM OF UNSPECIFIED PART OF UNSPECIFIED BRONCHUS OR LUNG: ICD-10-CM

## 2019-04-23 DIAGNOSIS — Z98.89 OTHER SPECIFIED POSTPROCEDURAL STATES: Chronic | ICD-10-CM

## 2019-04-23 DIAGNOSIS — M23.91 UNSPECIFIED INTERNAL DERANGEMENT OF RIGHT KNEE: Chronic | ICD-10-CM

## 2019-04-23 PROCEDURE — 71260 CT THORAX DX C+: CPT

## 2019-04-23 PROCEDURE — 74160 CT ABDOMEN W/CONTRAST: CPT | Mod: 26

## 2019-04-23 PROCEDURE — 71260 CT THORAX DX C+: CPT | Mod: 26

## 2019-04-23 PROCEDURE — 82565 ASSAY OF CREATININE: CPT

## 2019-04-23 PROCEDURE — 74160 CT ABDOMEN W/CONTRAST: CPT

## 2019-04-29 ENCOUNTER — APPOINTMENT (OUTPATIENT)
Dept: OBGYN | Facility: CLINIC | Age: 69
End: 2019-04-29
Payer: MEDICARE

## 2019-04-29 VITALS
HEIGHT: 62 IN | DIASTOLIC BLOOD PRESSURE: 82 MMHG | SYSTOLIC BLOOD PRESSURE: 140 MMHG | WEIGHT: 166 LBS | BODY MASS INDEX: 30.55 KG/M2

## 2019-04-29 PROCEDURE — 99213 OFFICE O/P EST LOW 20 MIN: CPT

## 2019-04-29 NOTE — CHIEF COMPLAINT
[Follow Up] : follow up GYN visit [FreeTextEntry1] : patient here for follow up of sonogram\par Patient had a CT as surveillance for lung cancer\par She was found to have a thickened endometrium\par Patient on sonogram has a 7 mm endometrium with cystic spaces\par She denies vaginal bleeding

## 2019-05-20 ENCOUNTER — APPOINTMENT (OUTPATIENT)
Dept: OBGYN | Facility: CLINIC | Age: 69
End: 2019-05-20
Payer: MEDICARE

## 2019-05-20 VITALS
BODY MASS INDEX: 26.96 KG/M2 | HEIGHT: 62 IN | WEIGHT: 146.5 LBS | SYSTOLIC BLOOD PRESSURE: 110 MMHG | DIASTOLIC BLOOD PRESSURE: 70 MMHG

## 2019-05-20 DIAGNOSIS — R93.89 ABNORMAL FINDINGS ON DIAGNOSTIC IMAGING OF OTHER SPECIFIED BODY STRUCTURES: ICD-10-CM

## 2019-05-20 PROCEDURE — 99213 OFFICE O/P EST LOW 20 MIN: CPT

## 2019-05-20 NOTE — CHIEF COMPLAINT
[Follow Up] : follow up GYN visit [FreeTextEntry1] : Patient with lining of 7 mm. She could not do a sonohysterogram\par She has no bleeding

## 2019-05-21 ENCOUNTER — OUTPATIENT (OUTPATIENT)
Dept: OUTPATIENT SERVICES | Facility: HOSPITAL | Age: 69
LOS: 1 days | Discharge: HOME | End: 2019-05-21

## 2019-05-21 DIAGNOSIS — Z98.89 OTHER SPECIFIED POSTPROCEDURAL STATES: Chronic | ICD-10-CM

## 2019-05-21 DIAGNOSIS — Z98.49 CATARACT EXTRACTION STATUS, UNSPECIFIED EYE: Chronic | ICD-10-CM

## 2019-05-21 DIAGNOSIS — M23.91 UNSPECIFIED INTERNAL DERANGEMENT OF RIGHT KNEE: Chronic | ICD-10-CM

## 2019-05-21 DIAGNOSIS — C34.90 MALIGNANT NEOPLASM OF UNSPECIFIED PART OF UNSPECIFIED BRONCHUS OR LUNG: ICD-10-CM

## 2019-05-22 ENCOUNTER — APPOINTMENT (OUTPATIENT)
Dept: HEMATOLOGY ONCOLOGY | Facility: CLINIC | Age: 69
End: 2019-05-22
Payer: MEDICARE

## 2019-05-22 ENCOUNTER — RESULT REVIEW (OUTPATIENT)
Age: 69
End: 2019-05-22

## 2019-05-22 VITALS
SYSTOLIC BLOOD PRESSURE: 145 MMHG | TEMPERATURE: 98.1 F | RESPIRATION RATE: 14 BRPM | DIASTOLIC BLOOD PRESSURE: 76 MMHG | WEIGHT: 164.22 LBS | HEART RATE: 73 BPM | OXYGEN SATURATION: 98 % | BODY MASS INDEX: 30.04 KG/M2

## 2019-05-22 DIAGNOSIS — H91.93 UNSPECIFIED HEARING LOSS, BILATERAL: ICD-10-CM

## 2019-05-22 DIAGNOSIS — R60.0 LOCALIZED EDEMA: ICD-10-CM

## 2019-05-22 LAB
BASOPHILS # BLD AUTO: 0 K/UL — SIGNIFICANT CHANGE UP (ref 0–0.2)
BASOPHILS NFR BLD AUTO: 0.4 % — SIGNIFICANT CHANGE UP (ref 0–2)
EOSINOPHIL # BLD AUTO: 0.2 K/UL — SIGNIFICANT CHANGE UP (ref 0–0.5)
EOSINOPHIL NFR BLD AUTO: 2.5 % — SIGNIFICANT CHANGE UP (ref 0–6)
HCT VFR BLD CALC: 39.3 % — SIGNIFICANT CHANGE UP (ref 34.5–45)
HGB BLD-MCNC: 13.8 G/DL — SIGNIFICANT CHANGE UP (ref 11.5–15.5)
LYMPHOCYTES # BLD AUTO: 2.5 K/UL — SIGNIFICANT CHANGE UP (ref 1–3.3)
LYMPHOCYTES # BLD AUTO: 27.9 % — SIGNIFICANT CHANGE UP (ref 13–44)
MCHC RBC-ENTMCNC: 32.4 PG — SIGNIFICANT CHANGE UP (ref 27–34)
MCHC RBC-ENTMCNC: 35.1 G/DL — SIGNIFICANT CHANGE UP (ref 32–36)
MCV RBC AUTO: 92.5 FL — SIGNIFICANT CHANGE UP (ref 80–100)
MONOCYTES # BLD AUTO: 0.8 K/UL — SIGNIFICANT CHANGE UP (ref 0–0.9)
MONOCYTES NFR BLD AUTO: 8.5 % — SIGNIFICANT CHANGE UP (ref 2–14)
NEUTROPHILS # BLD AUTO: 5.5 K/UL — SIGNIFICANT CHANGE UP (ref 1.8–7.4)
NEUTROPHILS NFR BLD AUTO: 60.7 % — SIGNIFICANT CHANGE UP (ref 43–77)
PLATELET # BLD AUTO: 237 K/UL — SIGNIFICANT CHANGE UP (ref 150–400)
RBC # BLD: 4.25 M/UL — SIGNIFICANT CHANGE UP (ref 3.8–5.2)
RBC # FLD: 12.1 % — SIGNIFICANT CHANGE UP (ref 10.3–14.5)
WBC # BLD: 9 K/UL — SIGNIFICANT CHANGE UP (ref 3.8–10.5)
WBC # FLD AUTO: 9 K/UL — SIGNIFICANT CHANGE UP (ref 3.8–10.5)

## 2019-05-22 PROCEDURE — 99215 OFFICE O/P EST HI 40 MIN: CPT

## 2019-05-22 RX ORDER — TOBRAMYCIN AND DEXAMETHASONE 3; 1 MG/ML; MG/ML
0.3-0.1 SUSPENSION/ DROPS OPHTHALMIC
Qty: 5 | Refills: 5 | Status: DISCONTINUED | COMMUNITY
Start: 2018-01-25 | End: 2019-05-22

## 2019-05-22 NOTE — PHYSICAL EXAM
[Fully active, able to carry on all pre-disease performance without restriction] : Status 0 - Fully active, able to carry on all pre-disease performance without restriction [Normal] : affect appropriate [de-identified] : left eye blepharitis [de-identified] : congenital deafness- communicates with sign language. Facial rash resolved, hypertrichosis [de-identified] : 1+ edema [de-identified] : facial rash resolved, L 2nd toe with bandage following minor surgical procedure

## 2019-05-22 NOTE — HISTORY OF PRESENT ILLNESS
[Disease: _____________________] : Disease: [unfilled] [T: ___] : T[unfilled] [N: ___] : N[unfilled] [M: ___] : M[unfilled] [AJCC Stage: ____] : AJCC Stage: [unfilled] [de-identified] : Ms. Mcintyre is a pleasant 69-year-old woman with recently diagnosed lung adeno ca, here for a follow up visit. \par Breif hx- Ms. 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 She was started on Gilotrif 30 mg in 01/2018 which she did not tolerate well due to cutaneous toxicity for which dose gilotrif at reduced dose of 20mg June 14th. She is tolerating this well without return of previous skin toxicity experienced at higher dose. Pt has no new complaints. She reports no rash, nail issues have resolved. She has one loose BM daily. Weight is stable. Take a nap during day time. Reports nocturia which is not new. CT scan of the chest in Jan 2019 and 04/22/19 show stable nodule. CT of the pelvis showed hypodensity in the uterus and prominent cervix. She has since seen her gyn and everything was reportedly normal. \par No new complaints. Some nail changes- had some podiatric procedures and is completing abx, foot swelling.  [de-identified] : pleomophic,

## 2019-05-22 NOTE — REVIEW OF SYSTEMS
[Fatigue] : no fatigue [Recent Change In Weight] : ~T recent weight change [Chest Pain] : no chest pain [Shortness Of Breath] : no shortness of breath [Cough] : no cough [SOB on Exertion] : no shortness of breath during exertion [Negative] : Allergic/Immunologic [FreeTextEntry2] : stable weight [de-identified] : as above

## 2019-05-22 NOTE — ASSESSMENT
[FreeTextEntry1] : 66 yo w with recently diagnosed lung ca on afatinib 20mg daily now with significant improvement of previous skin toxicity\par \par Plan:\par -Will continue afatinib dose reduced 20mg daily\par -minocycline once  day\par -Repeat scans in January reviewed. Stable disease. Continue with current treatment plan. CT pelvis shows hypodensity in the uterus. As per pt, gyn exam was normal.\par -Skin care and nail care reiterated\par -imodium for diarrhea\par -LE extremity-No DVT- continue Furosemide 20mg QD for management of peripheral edema\par -Reveiwed scans- stable nodule\par \par  [Palliative] : Goals of care discussed with patient: Palliative [Palliative Care Plan] : Patient was apprised of incurable nature of disease.  Hospice and Palliative care options discussed.

## 2019-06-11 LAB
ALBUMIN SERPL ELPH-MCNC: 3.4 G/DL
ALP BLD-CCNC: 142 U/L
ALT SERPL-CCNC: 52 U/L
ANION GAP SERPL CALC-SCNC: 10 MMOL/L
AST SERPL-CCNC: 44 U/L
BILIRUB SERPL-MCNC: 0.3 MG/DL
BUN SERPL-MCNC: 29 MG/DL
CALCIUM SERPL-MCNC: 8.8 MG/DL
CEA SERPL-MCNC: 2.1 NG/ML
CHLORIDE SERPL-SCNC: 104 MMOL/L
CO2 SERPL-SCNC: 27 MMOL/L
CREAT SERPL-MCNC: 1.09 MG/DL
GLUCOSE SERPL-MCNC: 93 MG/DL
MAGNESIUM SERPL-MCNC: 2.1 MG/DL
POTASSIUM SERPL-SCNC: 4.3 MMOL/L
PROT SERPL-MCNC: 6.4 G/DL
SODIUM SERPL-SCNC: 141 MMOL/L
TSH SERPL-ACNC: 1.14 UIU/ML

## 2019-07-17 ENCOUNTER — MOBILE ON CALL (OUTPATIENT)
Age: 69
End: 2019-07-17

## 2019-07-29 ENCOUNTER — APPOINTMENT (OUTPATIENT)
Dept: DERMATOLOGY | Facility: CLINIC | Age: 69
End: 2019-07-29
Payer: MEDICARE

## 2019-07-29 VITALS — DIASTOLIC BLOOD PRESSURE: 80 MMHG | SYSTOLIC BLOOD PRESSURE: 130 MMHG

## 2019-07-29 DIAGNOSIS — D22.9 MELANOCYTIC NEVI, UNSPECIFIED: ICD-10-CM

## 2019-07-29 DIAGNOSIS — L03.039 CELLULITIS OF UNSPECIFIED TOE: ICD-10-CM

## 2019-07-29 PROCEDURE — 99214 OFFICE O/P EST MOD 30 MIN: CPT | Mod: GC

## 2019-08-21 ENCOUNTER — OUTPATIENT (OUTPATIENT)
Dept: OUTPATIENT SERVICES | Facility: HOSPITAL | Age: 69
LOS: 1 days | Discharge: HOME | End: 2019-08-21

## 2019-08-21 DIAGNOSIS — C34.90 MALIGNANT NEOPLASM OF UNSPECIFIED PART OF UNSPECIFIED BRONCHUS OR LUNG: ICD-10-CM

## 2019-08-21 DIAGNOSIS — Z98.89 OTHER SPECIFIED POSTPROCEDURAL STATES: Chronic | ICD-10-CM

## 2019-08-21 DIAGNOSIS — M23.91 UNSPECIFIED INTERNAL DERANGEMENT OF RIGHT KNEE: Chronic | ICD-10-CM

## 2019-08-21 DIAGNOSIS — Z98.49 CATARACT EXTRACTION STATUS, UNSPECIFIED EYE: Chronic | ICD-10-CM

## 2019-08-23 ENCOUNTER — RESULT REVIEW (OUTPATIENT)
Age: 69
End: 2019-08-23

## 2019-08-23 ENCOUNTER — APPOINTMENT (OUTPATIENT)
Dept: HEMATOLOGY ONCOLOGY | Facility: CLINIC | Age: 69
End: 2019-08-23
Payer: MEDICARE

## 2019-08-23 VITALS
RESPIRATION RATE: 16 BRPM | OXYGEN SATURATION: 98 % | TEMPERATURE: 98 F | SYSTOLIC BLOOD PRESSURE: 154 MMHG | HEART RATE: 68 BPM | DIASTOLIC BLOOD PRESSURE: 81 MMHG | WEIGHT: 165.77 LBS | BODY MASS INDEX: 30.32 KG/M2

## 2019-08-23 DIAGNOSIS — L03.019 CELLULITIS OF UNSPECIFIED FINGER: ICD-10-CM

## 2019-08-23 LAB
BASOPHILS # BLD AUTO: 0 K/UL — SIGNIFICANT CHANGE UP (ref 0–0.2)
BASOPHILS NFR BLD AUTO: 0.5 % — SIGNIFICANT CHANGE UP (ref 0–2)
EOSINOPHIL # BLD AUTO: 0.4 K/UL — SIGNIFICANT CHANGE UP (ref 0–0.5)
EOSINOPHIL NFR BLD AUTO: 4.7 % — SIGNIFICANT CHANGE UP (ref 0–6)
HCT VFR BLD CALC: 40.2 % — SIGNIFICANT CHANGE UP (ref 34.5–45)
HGB BLD-MCNC: 13.3 G/DL — SIGNIFICANT CHANGE UP (ref 11.5–15.5)
LYMPHOCYTES # BLD AUTO: 2.3 K/UL — SIGNIFICANT CHANGE UP (ref 1–3.3)
LYMPHOCYTES # BLD AUTO: 28.3 % — SIGNIFICANT CHANGE UP (ref 13–44)
MCHC RBC-ENTMCNC: 31.9 PG — SIGNIFICANT CHANGE UP (ref 27–34)
MCHC RBC-ENTMCNC: 33.1 G/DL — SIGNIFICANT CHANGE UP (ref 32–36)
MCV RBC AUTO: 96.2 FL — SIGNIFICANT CHANGE UP (ref 80–100)
MONOCYTES # BLD AUTO: 0.6 K/UL — SIGNIFICANT CHANGE UP (ref 0–0.9)
MONOCYTES NFR BLD AUTO: 7.8 % — SIGNIFICANT CHANGE UP (ref 2–14)
NEUTROPHILS # BLD AUTO: 4.7 K/UL — SIGNIFICANT CHANGE UP (ref 1.8–7.4)
NEUTROPHILS NFR BLD AUTO: 58.7 % — SIGNIFICANT CHANGE UP (ref 43–77)
PLATELET # BLD AUTO: 219 K/UL — SIGNIFICANT CHANGE UP (ref 150–400)
RBC # BLD: 4.18 M/UL — SIGNIFICANT CHANGE UP (ref 3.8–5.2)
RBC # FLD: 13.6 % — SIGNIFICANT CHANGE UP (ref 10.3–14.5)
WBC # BLD: 8 K/UL — SIGNIFICANT CHANGE UP (ref 3.8–10.5)
WBC # FLD AUTO: 8 K/UL — SIGNIFICANT CHANGE UP (ref 3.8–10.5)

## 2019-08-23 PROCEDURE — 99215 OFFICE O/P EST HI 40 MIN: CPT

## 2019-08-23 NOTE — REASON FOR VISIT
[Follow-Up Visit] : a follow-up [Spouse] : spouse [FreeTextEntry2] : lung cancer Patient needs further psychiatric safety assessment prior to discharge Patient needs further psychiatric safety assessment prior to discharge Patient needs further psychiatric safety assessment prior to discharge

## 2019-08-23 NOTE — PHYSICAL EXAM
[Fully active, able to carry on all pre-disease performance without restriction] : Status 0 - Fully active, able to carry on all pre-disease performance without restriction [Normal] : affect appropriate [de-identified] : left eye blepharitis [de-identified] : congenital deafness- communicates with sign language. Facial rash resolved, hypertrichosis [de-identified] : 1+ edema [de-identified] : facial rash resolved, L 2nd toe with bandage following minor surgical procedure

## 2019-08-23 NOTE — ASSESSMENT
[Palliative] : Goals of care discussed with patient: Palliative [Palliative Care Plan] : Patient was apprised of incurable nature of disease.  Hospice and Palliative care options discussed. [FreeTextEntry1] : 68 yo w with recently diagnosed lung ca on afatinib 20mg daily now with significant improvement of previous skin toxicity\par \par Plan:\par -Will continue afatinib dose reduced 20mg daily. Tolerating this dose very well. \par -Continue minocycline twice a  day\par -Repeat images from scans in April reviewed.\par -Order repeat scans\par -OV in 2 months\par -Skin care and nail care reiterated\par -Imodium for diarrhea\par -External hemorrhoids- see GI\par -LE extremity-No DVT- continue Furosemide 20 mg QoD for management of peripheral edema\par -OV in 2 months after the scans\par \par \par

## 2019-08-23 NOTE — REVIEW OF SYSTEMS
[Recent Change In Weight] : ~T recent weight change [Negative] : Allergic/Immunologic [Fatigue] : no fatigue [Chest Pain] : no chest pain [Shortness Of Breath] : no shortness of breath [Cough] : no cough [SOB on Exertion] : no shortness of breath during exertion [de-identified] : as above

## 2019-08-23 NOTE — HISTORY OF PRESENT ILLNESS
[T: ___] : T[unfilled] [Disease: _____________________] : Disease: [unfilled] [N: ___] : N[unfilled] [M: ___] : M[unfilled] [AJCC Stage: ____] : AJCC Stage: [unfilled] [de-identified] : Ms. Mcintyre is a pleasant 69-year-old woman with recently diagnosed lung adeno ca, here for a follow up visit. \par Breif hx- Ms. 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 She was started on Gilotrif 30 mg in 01/2018 which she did not tolerate well due to cutaneous toxicity for which dose gilotrif at reduced dose of 20mg June 14th. She is tolerating this well without return of previous skin toxicity experienced at higher dose. Pt has no new complaints. She reports no rash, nail issues have resolved. She has one loose BM daily. Weight is stable. Take a nap during day time. Reports nocturia which is not new. CT scan of the chest in Jan 2019 and 04/22/19 show stable nodule. CT of the pelvis showed hypodensity in the uterus and prominent cervix. She has since seen her gyn and everything was reportedly normal. \par No new complaints. Some nail changes- had some podiatric procedures and is completing abx, foot swelling. \par \par 8/23/19: Some nasal congestion. No rash but still nail changes. Eating well and gaining weight. [de-identified] : pleomophic,

## 2019-09-09 ENCOUNTER — APPOINTMENT (OUTPATIENT)
Dept: INTERNAL MEDICINE | Facility: CLINIC | Age: 69
End: 2019-09-09

## 2019-09-12 ENCOUNTER — FORM ENCOUNTER (OUTPATIENT)
Age: 69
End: 2019-09-12

## 2019-09-13 ENCOUNTER — OUTPATIENT (OUTPATIENT)
Dept: OUTPATIENT SERVICES | Facility: HOSPITAL | Age: 69
LOS: 1 days | End: 2019-09-13
Payer: MEDICARE

## 2019-09-13 ENCOUNTER — APPOINTMENT (OUTPATIENT)
Dept: CT IMAGING | Facility: IMAGING CENTER | Age: 69
End: 2019-09-13
Payer: MEDICARE

## 2019-09-13 DIAGNOSIS — Z98.49 CATARACT EXTRACTION STATUS, UNSPECIFIED EYE: Chronic | ICD-10-CM

## 2019-09-13 DIAGNOSIS — M23.91 UNSPECIFIED INTERNAL DERANGEMENT OF RIGHT KNEE: Chronic | ICD-10-CM

## 2019-09-13 DIAGNOSIS — Z98.89 OTHER SPECIFIED POSTPROCEDURAL STATES: Chronic | ICD-10-CM

## 2019-09-13 DIAGNOSIS — C34.90 MALIGNANT NEOPLASM OF UNSPECIFIED PART OF UNSPECIFIED BRONCHUS OR LUNG: ICD-10-CM

## 2019-09-13 LAB
ALBUMIN SERPL ELPH-MCNC: 3.6 G/DL
ALP BLD-CCNC: 165 U/L
ALT SERPL-CCNC: 45 U/L
ANION GAP SERPL CALC-SCNC: 15 MMOL/L
AST SERPL-CCNC: 38 U/L
BILIRUB SERPL-MCNC: 0.2 MG/DL
BUN SERPL-MCNC: 34 MG/DL
CALCIUM SERPL-MCNC: 9.5 MG/DL
CEA SERPL-MCNC: 2 NG/ML
CHLORIDE SERPL-SCNC: 107 MMOL/L
CO2 SERPL-SCNC: 22 MMOL/L
CREAT SERPL-MCNC: 0.94 MG/DL
GLUCOSE SERPL-MCNC: 99 MG/DL
POTASSIUM SERPL-SCNC: 4 MMOL/L
PROT SERPL-MCNC: 6.3 G/DL
SODIUM SERPL-SCNC: 143 MMOL/L
TSH SERPL-ACNC: 1.8 UIU/ML

## 2019-09-13 PROCEDURE — 74160 CT ABDOMEN W/CONTRAST: CPT

## 2019-09-13 PROCEDURE — 71260 CT THORAX DX C+: CPT | Mod: 26

## 2019-09-13 PROCEDURE — 71260 CT THORAX DX C+: CPT

## 2019-09-13 PROCEDURE — 74160 CT ABDOMEN W/CONTRAST: CPT | Mod: 26

## 2019-09-24 ENCOUNTER — APPOINTMENT (OUTPATIENT)
Dept: INTERNAL MEDICINE | Facility: CLINIC | Age: 69
End: 2019-09-24
Payer: MEDICARE

## 2019-09-24 VITALS
HEART RATE: 71 BPM | DIASTOLIC BLOOD PRESSURE: 70 MMHG | HEIGHT: 62 IN | SYSTOLIC BLOOD PRESSURE: 110 MMHG | OXYGEN SATURATION: 98 % | BODY MASS INDEX: 30.18 KG/M2 | WEIGHT: 164 LBS

## 2019-09-24 PROCEDURE — 99214 OFFICE O/P EST MOD 30 MIN: CPT | Mod: 25

## 2019-09-24 PROCEDURE — G0444 DEPRESSION SCREEN ANNUAL: CPT

## 2019-09-24 PROCEDURE — 90662 IIV NO PRSV INCREASED AG IM: CPT

## 2019-09-24 PROCEDURE — G0008: CPT

## 2019-09-25 NOTE — HISTORY OF PRESENT ILLNESS
[FreeTextEntry1] : Initial visit with Ms Mcinytre since her prior PCP Dr Zambrano retired from seeing patients\par Here with  [de-identified] : Reports she is doing well\par c/o hemorrhoids - some bleeding - has appt with GI  pending. Reports she had a colonoscopy 1-2 years ago - she will see if another is indicated.

## 2019-09-25 NOTE — ASSESSMENT
[FreeTextEntry1] : Annual depression screen completed this visit.  PHQ 9 completed and reviewed.  Screening not suggestive of diagnosis of MDD.

## 2019-09-25 NOTE — PHYSICAL EXAM
[Normal Sclera/Conjunctiva] : normal sclera/conjunctiva [PERRL] : pupils equal round and reactive to light [EOMI] : extraocular movements intact [Normal Outer Ear/Nose] : the outer ears and nose were normal in appearance [No Lymphadenopathy] : no lymphadenopathy [Supple] : supple [No Respiratory Distress] : no respiratory distress  [Clear to Auscultation] : lungs were clear to auscultation bilaterally [Normal Rate] : normal rate  [Regular Rhythm] : with a regular rhythm [Pedal Pulses Present] : the pedal pulses are present [___ +] : bilateral [unfilled]U+ pretibial pitting edema [Soft] : abdomen soft [Non Tender] : non-tender [Normal Bowel Sounds] : normal bowel sounds [No CVA Tenderness] : no CVA  tenderness [No Joint Swelling] : no joint swelling [Grossly Normal Strength/Tone] : grossly normal strength/tone [No Rash] : no rash [Normal] : normal texture and mobility [Coordination Grossly Intact] : coordination grossly intact [No Focal Deficits] : no focal deficits [Normal Gait] : normal gait [Deep Tendon Reflexes (DTR)] : deep tendon reflexes were 2+ and symmetric

## 2019-10-02 ENCOUNTER — OUTPATIENT (OUTPATIENT)
Dept: OUTPATIENT SERVICES | Facility: HOSPITAL | Age: 69
LOS: 1 days | Discharge: HOME | End: 2019-10-02

## 2019-10-02 DIAGNOSIS — Z98.89 OTHER SPECIFIED POSTPROCEDURAL STATES: Chronic | ICD-10-CM

## 2019-10-02 DIAGNOSIS — M23.91 UNSPECIFIED INTERNAL DERANGEMENT OF RIGHT KNEE: Chronic | ICD-10-CM

## 2019-10-02 DIAGNOSIS — Z98.49 CATARACT EXTRACTION STATUS, UNSPECIFIED EYE: Chronic | ICD-10-CM

## 2019-10-02 DIAGNOSIS — C34.90 MALIGNANT NEOPLASM OF UNSPECIFIED PART OF UNSPECIFIED BRONCHUS OR LUNG: ICD-10-CM

## 2019-10-04 ENCOUNTER — RESULT REVIEW (OUTPATIENT)
Age: 69
End: 2019-10-04

## 2019-10-04 ENCOUNTER — APPOINTMENT (OUTPATIENT)
Dept: HEMATOLOGY ONCOLOGY | Facility: CLINIC | Age: 69
End: 2019-10-04
Payer: MEDICARE

## 2019-10-04 VITALS
WEIGHT: 163.14 LBS | DIASTOLIC BLOOD PRESSURE: 78 MMHG | RESPIRATION RATE: 18 BRPM | SYSTOLIC BLOOD PRESSURE: 150 MMHG | TEMPERATURE: 97.6 F | BODY MASS INDEX: 29.84 KG/M2 | OXYGEN SATURATION: 98 % | HEART RATE: 71 BPM

## 2019-10-04 LAB
BASOPHILS # BLD AUTO: 0 K/UL — SIGNIFICANT CHANGE UP (ref 0–0.2)
BASOPHILS NFR BLD AUTO: 0.6 % — SIGNIFICANT CHANGE UP (ref 0–2)
EOSINOPHIL # BLD AUTO: 0.4 K/UL — SIGNIFICANT CHANGE UP (ref 0–0.5)
EOSINOPHIL NFR BLD AUTO: 5.5 % — SIGNIFICANT CHANGE UP (ref 0–6)
HCT VFR BLD CALC: 41.6 % — SIGNIFICANT CHANGE UP (ref 34.5–45)
HGB BLD-MCNC: 13.5 G/DL — SIGNIFICANT CHANGE UP (ref 11.5–15.5)
LYMPHOCYTES # BLD AUTO: 2 K/UL — SIGNIFICANT CHANGE UP (ref 1–3.3)
LYMPHOCYTES # BLD AUTO: 31.2 % — SIGNIFICANT CHANGE UP (ref 13–44)
MCHC RBC-ENTMCNC: 31 PG — SIGNIFICANT CHANGE UP (ref 27–34)
MCHC RBC-ENTMCNC: 32.5 G/DL — SIGNIFICANT CHANGE UP (ref 32–36)
MCV RBC AUTO: 95.6 FL — SIGNIFICANT CHANGE UP (ref 80–100)
MONOCYTES # BLD AUTO: 0.6 K/UL — SIGNIFICANT CHANGE UP (ref 0–0.9)
MONOCYTES NFR BLD AUTO: 9 % — SIGNIFICANT CHANGE UP (ref 2–14)
NEUTROPHILS # BLD AUTO: 3.4 K/UL — SIGNIFICANT CHANGE UP (ref 1.8–7.4)
NEUTROPHILS NFR BLD AUTO: 53.7 % — SIGNIFICANT CHANGE UP (ref 43–77)
PLATELET # BLD AUTO: 241 K/UL — SIGNIFICANT CHANGE UP (ref 150–400)
RBC # BLD: 4.35 M/UL — SIGNIFICANT CHANGE UP (ref 3.8–5.2)
RBC # FLD: 12.2 % — SIGNIFICANT CHANGE UP (ref 10.3–14.5)
WBC # BLD: 6.4 K/UL — SIGNIFICANT CHANGE UP (ref 3.8–10.5)
WBC # FLD AUTO: 6.4 K/UL — SIGNIFICANT CHANGE UP (ref 3.8–10.5)

## 2019-10-04 PROCEDURE — 99214 OFFICE O/P EST MOD 30 MIN: CPT

## 2019-10-04 NOTE — ASSESSMENT
[FreeTextEntry1] : 66 yo w with recently diagnosed lung ca on afatinib 20mg daily now with significant improvement of previous skin toxicity\par \par Plan:\par -Will continue afatinib dose reduced 20mg daily. Tolerating this dose very well. \par -Continue minocycline twice a  day\par -Repeat images from scans personally reviewed. Small new 8mm RML pleural nodule. Since PS 0 and no change in tumor markers will monitor on next imaging and send [facility] liquid biopsy to assess for further mutations.\par -Usual BW today\par -OV in 2 months\par -Skin care and nail care reiterated\par -LE extremity-No DVT- continue Furosemide 20 mg QoD PRN for management of peripheral edema\par -OV in 2 \par \par \par  [Palliative Care Plan] : Patient was apprised of incurable nature of disease.  Hospice and Palliative care options discussed. [Palliative] : Goals of care discussed with patient: Palliative

## 2019-10-04 NOTE — REVIEW OF SYSTEMS
[Fatigue] : no fatigue [Recent Change In Weight] : ~T no recent weight change [Shortness Of Breath] : no shortness of breath [Chest Pain] : no chest pain [Cough] : no cough [SOB on Exertion] : no shortness of breath during exertion [Negative] : Heme/Lymph [de-identified] : as above

## 2019-10-04 NOTE — HISTORY OF PRESENT ILLNESS
[Disease: _____________________] : Disease: [unfilled] [T: ___] : T[unfilled] [N: ___] : N[unfilled] [M: ___] : M[unfilled] [AJCC Stage: ____] : AJCC Stage: [unfilled] [de-identified] : Ms. Mcintyre is a pleasant 69-year-old woman with recently diagnosed lung adeno ca, here for a follow up visit. \par Breif hx- Ms. 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 She was started on Gilotrif 30 mg in 01/2018 which she did not tolerate well due to cutaneous toxicity for which dose gilotrif at reduced dose of 20mg June 14th. She is tolerating this well without return of previous skin toxicity experienced at higher dose. Pt has no new complaints. She reports no rash, nail issues have resolved. She has one loose BM daily. Weight is stable. Take a nap during day time. Reports nocturia which is not new. CT scan of the chest in Jan 2019 and 04/22/19 show stable nodule. CT of the pelvis showed hypodensity in the uterus and prominent cervix. She has since seen her gyn and everything was reportedly normal. \par No new complaints. Some nail changes- had some podiatric procedures and is completing abx, foot swelling. \par \par 8/23/19: Some nasal congestion. No rash but still nail changes. Eating well and gaining weight.\par \par 10/4/19: Pt feeling well. Returns for routine f/u and discussion of imaging findings. She offers no new complaints. She currently has a slight flare in usual great toe paronychia. No fevers. On minocycline [de-identified] : pleomophic,

## 2019-10-04 NOTE — PHYSICAL EXAM
[Fully active, able to carry on all pre-disease performance without restriction] : Status 0 - Fully active, able to carry on all pre-disease performance without restriction [Normal] : grossly intact [de-identified] : congenital deafness- communicates with sign language. Facial rash resolved, hypertrichosis [de-identified] : 1+ edema [de-identified] : facial rash resolved, L great toe paronychia

## 2019-10-10 LAB
ALBUMIN SERPL ELPH-MCNC: 3.6 G/DL
ALP BLD-CCNC: 130 U/L
ALT SERPL-CCNC: 38 U/L
ANION GAP SERPL CALC-SCNC: 14 MMOL/L
AST SERPL-CCNC: 38 U/L
BILIRUB SERPL-MCNC: 0.4 MG/DL
BUN SERPL-MCNC: 31 MG/DL
CALCIUM SERPL-MCNC: 9.1 MG/DL
CEA SERPL-MCNC: 2.2 NG/ML
CHLORIDE SERPL-SCNC: 105 MMOL/L
CO2 SERPL-SCNC: 22 MMOL/L
CREAT SERPL-MCNC: 1 MG/DL
GLUCOSE SERPL-MCNC: 93 MG/DL
POTASSIUM SERPL-SCNC: 4.1 MMOL/L
PROT SERPL-MCNC: 6.6 G/DL
SODIUM SERPL-SCNC: 141 MMOL/L

## 2019-10-16 ENCOUNTER — MEDICATION RENEWAL (OUTPATIENT)
Age: 69
End: 2019-10-16

## 2019-12-01 ENCOUNTER — FORM ENCOUNTER (OUTPATIENT)
Age: 69
End: 2019-12-01

## 2019-12-02 ENCOUNTER — OUTPATIENT (OUTPATIENT)
Dept: OUTPATIENT SERVICES | Facility: HOSPITAL | Age: 69
LOS: 1 days | End: 2019-12-02
Payer: MEDICARE

## 2019-12-02 ENCOUNTER — APPOINTMENT (OUTPATIENT)
Dept: MAMMOGRAPHY | Facility: IMAGING CENTER | Age: 69
End: 2019-12-02
Payer: MEDICARE

## 2019-12-02 DIAGNOSIS — M23.91 UNSPECIFIED INTERNAL DERANGEMENT OF RIGHT KNEE: Chronic | ICD-10-CM

## 2019-12-02 DIAGNOSIS — Z98.89 OTHER SPECIFIED POSTPROCEDURAL STATES: Chronic | ICD-10-CM

## 2019-12-02 DIAGNOSIS — Z00.8 ENCOUNTER FOR OTHER GENERAL EXAMINATION: ICD-10-CM

## 2019-12-02 DIAGNOSIS — Z98.49 CATARACT EXTRACTION STATUS, UNSPECIFIED EYE: Chronic | ICD-10-CM

## 2019-12-02 PROCEDURE — 77067 SCR MAMMO BI INCL CAD: CPT | Mod: 26

## 2019-12-02 PROCEDURE — 77063 BREAST TOMOSYNTHESIS BI: CPT | Mod: 26

## 2019-12-02 PROCEDURE — 77067 SCR MAMMO BI INCL CAD: CPT

## 2019-12-02 PROCEDURE — 77063 BREAST TOMOSYNTHESIS BI: CPT

## 2019-12-06 ENCOUNTER — OUTPATIENT (OUTPATIENT)
Dept: OUTPATIENT SERVICES | Facility: HOSPITAL | Age: 69
LOS: 1 days | Discharge: HOME | End: 2019-12-06

## 2019-12-06 DIAGNOSIS — Z98.89 OTHER SPECIFIED POSTPROCEDURAL STATES: Chronic | ICD-10-CM

## 2019-12-06 DIAGNOSIS — Z98.49 CATARACT EXTRACTION STATUS, UNSPECIFIED EYE: Chronic | ICD-10-CM

## 2019-12-06 DIAGNOSIS — C34.90 MALIGNANT NEOPLASM OF UNSPECIFIED PART OF UNSPECIFIED BRONCHUS OR LUNG: ICD-10-CM

## 2019-12-06 DIAGNOSIS — M23.91 UNSPECIFIED INTERNAL DERANGEMENT OF RIGHT KNEE: Chronic | ICD-10-CM

## 2019-12-12 ENCOUNTER — RESULT REVIEW (OUTPATIENT)
Age: 69
End: 2019-12-12

## 2019-12-12 ENCOUNTER — APPOINTMENT (OUTPATIENT)
Dept: HEMATOLOGY ONCOLOGY | Facility: CLINIC | Age: 69
End: 2019-12-12
Payer: MEDICARE

## 2019-12-12 VITALS
DIASTOLIC BLOOD PRESSURE: 72 MMHG | SYSTOLIC BLOOD PRESSURE: 130 MMHG | BODY MASS INDEX: 30.65 KG/M2 | HEART RATE: 71 BPM | RESPIRATION RATE: 16 BRPM | WEIGHT: 167.55 LBS | TEMPERATURE: 98 F | OXYGEN SATURATION: 99 %

## 2019-12-12 LAB
BASOPHILS # BLD AUTO: 0.1 K/UL — SIGNIFICANT CHANGE UP (ref 0–0.2)
BASOPHILS NFR BLD AUTO: 0.8 % — SIGNIFICANT CHANGE UP (ref 0–2)
EOSINOPHIL # BLD AUTO: 0.3 K/UL — SIGNIFICANT CHANGE UP (ref 0–0.5)
EOSINOPHIL NFR BLD AUTO: 5 % — SIGNIFICANT CHANGE UP (ref 0–6)
HCT VFR BLD CALC: 39.2 % — SIGNIFICANT CHANGE UP (ref 34.5–45)
HGB BLD-MCNC: 13.1 G/DL — SIGNIFICANT CHANGE UP (ref 11.5–15.5)
LYMPHOCYTES # BLD AUTO: 2.1 K/UL — SIGNIFICANT CHANGE UP (ref 1–3.3)
LYMPHOCYTES # BLD AUTO: 30.3 % — SIGNIFICANT CHANGE UP (ref 13–44)
MCHC RBC-ENTMCNC: 31.9 PG — SIGNIFICANT CHANGE UP (ref 27–34)
MCHC RBC-ENTMCNC: 33.3 G/DL — SIGNIFICANT CHANGE UP (ref 32–36)
MCV RBC AUTO: 95.6 FL — SIGNIFICANT CHANGE UP (ref 80–100)
MONOCYTES # BLD AUTO: 0.5 K/UL — SIGNIFICANT CHANGE UP (ref 0–0.9)
MONOCYTES NFR BLD AUTO: 6.9 % — SIGNIFICANT CHANGE UP (ref 2–14)
NEUTROPHILS # BLD AUTO: 3.9 K/UL — SIGNIFICANT CHANGE UP (ref 1.8–7.4)
NEUTROPHILS NFR BLD AUTO: 56.9 % — SIGNIFICANT CHANGE UP (ref 43–77)
PLATELET # BLD AUTO: 229 K/UL — SIGNIFICANT CHANGE UP (ref 150–400)
RBC # BLD: 4.1 M/UL — SIGNIFICANT CHANGE UP (ref 3.8–5.2)
RBC # FLD: 11.9 % — SIGNIFICANT CHANGE UP (ref 10.3–14.5)
WBC # BLD: 6.9 K/UL — SIGNIFICANT CHANGE UP (ref 3.8–10.5)
WBC # FLD AUTO: 6.9 K/UL — SIGNIFICANT CHANGE UP (ref 3.8–10.5)

## 2019-12-12 PROCEDURE — 99214 OFFICE O/P EST MOD 30 MIN: CPT

## 2019-12-12 NOTE — PHYSICAL EXAM
[Fully active, able to carry on all pre-disease performance without restriction] : Status 0 - Fully active, able to carry on all pre-disease performance without restriction [de-identified] : congenital deafness- communicates with sign language. Facial rash resolved, hypertrichosis [Normal] : affect appropriate [de-identified] : facial rash resolved, L great toe paronychia also resolved [de-identified] : trace edema

## 2019-12-12 NOTE — ASSESSMENT
[Palliative] : Goals of care discussed with patient: Palliative [FreeTextEntry1] : 68 yo w with recently diagnosed lung ca on afatinib 20mg daily now with significant improvement of previous skin toxicity\par \par Plan:\par -Will continue afatinib dose reduced 20mg daily. Tolerating this dose very well. \par -Continue minocycline twice a  day\par -Repeat imaging prior to next visit in 2 months\par -Skin care and nail care reiterated\par -LE extremity-No DVT- continue Furosemide 20 mg QoD PRN for management of peripheral edema\par BW today\par -OV 1 week following imaging\par \par \par  [Palliative Care Plan] : Patient was apprised of incurable nature of disease.  Hospice and Palliative care options discussed.

## 2019-12-12 NOTE — HISTORY OF PRESENT ILLNESS
[Disease: _____________________] : Disease: [unfilled] [N: ___] : N[unfilled] [T: ___] : T[unfilled] [AJCC Stage: ____] : AJCC Stage: [unfilled] [M: ___] : M[unfilled] [de-identified] : Ms. Mcintyre is a pleasant 69-year-old woman with recently diagnosed lung adeno ca, here for a follow up visit. \par Breif hx- Ms. 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 She was started on Gilotrif 30 mg in 01/2018 which she did not tolerate well due to cutaneous toxicity for which dose gilotrif at reduced dose of 20mg June 14th. She is tolerating this well without return of previous skin toxicity experienced at higher dose. Pt has no new complaints. She reports no rash, nail issues have resolved. She has one loose BM daily. Weight is stable. Take a nap during day time. Reports nocturia which is not new. CT scan of the chest in Jan 2019 and 04/22/19 show stable nodule. CT of the pelvis showed hypodensity in the uterus and prominent cervix. She has since seen her gyn and everything was reportedly normal. \par No new complaints. Some nail changes- had some podiatric procedures and is completing abx, foot swelling. \par \par 8/23/19: Some nasal congestion. No rash but still nail changes. Eating well and gaining weight.\par \par 10/4/19: Pt feeling well. Returns for routine f/u and discussion of imaging findings. She offers no new complaints. She currently has a slight flare in usual great toe paronychia. No fevers. On minocycline\par \par 12/12/19: Pt returns for follow up. She is feeling great. No complaints. Tolerating  afatinib with minimal AE....mild rash well controlled with minocycline and clobestrol shampoo. Reports had recent mammo which was normal.  [de-identified] : pleomophic,

## 2019-12-12 NOTE — REVIEW OF SYSTEMS
[Recent Change In Weight] : ~T no recent weight change [Chest Pain] : no chest pain [Fatigue] : no fatigue [SOB on Exertion] : no shortness of breath during exertion [Cough] : no cough [Shortness Of Breath] : no shortness of breath [de-identified] : as above [Negative] : Allergic/Immunologic

## 2019-12-13 ENCOUNTER — APPOINTMENT (OUTPATIENT)
Dept: OPHTHALMOLOGY | Facility: CLINIC | Age: 69
End: 2019-12-13

## 2020-01-06 ENCOUNTER — APPOINTMENT (OUTPATIENT)
Dept: GASTROENTEROLOGY | Facility: CLINIC | Age: 70
End: 2020-01-06
Payer: MEDICARE

## 2020-01-06 VITALS
OXYGEN SATURATION: 72 % | BODY MASS INDEX: 30.36 KG/M2 | WEIGHT: 165 LBS | HEIGHT: 62 IN | RESPIRATION RATE: 15 BRPM | TEMPERATURE: 98 F | DIASTOLIC BLOOD PRESSURE: 74 MMHG | SYSTOLIC BLOOD PRESSURE: 122 MMHG

## 2020-01-06 DIAGNOSIS — K64.9 UNSPECIFIED HEMORRHOIDS: ICD-10-CM

## 2020-01-06 PROCEDURE — 99204 OFFICE O/P NEW MOD 45 MIN: CPT

## 2020-01-06 NOTE — PHYSICAL EXAM
[General Appearance - Alert] : alert [General Appearance - In No Acute Distress] : in no acute distress [Sclera] : the sclera and conjunctiva were normal [Outer Ear] : the ears and nose were normal in appearance [Auscultation Breath Sounds / Voice Sounds] : lungs were clear to auscultation bilaterally [Heart Sounds Gallop] : no gallops [Heart Rate And Rhythm] : heart rate was normal and rhythm regular [Heart Sounds] : normal S1 and S2 [Edema] : there was no peripheral edema [Heart Sounds Pericardial Friction Rub] : no pericardial rub [Murmurs] : no murmurs [Abdomen Soft] : soft [Bowel Sounds] : normal bowel sounds [Abdomen Tenderness] : non-tender [] : no hepato-splenomegaly [Normal Sphincter Tone] : normal sphincter tone [Abdomen Mass (___ Cm)] : no abdominal mass palpated [No Rectal Mass] : no rectal mass [FreeTextEntry1] : small external hemorrhoids, no anal fissure or skin tag, hard brown stool in vault [External Hemorrhoid] : external hemorrhoids [No Focal Deficits] : no focal deficits [Skin Color & Pigmentation] : normal skin color and pigmentation

## 2020-01-06 NOTE — ASSESSMENT
[FreeTextEntry1] : This is a 69-year-old female with chronic GERD without evidence of esophagitis on upper endoscopy in 2016. She takes TUMS as needed with good response. She has hemorrhoidal pain, itching and bleeding. She is mildly constipated. I recommend avoiding constipation by increasing her fiber intake and water intake. I recommend stool softener such as Colace 100 mg once a day to twice a day depending on response. A prescription for hydrocortisone cream to be applied to her hemorrhoids as needed was sent to the pharmacy. I recommend she calls or return to the office if she continues with hemorrhoidal symptoms despite dietary manipulation and having better bowel movements. At that time she will be referred to a colorectal surgeon. The asked for a name/contact info of a colorectal surgeon at this time.

## 2020-01-06 NOTE — HISTORY OF PRESENT ILLNESS
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 She is accompanied by her  and , Maris. She reports bright red blood per rectum seen on the toilet paper on the toilet bowl accompanied by rectal pain, swelling and itching. This has been ongoing for over a year. She reports 1-2 bowel movements a day sometimes with straining and feeling of incomplete evacuation. She denies abdominal pain, nausea or vomiting. She reports heartburn symptoms occurring approximately once a week. She takes TUMS as needed with good response. She denies dysphagia or odynophagia. She underwent a colonoscopy in 2016 For similar symptoms of rectal bleeding. She was found to have internal hemorrhoids otherwise unremarkable study. She denies family history of colon cancer. She reports family history of stomach cancer in her father. She underwent an upper endoscopy in 2016 showing a normal esophagus and stomach.

## 2020-01-13 ENCOUNTER — NON-APPOINTMENT (OUTPATIENT)
Age: 70
End: 2020-01-13

## 2020-01-13 ENCOUNTER — APPOINTMENT (OUTPATIENT)
Dept: OPHTHALMOLOGY | Facility: CLINIC | Age: 70
End: 2020-01-13
Payer: MEDICARE

## 2020-01-13 PROCEDURE — 92014 COMPRE OPH EXAM EST PT 1/>: CPT

## 2020-02-01 ENCOUNTER — TRANSCRIPTION ENCOUNTER (OUTPATIENT)
Age: 70
End: 2020-02-01

## 2020-02-11 LAB
ALBUMIN SERPL ELPH-MCNC: 3.6 G/DL
ALP BLD-CCNC: 111 U/L
ALT SERPL-CCNC: 29 U/L
ANION GAP SERPL CALC-SCNC: 12 MMOL/L
AST SERPL-CCNC: 26 U/L
BILIRUB SERPL-MCNC: 0.2 MG/DL
BUN SERPL-MCNC: 24 MG/DL
CALCIUM SERPL-MCNC: 8.3 MG/DL
CEA SERPL-MCNC: 2 NG/ML
CHLORIDE SERPL-SCNC: 107 MMOL/L
CO2 SERPL-SCNC: 22 MMOL/L
CREAT SERPL-MCNC: 0.97 MG/DL
GLUCOSE SERPL-MCNC: 99 MG/DL
POTASSIUM SERPL-SCNC: 3.8 MMOL/L
PROT SERPL-MCNC: 6.2 G/DL
SODIUM SERPL-SCNC: 141 MMOL/L

## 2020-02-18 ENCOUNTER — FORM ENCOUNTER (OUTPATIENT)
Age: 70
End: 2020-02-18

## 2020-02-19 ENCOUNTER — APPOINTMENT (OUTPATIENT)
Dept: CT IMAGING | Facility: IMAGING CENTER | Age: 70
End: 2020-02-19
Payer: MEDICARE

## 2020-02-19 ENCOUNTER — OUTPATIENT (OUTPATIENT)
Dept: OUTPATIENT SERVICES | Facility: HOSPITAL | Age: 70
LOS: 1 days | End: 2020-02-19
Payer: MEDICARE

## 2020-02-19 DIAGNOSIS — Z98.49 CATARACT EXTRACTION STATUS, UNSPECIFIED EYE: Chronic | ICD-10-CM

## 2020-02-19 DIAGNOSIS — Z98.89 OTHER SPECIFIED POSTPROCEDURAL STATES: Chronic | ICD-10-CM

## 2020-02-19 DIAGNOSIS — C34.90 MALIGNANT NEOPLASM OF UNSPECIFIED PART OF UNSPECIFIED BRONCHUS OR LUNG: ICD-10-CM

## 2020-02-19 DIAGNOSIS — M23.91 UNSPECIFIED INTERNAL DERANGEMENT OF RIGHT KNEE: Chronic | ICD-10-CM

## 2020-02-19 PROCEDURE — 71260 CT THORAX DX C+: CPT

## 2020-02-19 PROCEDURE — 74160 CT ABDOMEN W/CONTRAST: CPT

## 2020-02-19 PROCEDURE — 71260 CT THORAX DX C+: CPT | Mod: 26

## 2020-02-19 PROCEDURE — 74160 CT ABDOMEN W/CONTRAST: CPT | Mod: 26

## 2020-03-03 ENCOUNTER — APPOINTMENT (OUTPATIENT)
Dept: DERMATOLOGY | Facility: CLINIC | Age: 70
End: 2020-03-03
Payer: MEDICARE

## 2020-03-03 VITALS — BODY MASS INDEX: 29.44 KG/M2 | HEIGHT: 62 IN | WEIGHT: 160 LBS

## 2020-03-03 PROCEDURE — 99214 OFFICE O/P EST MOD 30 MIN: CPT

## 2020-03-16 ENCOUNTER — TRANSCRIPTION ENCOUNTER (OUTPATIENT)
Age: 70
End: 2020-03-16

## 2020-03-17 ENCOUNTER — APPOINTMENT (OUTPATIENT)
Dept: INTERNAL MEDICINE | Facility: CLINIC | Age: 70
End: 2020-03-17

## 2020-03-17 ENCOUNTER — NON-APPOINTMENT (OUTPATIENT)
Age: 70
End: 2020-03-17

## 2020-03-24 ENCOUNTER — OUTPATIENT (OUTPATIENT)
Dept: OUTPATIENT SERVICES | Facility: HOSPITAL | Age: 70
LOS: 1 days | Discharge: HOME | End: 2020-03-24

## 2020-03-24 DIAGNOSIS — Z98.89 OTHER SPECIFIED POSTPROCEDURAL STATES: Chronic | ICD-10-CM

## 2020-03-24 DIAGNOSIS — Z98.49 CATARACT EXTRACTION STATUS, UNSPECIFIED EYE: Chronic | ICD-10-CM

## 2020-03-24 DIAGNOSIS — C34.90 MALIGNANT NEOPLASM OF UNSPECIFIED PART OF UNSPECIFIED BRONCHUS OR LUNG: ICD-10-CM

## 2020-03-24 DIAGNOSIS — M23.91 UNSPECIFIED INTERNAL DERANGEMENT OF RIGHT KNEE: Chronic | ICD-10-CM

## 2020-03-27 ENCOUNTER — APPOINTMENT (OUTPATIENT)
Dept: HEMATOLOGY ONCOLOGY | Facility: CLINIC | Age: 70
End: 2020-03-27
Payer: MEDICARE

## 2020-03-27 ENCOUNTER — APPOINTMENT (OUTPATIENT)
Dept: HEMATOLOGY ONCOLOGY | Facility: CLINIC | Age: 70
End: 2020-03-27

## 2020-03-27 PROCEDURE — 99215 OFFICE O/P EST HI 40 MIN: CPT | Mod: 95

## 2020-03-30 NOTE — HISTORY OF PRESENT ILLNESS
[Home] : at home, [unfilled] , at the time of the visit. [Clinic: ([facility])___] : at the clinic in [unfilled] [Spouse] : spouse [Family Member] : family member [] :  [FreeTextEntry2] : Kamla Mcintyre [FreeTextEntry4] : Genesis Herrera [FreeTextEntry3] : self [TWNoteComboBox1] : American Sign Language [Disease: _____________________] : Disease: [unfilled] [T: ___] : T[unfilled] [N: ___] : N[unfilled] [M: ___] : M[unfilled] [AJCC Stage: ____] : AJCC Stage: [unfilled] [de-identified] : Ms. Mcintyre is a pleasant 69-year-old woman with recently diagnosed lung adeno ca, here for a follow up visit. \par Breif hx- Ms. 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 She was started on Gilotrif 30 mg in 01/2018 which she did not tolerate well due to cutaneous toxicity for which dose gilotrif at reduced dose of 20mg June 14th. She is tolerating this well without return of previous skin toxicity experienced at higher dose. Pt has no new complaints. She reports no rash, nail issues have resolved. She has one loose BM daily. Weight is stable. Take a nap during day time. Reports nocturia which is not new. CT scan of the chest in Jan 2019 and 04/22/19 show stable nodule. CT of the pelvis showed hypodensity in the uterus and prominent cervix. She has since seen her gyn and everything was reportedly normal. \par No new complaints. Some nail changes- had some podiatric procedures and is completing abx, foot swelling. \par \par 8/23/19: Some nasal congestion. No rash but still nail changes. Eating well and gaining weight.\par \par 10/4/19: Pt feeling well. Returns for routine f/u and discussion of imaging findings. She offers no new complaints. She currently has a slight flare in usual great toe paronychia. No fevers. On minocycline\par \par 12/12/19: Pt returns for follow up. She is feeling great. No complaints. Tolerating  afatinib with minimal AE....mild rash well controlled with minocycline and clobestrol shampoo. Reports had recent mammo which was normal. \par \par 2/30/20: Pt c/o c/l pleuritic chest pain. Some cough but nio other complaints. Rash and GI discomfort stable.  [de-identified] : pleomophic,

## 2020-03-30 NOTE — ASSESSMENT
[FreeTextEntry1] : 68 yo w with recently diagnosed lung ca on afatinib 20mg daily with new scans showing progression of disease\par Reviewed scans personally-unequivocal progression\par Discussed with pt and her family\par Ideally, would have discussed biopsy but given current circumstances, will start next line tx which would be osimertinib 80 mg daily while contunuing with work up\par Will get PET/CT to assess for additional sites of disease and unexplained chest pain\par Labs next visit indcluding Guardant liquid bx in 2 weeks\par Discussed new medication in detail including potential AEs. \par Pt advised to continue Afatinib till the day she received TAgrisso\par Continue minocycline twice a  day for now\par Skin care and nail care reiterated\par EKG next visit\par OV in 2 weeks.  [Palliative] : Goals of care discussed with patient: Palliative [Palliative Care Plan] : Patient was apprised of incurable nature of disease.  Hospice and Palliative care options discussed.

## 2020-03-30 NOTE — PHYSICAL EXAM
[Fully active, able to carry on all pre-disease performance without restriction] : Status 0 - Fully active, able to carry on all pre-disease performance without restriction [Normal] : affect appropriate [de-identified] : congenital deafness- communicates with sign language. Some facial rash/skin thickening, hypertrichosis [de-identified] : Breathing comfortably [de-identified] : facial rash resolved, L great toe paronychia also resolved

## 2020-03-30 NOTE — REVIEW OF SYSTEMS
[Fatigue] : no fatigue [Recent Change In Weight] : ~T no recent weight change [Chest Pain] : no chest pain [Shortness Of Breath] : no shortness of breath [Cough] : cough [SOB on Exertion] : no shortness of breath during exertion [Negative] : Allergic/Immunologic [FreeTextEntry9] : as above [de-identified] : as above

## 2020-04-10 ENCOUNTER — APPOINTMENT (OUTPATIENT)
Dept: HEMATOLOGY ONCOLOGY | Facility: CLINIC | Age: 70
End: 2020-04-10

## 2020-04-10 ENCOUNTER — RX RENEWAL (OUTPATIENT)
Age: 70
End: 2020-04-10

## 2020-04-10 ENCOUNTER — RESULT REVIEW (OUTPATIENT)
Age: 70
End: 2020-04-10

## 2020-04-10 ENCOUNTER — APPOINTMENT (OUTPATIENT)
Dept: NUCLEAR MEDICINE | Facility: IMAGING CENTER | Age: 70
End: 2020-04-10
Payer: MEDICARE

## 2020-04-10 ENCOUNTER — APPOINTMENT (OUTPATIENT)
Dept: MRI IMAGING | Facility: IMAGING CENTER | Age: 70
End: 2020-04-10
Payer: MEDICARE

## 2020-04-10 ENCOUNTER — OUTPATIENT (OUTPATIENT)
Dept: OUTPATIENT SERVICES | Facility: HOSPITAL | Age: 70
LOS: 1 days | End: 2020-04-10
Payer: MEDICARE

## 2020-04-10 DIAGNOSIS — Z98.89 OTHER SPECIFIED POSTPROCEDURAL STATES: Chronic | ICD-10-CM

## 2020-04-10 DIAGNOSIS — M23.91 UNSPECIFIED INTERNAL DERANGEMENT OF RIGHT KNEE: Chronic | ICD-10-CM

## 2020-04-10 DIAGNOSIS — C34.90 MALIGNANT NEOPLASM OF UNSPECIFIED PART OF UNSPECIFIED BRONCHUS OR LUNG: ICD-10-CM

## 2020-04-10 DIAGNOSIS — Z98.49 CATARACT EXTRACTION STATUS, UNSPECIFIED EYE: Chronic | ICD-10-CM

## 2020-04-10 LAB
BASOPHILS # BLD AUTO: 0.04 K/UL — SIGNIFICANT CHANGE UP (ref 0–0.2)
BASOPHILS NFR BLD AUTO: 0.7 % — SIGNIFICANT CHANGE UP (ref 0–2)
EOSINOPHIL # BLD AUTO: 0.09 K/UL — SIGNIFICANT CHANGE UP (ref 0–0.5)
EOSINOPHIL NFR BLD AUTO: 1.6 % — SIGNIFICANT CHANGE UP (ref 0–6)
HCT VFR BLD CALC: 41.2 % — SIGNIFICANT CHANGE UP (ref 34.5–45)
HGB BLD-MCNC: 13.3 G/DL — SIGNIFICANT CHANGE UP (ref 11.5–15.5)
IMM GRANULOCYTES NFR BLD AUTO: 0.4 % — SIGNIFICANT CHANGE UP (ref 0–1.5)
LYMPHOCYTES # BLD AUTO: 1.61 K/UL — SIGNIFICANT CHANGE UP (ref 1–3.3)
LYMPHOCYTES # BLD AUTO: 28.2 % — SIGNIFICANT CHANGE UP (ref 13–44)
MCHC RBC-ENTMCNC: 30.6 PG — SIGNIFICANT CHANGE UP (ref 27–34)
MCHC RBC-ENTMCNC: 32.3 GM/DL — SIGNIFICANT CHANGE UP (ref 32–36)
MCV RBC AUTO: 94.7 FL — SIGNIFICANT CHANGE UP (ref 80–100)
MONOCYTES # BLD AUTO: 0.54 K/UL — SIGNIFICANT CHANGE UP (ref 0–0.9)
MONOCYTES NFR BLD AUTO: 9.5 % — SIGNIFICANT CHANGE UP (ref 2–14)
NEUTROPHILS # BLD AUTO: 3.41 K/UL — SIGNIFICANT CHANGE UP (ref 1.8–7.4)
NEUTROPHILS NFR BLD AUTO: 59.6 % — SIGNIFICANT CHANGE UP (ref 43–77)
NRBC # BLD: 0 /100 WBCS — SIGNIFICANT CHANGE UP (ref 0–0)
PLATELET # BLD AUTO: 211 K/UL — SIGNIFICANT CHANGE UP (ref 150–400)
RBC # BLD: 4.35 M/UL — SIGNIFICANT CHANGE UP (ref 3.8–5.2)
RBC # FLD: 13.5 % — SIGNIFICANT CHANGE UP (ref 10.3–14.5)
WBC # BLD: 5.71 K/UL — SIGNIFICANT CHANGE UP (ref 3.8–10.5)
WBC # FLD AUTO: 5.71 K/UL — SIGNIFICANT CHANGE UP (ref 3.8–10.5)

## 2020-04-10 PROCEDURE — 78815 PET IMAGE W/CT SKULL-THIGH: CPT

## 2020-04-10 PROCEDURE — A9552: CPT

## 2020-04-10 PROCEDURE — A9585: CPT

## 2020-04-10 PROCEDURE — 70553 MRI BRAIN STEM W/O & W/DYE: CPT

## 2020-04-10 PROCEDURE — 70553 MRI BRAIN STEM W/O & W/DYE: CPT | Mod: 26

## 2020-04-10 PROCEDURE — 78815 PET IMAGE W/CT SKULL-THIGH: CPT | Mod: 26,PS

## 2020-04-21 LAB
ALBUMIN SERPL ELPH-MCNC: 3.8 G/DL
ALP BLD-CCNC: 111 U/L
ALT SERPL-CCNC: 24 U/L
ANION GAP SERPL CALC-SCNC: 14 MMOL/L
AST SERPL-CCNC: 26 U/L
BILIRUB SERPL-MCNC: 0.4 MG/DL
BUN SERPL-MCNC: 35 MG/DL
CALCIUM SERPL-MCNC: 8.8 MG/DL
CEA SERPL-MCNC: 5.3 NG/ML
CHLORIDE SERPL-SCNC: 106 MMOL/L
CO2 SERPL-SCNC: 20 MMOL/L
CREAT SERPL-MCNC: 1.21 MG/DL
GLUCOSE SERPL-MCNC: 92 MG/DL
MAGNESIUM SERPL-MCNC: 2.1 MG/DL
POTASSIUM SERPL-SCNC: 5 MMOL/L
PROT SERPL-MCNC: 6.6 G/DL
SODIUM SERPL-SCNC: 140 MMOL/L
TSH SERPL-ACNC: 1.92 UIU/ML

## 2020-05-07 ENCOUNTER — OUTPATIENT (OUTPATIENT)
Dept: OUTPATIENT SERVICES | Facility: HOSPITAL | Age: 70
LOS: 1 days | Discharge: HOME | End: 2020-05-07

## 2020-05-07 DIAGNOSIS — Z98.89 OTHER SPECIFIED POSTPROCEDURAL STATES: Chronic | ICD-10-CM

## 2020-05-07 DIAGNOSIS — M23.91 UNSPECIFIED INTERNAL DERANGEMENT OF RIGHT KNEE: Chronic | ICD-10-CM

## 2020-05-07 DIAGNOSIS — Z98.49 CATARACT EXTRACTION STATUS, UNSPECIFIED EYE: Chronic | ICD-10-CM

## 2020-05-07 DIAGNOSIS — C34.90 MALIGNANT NEOPLASM OF UNSPECIFIED PART OF UNSPECIFIED BRONCHUS OR LUNG: ICD-10-CM

## 2020-05-08 ENCOUNTER — APPOINTMENT (OUTPATIENT)
Dept: HEMATOLOGY ONCOLOGY | Facility: CLINIC | Age: 70
End: 2020-05-08
Payer: MEDICARE

## 2020-05-08 PROCEDURE — 99215 OFFICE O/P EST HI 40 MIN: CPT | Mod: 95

## 2020-05-08 NOTE — ASSESSMENT
[Palliative] : Goals of care discussed with patient: Palliative [Palliative Care Plan] : Patient was apprised of incurable nature of disease.  Hospice and Palliative care options discussed. [FreeTextEntry1] : 69 yo w with recently diagnosed lung ca on afatinib 20mg daily with new scans showing progression of disease\par I reviewed recent PET/CT and brain MRI\par There was FDG-uptake in all areas noted on the scan. MRI shows no mets\par Discussed with pt and her family regarding unequivocal progression. \par No easy site to biopsy. Peripheral blood NGS did not reveal any EGFR mut. \par She started Osimertinib 80 mg daily beginning of April and seems to be doing well\par Will get EKG and echo\par Will repeat scans in 2 months\par Labs in 2 weeks\par Skin care and nail care reiterated\par OV in 2 months after scans

## 2020-05-08 NOTE — PHYSICAL EXAM
[Fully active, able to carry on all pre-disease performance without restriction] : Status 0 - Fully active, able to carry on all pre-disease performance without restriction [Normal] : affect appropriate [de-identified] : congenital deafness- communicates with sign language. Some facial rash/skin thickening, hypertrichosis [de-identified] : neck fullness, facial edema [de-identified] : Breathing comfortably [de-identified] : facial rash resolved, L great toe paronychia also resolved, no skin rash

## 2020-05-08 NOTE — REVIEW OF SYSTEMS
[Cough] : cough [Fatigue] : fatigue [Recent Change In Weight] : ~T recent weight change [Chest Pain] : no chest pain [Shortness Of Breath] : no shortness of breath [SOB on Exertion] : no shortness of breath during exertion [Negative] : Eyes [FreeTextEntry4] : facial swelling  [FreeTextEntry6] : improved [de-identified] : as above [FreeTextEntry9] : as above

## 2020-05-08 NOTE — HISTORY OF PRESENT ILLNESS
[Disease: _____________________] : Disease: [unfilled] [M: ___] : M[unfilled] [N: ___] : N[unfilled] [T: ___] : T[unfilled] [AJCC Stage: ____] : AJCC Stage: [unfilled] [Home] : at home, [unfilled] , at the time of the visit. [Family Member] : family member [Spouse] : spouse [Clinic: ([facility])___] : at the clinic in [unfilled] [] :  [de-identified] : Ms. Mcintyre is a pleasant 69-year-old woman with recently diagnosed lung adeno ca, here for a follow up visit. \par Breif hx- Ms. 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 She was started on Gilotrif 30 mg in 01/2018 which she did not tolerate well due to cutaneous toxicity for which dose gilotrif at reduced dose of 20mg June 14th. She is tolerating this well without return of previous skin toxicity experienced at higher dose. Pt has no new complaints. She reports no rash, nail issues have resolved. She has one loose BM daily. Weight is stable. Take a nap during day time. Reports nocturia which is not new. CT scan of the chest in Jan 2019 and 04/22/19 show stable nodule. CT of the pelvis showed hypodensity in the uterus and prominent cervix. She has since seen her gyn and everything was reportedly normal. \par No new complaints. Some nail changes- had some podiatric procedures and is completing abx, foot swelling. \par \par 8/23/19: Some nasal congestion. No rash but still nail changes. Eating well and gaining weight.\par \par 10/4/19: Pt feeling well. Returns for routine f/u and discussion of imaging findings. She offers no new complaints. She currently has a slight flare in usual great toe paronychia. No fevers. On minocycline\par \par 12/12/19: Pt returns for follow up. She is feeling great. No complaints. Tolerating  afatinib with minimal AE....mild rash well controlled with minocycline and clobestrol shampoo. Reports had recent mammo which was normal. \par \par 2/30/20: Pt c/o c/l pleuritic chest pain. Some cough but nio other complaints. Rash and GI discomfort stable. \par \par 5/8/20: Pt has gained weight. Neck is swollen and so are her ankles. Appetite is much improved. Cough has improved. She has no chest pain but she has been taking Tylenol round the clock in anticipation of pain. 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  [de-identified] : pleomophic,  [FreeTextEntry3] : self [FreeTextEntry2] : Genesis Herrera [FreeTextEntry4] : Genesis Herrera [TWNoteComboBox1] : American Sign Language

## 2020-05-13 ENCOUNTER — APPOINTMENT (OUTPATIENT)
Dept: INTERNAL MEDICINE | Facility: CLINIC | Age: 70
End: 2020-05-13

## 2020-05-14 ENCOUNTER — APPOINTMENT (OUTPATIENT)
Dept: INTERNAL MEDICINE | Facility: CLINIC | Age: 70
End: 2020-05-14
Payer: MEDICARE

## 2020-05-14 ENCOUNTER — APPOINTMENT (OUTPATIENT)
Dept: CARDIOLOGY | Facility: CLINIC | Age: 70
End: 2020-05-14

## 2020-05-14 PROCEDURE — 99213 OFFICE O/P EST LOW 20 MIN: CPT | Mod: 95

## 2020-05-14 NOTE — PHYSICAL EXAM
[No Acute Distress] : no acute distress [Well-Appearing] : well-appearing [Normal Sclera/Conjunctiva] : normal sclera/conjunctiva [EOMI] : extraocular movements intact [No Respiratory Distress] : no respiratory distress  [No Accessory Muscle Use] : no accessory muscle use [Grossly Normal Strength/Tone] : grossly normal strength/tone [No Rash] : no rash [Coordination Grossly Intact] : coordination grossly intact [No Focal Deficits] : no focal deficits [Normal Affect] : the affect was normal

## 2020-05-14 NOTE — HISTORY OF PRESENT ILLNESS
[Home] : at home, [unfilled] , at the time of the visit. [Medical Office: ([facility])___] : at the medical office located in  [Patient] : the patient [ Service] : provided by  Service [TWNoteComboBox1] : American Sign Language [de-identified] : Visit scheduled by patient to address her ongoing medical issues. An in-person visit is not advisable due to the current stay-at-home directive across [facility] during the COVID-19 State of Emergency, so TeleHealth visit was initiated and is appropriate to care for this patient. \par \par She and  are currently staying in the home of their daughter an son-in-law [facility]. Have been there the past 2 months since the COVID pandemic began to really flare. \par She has been feeling well - only complaint - she developed some left sided breast/chest pain couple of months ago.  She spoke with her oncologist - started a new medication Tagrisso and since starting it the pain has improved significantly.  Her oncologist did also want her to see a cardiologist and she has an appt. scheduled for tomorrow - will be getting an EKG and lab work along with an evaluation. \par \par Also c/o intermittent LE edema - not currently present but has been occurring on and off. When queried, reports the swelling is gone in the morning, becomes more noticeable as the day progresses.  Gets better when she elevated her legs as well.

## 2020-05-14 NOTE — ASSESSMENT
[FreeTextEntry1] : Staying with daughter in her home [facility].  Overall feels well - was having some right sided chest pain but started new medication by her oncologist and that has resolved. \par Going to cardiologist tomorrow for BP check and EKG.  will have note faxed here along with any blood work done.\par has all meds up there with her. \par Continue current care.

## 2020-07-08 ENCOUNTER — RESULT REVIEW (OUTPATIENT)
Age: 70
End: 2020-07-08

## 2020-07-08 ENCOUNTER — APPOINTMENT (OUTPATIENT)
Dept: CT IMAGING | Facility: IMAGING CENTER | Age: 70
End: 2020-07-08
Payer: MEDICARE

## 2020-07-08 ENCOUNTER — OUTPATIENT (OUTPATIENT)
Dept: OUTPATIENT SERVICES | Facility: HOSPITAL | Age: 70
LOS: 1 days | End: 2020-07-08
Payer: MEDICARE

## 2020-07-08 DIAGNOSIS — R07.81 PLEURODYNIA: ICD-10-CM

## 2020-07-08 DIAGNOSIS — Z98.49 CATARACT EXTRACTION STATUS, UNSPECIFIED EYE: Chronic | ICD-10-CM

## 2020-07-08 DIAGNOSIS — M23.91 UNSPECIFIED INTERNAL DERANGEMENT OF RIGHT KNEE: Chronic | ICD-10-CM

## 2020-07-08 DIAGNOSIS — Z98.89 OTHER SPECIFIED POSTPROCEDURAL STATES: Chronic | ICD-10-CM

## 2020-07-08 PROCEDURE — 74160 CT ABDOMEN W/CONTRAST: CPT

## 2020-07-08 PROCEDURE — 71260 CT THORAX DX C+: CPT

## 2020-07-08 PROCEDURE — 71260 CT THORAX DX C+: CPT | Mod: 26

## 2020-07-08 PROCEDURE — 74160 CT ABDOMEN W/CONTRAST: CPT | Mod: 26

## 2020-07-08 PROCEDURE — 82565 ASSAY OF CREATININE: CPT

## 2020-07-09 ENCOUNTER — OUTPATIENT (OUTPATIENT)
Dept: OUTPATIENT SERVICES | Facility: HOSPITAL | Age: 70
LOS: 1 days | Discharge: HOME | End: 2020-07-09

## 2020-07-09 DIAGNOSIS — Z98.49 CATARACT EXTRACTION STATUS, UNSPECIFIED EYE: Chronic | ICD-10-CM

## 2020-07-09 DIAGNOSIS — Z98.89 OTHER SPECIFIED POSTPROCEDURAL STATES: Chronic | ICD-10-CM

## 2020-07-09 DIAGNOSIS — C34.90 MALIGNANT NEOPLASM OF UNSPECIFIED PART OF UNSPECIFIED BRONCHUS OR LUNG: ICD-10-CM

## 2020-07-09 DIAGNOSIS — M23.91 UNSPECIFIED INTERNAL DERANGEMENT OF RIGHT KNEE: Chronic | ICD-10-CM

## 2020-07-10 ENCOUNTER — APPOINTMENT (OUTPATIENT)
Dept: HEMATOLOGY ONCOLOGY | Facility: CLINIC | Age: 70
End: 2020-07-10
Payer: MEDICARE

## 2020-07-10 PROCEDURE — 99215 OFFICE O/P EST HI 40 MIN: CPT | Mod: 95

## 2020-07-10 NOTE — ASSESSMENT
[Palliative] : Goals of care discussed with patient: Palliative [Palliative Care Plan] : Patient was apprised of incurable nature of disease.  Hospice and Palliative care options discussed. [FreeTextEntry1] : 69 yo w with recently diagnosed lung ca on afatinib 20mg daily with new scans showing progression of disease\par I reviewed recent PET/CT and brain MRI\par There was FDG-uptake in all areas noted on the scan. MRI shows no mets\par Discussed with pt and her family regarding unequivocal progression. \par No easy site to biopsy. Peripheral blood NGS did not reveal any EGFR mut. \par She started Osimertinib 80 mg daily beginning of April and seems to be doing well\par Scans from July 2020 shows DC, some new findings likely inflammatory\par Labs at home\par Nothing obvious on last scan to explain severe fatigue\par Uses topical applications for hemorrhoids. Follow up with PCP\par Skin care and nail care reiterated\par Repeat scans in 3 months\par Tele-med after that\par OV in 2 months after scans

## 2020-07-10 NOTE — REVIEW OF SYSTEMS
[Fatigue] : fatigue [Recent Change In Weight] : ~T recent weight change [Cough] : cough [Negative] : Heme/Lymph [Chest Pain] : no chest pain [SOB on Exertion] : no shortness of breath during exertion [Shortness Of Breath] : no shortness of breath [FreeTextEntry4] : facial swelling  [FreeTextEntry6] : improved [FreeTextEntry9] : as above [de-identified] : as above

## 2020-07-17 ENCOUNTER — LABORATORY RESULT (OUTPATIENT)
Age: 70
End: 2020-07-17

## 2020-07-31 RX ORDER — AFATINIB 20 MG/1
20 TABLET, FILM COATED ORAL DAILY
Qty: 30 | Refills: 5 | Status: DISCONTINUED | COMMUNITY
Start: 2017-12-29 | End: 2020-07-31

## 2020-08-04 ENCOUNTER — RESULT REVIEW (OUTPATIENT)
Age: 70
End: 2020-08-04

## 2020-08-04 ENCOUNTER — APPOINTMENT (OUTPATIENT)
Dept: HEMATOLOGY ONCOLOGY | Facility: CLINIC | Age: 70
End: 2020-08-04
Payer: MEDICARE

## 2020-08-04 ENCOUNTER — OUTPATIENT (OUTPATIENT)
Dept: OUTPATIENT SERVICES | Facility: HOSPITAL | Age: 70
LOS: 1 days | End: 2020-08-04
Payer: MEDICARE

## 2020-08-04 VITALS
SYSTOLIC BLOOD PRESSURE: 105 MMHG | DIASTOLIC BLOOD PRESSURE: 71 MMHG | OXYGEN SATURATION: 98 % | TEMPERATURE: 99.2 F | BODY MASS INDEX: 27.9 KG/M2 | RESPIRATION RATE: 16 BRPM | HEART RATE: 93 BPM | WEIGHT: 152.56 LBS

## 2020-08-04 DIAGNOSIS — Z98.49 CATARACT EXTRACTION STATUS, UNSPECIFIED EYE: Chronic | ICD-10-CM

## 2020-08-04 DIAGNOSIS — Z98.89 OTHER SPECIFIED POSTPROCEDURAL STATES: Chronic | ICD-10-CM

## 2020-08-04 DIAGNOSIS — L70.8 OTHER ACNE: ICD-10-CM

## 2020-08-04 DIAGNOSIS — M23.91 UNSPECIFIED INTERNAL DERANGEMENT OF RIGHT KNEE: Chronic | ICD-10-CM

## 2020-08-04 LAB
BASOPHILS # BLD AUTO: 0.02 K/UL — SIGNIFICANT CHANGE UP (ref 0–0.2)
BASOPHILS NFR BLD AUTO: 0.2 % — SIGNIFICANT CHANGE UP (ref 0–2)
BLD GP AB SCN SERPL QL: NEGATIVE — SIGNIFICANT CHANGE UP
EOSINOPHIL # BLD AUTO: 0.03 K/UL — SIGNIFICANT CHANGE UP (ref 0–0.5)
EOSINOPHIL NFR BLD AUTO: 0.3 % — SIGNIFICANT CHANGE UP (ref 0–6)
HCT VFR BLD CALC: 34.3 % — LOW (ref 34.5–45)
HGB BLD-MCNC: 10.9 G/DL — LOW (ref 11.5–15.5)
IMM GRANULOCYTES NFR BLD AUTO: 0.6 % — SIGNIFICANT CHANGE UP (ref 0–1.5)
LYMPHOCYTES # BLD AUTO: 1.25 K/UL — SIGNIFICANT CHANGE UP (ref 1–3.3)
LYMPHOCYTES # BLD AUTO: 14.6 % — SIGNIFICANT CHANGE UP (ref 13–44)
MCHC RBC-ENTMCNC: 31.5 PG — SIGNIFICANT CHANGE UP (ref 27–34)
MCHC RBC-ENTMCNC: 31.8 GM/DL — LOW (ref 32–36)
MCV RBC AUTO: 99.1 FL — SIGNIFICANT CHANGE UP (ref 80–100)
MONOCYTES # BLD AUTO: 0.74 K/UL — SIGNIFICANT CHANGE UP (ref 0–0.9)
MONOCYTES NFR BLD AUTO: 8.6 % — SIGNIFICANT CHANGE UP (ref 2–14)
NEUTROPHILS # BLD AUTO: 6.49 K/UL — SIGNIFICANT CHANGE UP (ref 1.8–7.4)
NEUTROPHILS NFR BLD AUTO: 75.7 % — SIGNIFICANT CHANGE UP (ref 43–77)
NRBC # BLD: 0 /100 WBCS — SIGNIFICANT CHANGE UP (ref 0–0)
PLATELET # BLD AUTO: 231 K/UL — SIGNIFICANT CHANGE UP (ref 150–400)
RBC # BLD: 3.46 M/UL — LOW (ref 3.8–5.2)
RBC # FLD: 13.7 % — SIGNIFICANT CHANGE UP (ref 10.3–14.5)
RH IG SCN BLD-IMP: POSITIVE — SIGNIFICANT CHANGE UP
RH IG SCN BLD-IMP: POSITIVE — SIGNIFICANT CHANGE UP
WBC # BLD: 8.58 K/UL — SIGNIFICANT CHANGE UP (ref 3.8–10.5)
WBC # FLD AUTO: 8.58 K/UL — SIGNIFICANT CHANGE UP (ref 3.8–10.5)

## 2020-08-04 PROCEDURE — 86850 RBC ANTIBODY SCREEN: CPT

## 2020-08-04 PROCEDURE — 86900 BLOOD TYPING SEROLOGIC ABO: CPT

## 2020-08-04 PROCEDURE — 86901 BLOOD TYPING SEROLOGIC RH(D): CPT

## 2020-08-04 PROCEDURE — 99214 OFFICE O/P EST MOD 30 MIN: CPT

## 2020-08-04 NOTE — ASSESSMENT
[Palliative] : Goals of care discussed with patient: Palliative [Palliative Care Plan] : Patient was apprised of incurable nature of disease.  Hospice and Palliative care options discussed. [FreeTextEntry1] : 69 yo w with recently diagnosed lung ca on afatinib 20mg daily with new scans showing progression of disease\par I reviewed recent PET/CT and brain MRI\par There was FDG-uptake in all areas noted on the scan. MRI shows no mets\par Discussed with pt and her family regarding unequivocal progression. \par No easy site to biopsy. Peripheral blood NGS did not reveal any EGFR mut. \par She started Osimertinib 80 mg daily beginning of April nut has severe fatigue. Will lower to 40 mg daily. \par Scans from July 2020 shows AR, some new findings likely inflammatory\par Labs show normalization of CEA\par Some inflammation noted on last scan, some blood tinged sputum- will try abx- amox\par Uses topical applications for hemorrhoids. Follow up with PCP\par Skin care and nail care reiterated\par Repeat scans in 3 months\par Tele-med after that\par OV in 2 months after scans

## 2020-08-04 NOTE — PHYSICAL EXAM
[Normal] : grossly intact [Restricted in physically strenuous activity but ambulatory and able to carry out work of a light or sedentary nature] : Status 1- Restricted in physically strenuous activity but ambulatory and able to carry out work of a light or sedentary nature, e.g., light house work, office work [de-identified] : congenital deafness- communicates with sign language. Some facial rash/skin thickening, hypertrichosis [de-identified] : neck fullness, facial edema [de-identified] : Breathing comfortably [de-identified] : facial rash resolved, L great toe paronychia also resolved, no skin rash

## 2020-08-04 NOTE — HISTORY OF PRESENT ILLNESS
[T: ___] : T[unfilled] [Disease: _____________________] : Disease: [unfilled] [N: ___] : N[unfilled] [M: ___] : M[unfilled] [AJCC Stage: ____] : AJCC Stage: [unfilled] [de-identified] : Ms. Mcintyre is a pleasant 69-year-old woman with recently diagnosed lung adeno ca, here for a follow up visit. \par Breif hx- Ms. 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 She was started on Gilotrif 30 mg in 01/2018 which she did not tolerate well due to cutaneous toxicity for which dose gilotrif at reduced dose of 20mg June 14th. She is tolerating this well without return of previous skin toxicity experienced at higher dose. Pt has no new complaints. She reports no rash, nail issues have resolved. She has one loose BM daily. Weight is stable. Take a nap during day time. Reports nocturia which is not new. CT scan of the chest in Jan 2019 and 04/22/19 show stable nodule. CT of the pelvis showed hypodensity in the uterus and prominent cervix. She has since seen her gyn and everything was reportedly normal. \par No new complaints. Some nail changes- had some podiatric procedures and is completing abx, foot swelling. \par \par 8/23/19: Some nasal congestion. No rash but still nail changes. Eating well and gaining weight.\par \par 10/4/19: Pt feeling well. Returns for routine f/u and discussion of imaging findings. She offers no new complaints. She currently has a slight flare in usual great toe paronychia. No fevers. On minocycline\par \par 12/12/19: Pt returns for follow up. She is feeling great. No complaints. Tolerating  afatinib with minimal AE....mild rash well controlled with minocycline and clobestrol shampoo. Reports had recent mammo which was normal. \par \par 2/30/20: Pt c/o c/l pleuritic chest pain. Some cough but nio other complaints. Rash and GI discomfort stable. \par \par 5/8/20: Pt has gained weight. Neck is swollen and so are her ankles. Appetite is much improved. Cough has improved. She has no chest pain but she has been taking Tylenol round the clock in anticipation of pain. 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 \par \par 7/10/20: Fatigue and sleeping a lot. Tolerating medication. Has heart burn and takes OTC meds for that. Has some foot pain and is seeing a podiatrist tomorrow. Has one nail on left foot that is swollen and red. Interval decrease in size of right middle and lower lobe nodules since 2/19/2020. New indeterminate 0.5 cm nodular opacities in the left upper and lower lobes as described above. \par \par 8/4/20: Fatigue, nail and skin changes improved. Appetite decreased. Lost almost 5 kg since last visit here in the office.  [de-identified] : pleomophic,

## 2020-08-04 NOTE — REVIEW OF SYSTEMS
[Fatigue] : fatigue [Recent Change In Weight] : ~T recent weight change [Cough] : cough [Negative] : Integumentary [Chest Pain] : no chest pain [Shortness Of Breath] : no shortness of breath [SOB on Exertion] : no shortness of breath during exertion [FreeTextEntry6] : improved, occasional blood-tinged sputum [FreeTextEntry9] : as above [de-identified] : as above

## 2020-08-05 LAB
ALBUMIN SERPL ELPH-MCNC: 3.1 G/DL
ALP BLD-CCNC: 139 U/L
ALT SERPL-CCNC: 21 U/L
ANION GAP SERPL CALC-SCNC: 13 MMOL/L
AST SERPL-CCNC: 33 U/L
BILIRUB SERPL-MCNC: 0.4 MG/DL
BUN SERPL-MCNC: 29 MG/DL
CALCIUM SERPL-MCNC: 9.7 MG/DL
CHLORIDE SERPL-SCNC: 102 MMOL/L
CO2 SERPL-SCNC: 22 MMOL/L
CREAT SERPL-MCNC: 1.13 MG/DL
FERRITIN SERPL-MCNC: 623 NG/ML
GLUCOSE SERPL-MCNC: 91 MG/DL
IRON SATN MFR SERPL: 13 %
IRON SERPL-MCNC: 28 UG/DL
MAGNESIUM SERPL-MCNC: 1.9 MG/DL
POTASSIUM SERPL-SCNC: 5.3 MMOL/L
PROT SERPL-MCNC: 6.4 G/DL
SODIUM SERPL-SCNC: 138 MMOL/L
TIBC SERPL-MCNC: 206 UG/DL
TSH SERPL-ACNC: 1.43 UIU/ML
UIBC SERPL-MCNC: 178 UG/DL

## 2020-08-17 ENCOUNTER — TRANSCRIPTION ENCOUNTER (OUTPATIENT)
Age: 70
End: 2020-08-17

## 2020-08-29 ENCOUNTER — OUTPATIENT (OUTPATIENT)
Dept: OUTPATIENT SERVICES | Facility: HOSPITAL | Age: 70
LOS: 1 days | Discharge: HOME | End: 2020-08-29

## 2020-08-29 DIAGNOSIS — Z98.49 CATARACT EXTRACTION STATUS, UNSPECIFIED EYE: Chronic | ICD-10-CM

## 2020-08-29 DIAGNOSIS — Z98.89 OTHER SPECIFIED POSTPROCEDURAL STATES: Chronic | ICD-10-CM

## 2020-08-29 DIAGNOSIS — M23.91 UNSPECIFIED INTERNAL DERANGEMENT OF RIGHT KNEE: Chronic | ICD-10-CM

## 2020-08-29 DIAGNOSIS — C34.90 MALIGNANT NEOPLASM OF UNSPECIFIED PART OF UNSPECIFIED BRONCHUS OR LUNG: ICD-10-CM

## 2020-09-04 ENCOUNTER — APPOINTMENT (OUTPATIENT)
Dept: HEMATOLOGY ONCOLOGY | Facility: CLINIC | Age: 70
End: 2020-09-04
Payer: MEDICARE

## 2020-09-04 PROCEDURE — 99443: CPT

## 2020-09-04 NOTE — REVIEW OF SYSTEMS
[Cough] : cough [Negative] : Allergic/Immunologic [Recent Change In Weight] : ~T no recent weight change [Fatigue] : no fatigue [Chest Pain] : no chest pain [Shortness Of Breath] : no shortness of breath [SOB on Exertion] : no shortness of breath during exertion [FreeTextEntry6] : much improved

## 2020-09-04 NOTE — PHYSICAL EXAM
[Restricted in physically strenuous activity but ambulatory and able to carry out work of a light or sedentary nature] : Status 1- Restricted in physically strenuous activity but ambulatory and able to carry out work of a light or sedentary nature, e.g., light house work, office work [Normal] : PERRL, EOMI, no conjunctival infection, anicteric

## 2020-09-04 NOTE — HISTORY OF PRESENT ILLNESS
[Home] : at home, [unfilled] , at the time of the visit. [Medical Office: ([facility])___] : at the medical office located in  [Verbal consent obtained from patient] : the patient, [unfilled] [Disease: _____________________] : Disease: [unfilled] [T: ___] : T[unfilled] [N: ___] : N[unfilled] [M: ___] : M[unfilled] [AJCC Stage: ____] : AJCC Stage: [unfilled] [FreeTextEntry4] : Che Mcgraw [de-identified] : Ms. Mcintyre is a pleasant 70-year-old woman with recently diagnosed lung adeno ca, here for a follow up visit. \par Breif hx- Ms. 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 She was started on Gilotrif 30 mg in 01/2018 which she did not tolerate well due to cutaneous toxicity for which dose gilotrif at reduced dose of 20mg June 14th. She is tolerating this well without return of previous skin toxicity experienced at higher dose. Pt has no new complaints. She reports no rash, nail issues have resolved. She has one loose BM daily. Weight is stable. Take a nap during day time. Reports nocturia which is not new. CT scan of the chest in Jan 2019 and 04/22/19 show stable nodule. CT of the pelvis showed hypodensity in the uterus and prominent cervix. She has since seen her gyn and everything was reportedly normal. \par No new complaints. Some nail changes- had some podiatric procedures and is completing abx, foot swelling. \par \par 8/23/19: Some nasal congestion. No rash but still nail changes. Eating well and gaining weight.\par \par 10/4/19: Pt feeling well. Returns for routine f/u and discussion of imaging findings. She offers no new complaints. She currently has a slight flare in usual great toe paronychia. No fevers. On minocycline\par \par 12/12/19: Pt returns for follow up. She is feeling great. No complaints. Tolerating  afatinib with minimal AE....mild rash well controlled with minocycline and clobestrol shampoo. Reports had recent mammo which was normal. \par \par 2/30/20: Pt c/o c/l pleuritic chest pain. Some cough but nio other complaints. Rash and GI discomfort stable. \par \par 5/8/20: Pt has gained weight. Neck is swollen and so are her ankles. Appetite is much improved. Cough has improved. She has no chest pain but she has been taking Tylenol round the clock in anticipation of pain. 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 \par \par 7/10/20: Fatigue and sleeping a lot. Tolerating medication. Has heart burn and takes OTC meds for that. Has some foot pain and is seeing a podiatrist tomorrow. Has one nail on left foot that is swollen and red. Interval decrease in size of right middle and lower lobe nodules since 2/19/2020. New indeterminate 0.5 cm nodular opacities in the left upper and lower lobes as described above. \par \par 8/4/20: Fatigue, nail and skin changes improved. Appetite decreased. Lost almost 5 kg since last visit here in the office. \par \par 9/4/20: Fatigue, anorexia has improved. Thinks she has gained weight.  [de-identified] : pleomophic,

## 2020-09-04 NOTE — ASSESSMENT
[Palliative] : Goals of care discussed with patient: Palliative [Palliative Care Plan] : Patient was apprised of incurable nature of disease.  Hospice and Palliative care options discussed. [FreeTextEntry1] : 69 yo w with recently diagnosed lung ca on afatinib 20mg daily with new scans showing progression of disease\par BAsed on recent PET/CT and brain MRI, there was FDG-uptake in all areas noted on the scan convincing for POD. MRI shows no mets\par Peripheral blood NGS did not reveal any EGFR mut. \par She started Osimertinib 80 mg daily beginning of April at 80 mg daily which she had a hard time handling but is tolerating 40 mg well with no AEs. \par Scans from July 2020 shows [place], some new findings likely inflammatory\par Labs show normalization of CEA\par Will repeat scans in October\par OV after scans\par Labs next visit

## 2020-09-24 ENCOUNTER — TRANSCRIPTION ENCOUNTER (OUTPATIENT)
Age: 70
End: 2020-09-24

## 2020-09-27 ENCOUNTER — RX RENEWAL (OUTPATIENT)
Age: 70
End: 2020-09-27

## 2020-09-28 ENCOUNTER — OUTPATIENT (OUTPATIENT)
Dept: OUTPATIENT SERVICES | Facility: HOSPITAL | Age: 70
LOS: 1 days | Discharge: HOME | End: 2020-09-28

## 2020-09-28 DIAGNOSIS — Z98.89 OTHER SPECIFIED POSTPROCEDURAL STATES: Chronic | ICD-10-CM

## 2020-09-28 DIAGNOSIS — Z98.49 CATARACT EXTRACTION STATUS, UNSPECIFIED EYE: Chronic | ICD-10-CM

## 2020-09-28 DIAGNOSIS — C34.90 MALIGNANT NEOPLASM OF UNSPECIFIED PART OF UNSPECIFIED BRONCHUS OR LUNG: ICD-10-CM

## 2020-09-28 DIAGNOSIS — M23.91 UNSPECIFIED INTERNAL DERANGEMENT OF RIGHT KNEE: Chronic | ICD-10-CM

## 2020-10-01 ENCOUNTER — RESULT REVIEW (OUTPATIENT)
Age: 70
End: 2020-10-01

## 2020-10-01 ENCOUNTER — APPOINTMENT (OUTPATIENT)
Dept: HEMATOLOGY ONCOLOGY | Facility: CLINIC | Age: 70
End: 2020-10-01
Payer: MEDICARE

## 2020-10-01 ENCOUNTER — OUTPATIENT (OUTPATIENT)
Dept: OUTPATIENT SERVICES | Facility: HOSPITAL | Age: 70
LOS: 1 days | End: 2020-10-01
Payer: MEDICARE

## 2020-10-01 ENCOUNTER — APPOINTMENT (OUTPATIENT)
Dept: ULTRASOUND IMAGING | Facility: IMAGING CENTER | Age: 70
End: 2020-10-01
Payer: MEDICARE

## 2020-10-01 ENCOUNTER — APPOINTMENT (OUTPATIENT)
Dept: CT IMAGING | Facility: IMAGING CENTER | Age: 70
End: 2020-10-01
Payer: MEDICARE

## 2020-10-01 DIAGNOSIS — Z98.89 OTHER SPECIFIED POSTPROCEDURAL STATES: Chronic | ICD-10-CM

## 2020-10-01 DIAGNOSIS — C34.90 MALIGNANT NEOPLASM OF UNSPECIFIED PART OF UNSPECIFIED BRONCHUS OR LUNG: ICD-10-CM

## 2020-10-01 DIAGNOSIS — M23.91 UNSPECIFIED INTERNAL DERANGEMENT OF RIGHT KNEE: Chronic | ICD-10-CM

## 2020-10-01 DIAGNOSIS — Z98.49 CATARACT EXTRACTION STATUS, UNSPECIFIED EYE: Chronic | ICD-10-CM

## 2020-10-01 DIAGNOSIS — Z00.8 ENCOUNTER FOR OTHER GENERAL EXAMINATION: ICD-10-CM

## 2020-10-01 LAB
BASOPHILS # BLD AUTO: 0.04 K/UL — SIGNIFICANT CHANGE UP (ref 0–0.2)
BASOPHILS NFR BLD AUTO: 0.6 % — SIGNIFICANT CHANGE UP (ref 0–2)
EOSINOPHIL # BLD AUTO: 0.11 K/UL — SIGNIFICANT CHANGE UP (ref 0–0.5)
EOSINOPHIL NFR BLD AUTO: 1.7 % — SIGNIFICANT CHANGE UP (ref 0–6)
HCT VFR BLD CALC: 35.8 % — SIGNIFICANT CHANGE UP (ref 34.5–45)
HGB BLD-MCNC: 11.5 G/DL — SIGNIFICANT CHANGE UP (ref 11.5–15.5)
IMM GRANULOCYTES NFR BLD AUTO: 0.3 % — SIGNIFICANT CHANGE UP (ref 0–1.5)
LYMPHOCYTES # BLD AUTO: 2.14 K/UL — SIGNIFICANT CHANGE UP (ref 1–3.3)
LYMPHOCYTES # BLD AUTO: 33 % — SIGNIFICANT CHANGE UP (ref 13–44)
MCHC RBC-ENTMCNC: 31.2 PG — SIGNIFICANT CHANGE UP (ref 27–34)
MCHC RBC-ENTMCNC: 32.1 G/DL — SIGNIFICANT CHANGE UP (ref 32–36)
MCV RBC AUTO: 97 FL — SIGNIFICANT CHANGE UP (ref 80–100)
MONOCYTES # BLD AUTO: 0.65 K/UL — SIGNIFICANT CHANGE UP (ref 0–0.9)
MONOCYTES NFR BLD AUTO: 10 % — SIGNIFICANT CHANGE UP (ref 2–14)
NEUTROPHILS # BLD AUTO: 3.53 K/UL — SIGNIFICANT CHANGE UP (ref 1.8–7.4)
NEUTROPHILS NFR BLD AUTO: 54.4 % — SIGNIFICANT CHANGE UP (ref 43–77)
NRBC # BLD: 0 /100 WBCS — SIGNIFICANT CHANGE UP (ref 0–0)
PLATELET # BLD AUTO: 214 K/UL — SIGNIFICANT CHANGE UP (ref 150–400)
RBC # BLD: 3.69 M/UL — LOW (ref 3.8–5.2)
RBC # FLD: 14.6 % — HIGH (ref 10.3–14.5)
WBC # BLD: 6.49 K/UL — SIGNIFICANT CHANGE UP (ref 3.8–10.5)
WBC # FLD AUTO: 6.49 K/UL — SIGNIFICANT CHANGE UP (ref 3.8–10.5)

## 2020-10-01 PROCEDURE — 74160 CT ABDOMEN W/CONTRAST: CPT | Mod: 26

## 2020-10-01 PROCEDURE — 93971 EXTREMITY STUDY: CPT | Mod: 26,LT

## 2020-10-01 PROCEDURE — 71260 CT THORAX DX C+: CPT

## 2020-10-01 PROCEDURE — 86901 BLOOD TYPING SEROLOGIC RH(D): CPT

## 2020-10-01 PROCEDURE — 93971 EXTREMITY STUDY: CPT

## 2020-10-01 PROCEDURE — 71260 CT THORAX DX C+: CPT | Mod: 26

## 2020-10-01 PROCEDURE — 99214 OFFICE O/P EST MOD 30 MIN: CPT

## 2020-10-01 PROCEDURE — 86850 RBC ANTIBODY SCREEN: CPT

## 2020-10-01 PROCEDURE — 82565 ASSAY OF CREATININE: CPT

## 2020-10-01 PROCEDURE — 74160 CT ABDOMEN W/CONTRAST: CPT

## 2020-10-01 PROCEDURE — 86900 BLOOD TYPING SEROLOGIC ABO: CPT

## 2020-10-01 NOTE — REVIEW OF SYSTEMS
[Lower Ext Edema] : lower extremity edema [Cough] : cough [Negative] : Allergic/Immunologic [Fatigue] : no fatigue [Recent Change In Weight] : ~T no recent weight change [Chest Pain] : no chest pain [Shortness Of Breath] : no shortness of breath [SOB on Exertion] : no shortness of breath during exertion [FreeTextEntry5] : NATHANIEL  [FreeTextEntry6] : much improved

## 2020-10-01 NOTE — PHYSICAL EXAM
[Restricted in physically strenuous activity but ambulatory and able to carry out work of a light or sedentary nature] : Status 1- Restricted in physically strenuous activity but ambulatory and able to carry out work of a light or sedentary nature, e.g., light house work, office work [Normal] : affect appropriate [de-identified] : Pale [de-identified] : LLE swelling/ extremity firm/ tender No erythema [de-identified] : see vascular

## 2020-10-01 NOTE — ASSESSMENT
[Palliative] : Goals of care discussed with patient: Palliative [Palliative Care Plan] : Patient was apprised of incurable nature of disease.  Hospice and Palliative care options discussed. [FreeTextEntry1] : 71 yo w with recently diagnosed lung ca on afatinib 20mg daily with new scans showing progression of disease\par BAsed on recent PET/CT and brain MRI, there was FDG-uptake in all areas noted on the scan convincing for POD. MRI shows no mets\par Peripheral blood NGS did not reveal any EGFR mut. \par She started Osimertinib 80 mg daily beginning of April at 80 mg daily which she had a hard time handling but is tolerating 40 mg well with no AEs. \par \par Here today for new onset LLE swelling/ pain\par -Stat doppler LLE\par -BW today\par -Will f/u results

## 2020-10-01 NOTE — HISTORY OF PRESENT ILLNESS
[Disease: _____________________] : Disease: [unfilled] [T: ___] : T[unfilled] [N: ___] : N[unfilled] [M: ___] : M[unfilled] [AJCC Stage: ____] : AJCC Stage: [unfilled] [de-identified] : Ms. Mcintyre is a pleasant 70-year-old woman with recently diagnosed lung adeno ca, here for a follow up visit. \par Breif hx- Ms. 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 She was started on Gilotrif 30 mg in 01/2018 which she did not tolerate well due to cutaneous toxicity for which dose gilotrif at reduced dose of 20mg June 14th. She is tolerating this well without return of previous skin toxicity experienced at higher dose. Pt has no new complaints. She reports no rash, nail issues have resolved. She has one loose BM daily. Weight is stable. Take a nap during day time. Reports nocturia which is not new. CT scan of the chest in Jan 2019 and 04/22/19 show stable nodule. CT of the pelvis showed hypodensity in the uterus and prominent cervix. She has since seen her gyn and everything was reportedly normal. \par No new complaints. Some nail changes- had some podiatric procedures and is completing abx, foot swelling. \par \par 8/23/19: Some nasal congestion. No rash but still nail changes. Eating well and gaining weight.\par \par 10/4/19: Pt feeling well. Returns for routine f/u and discussion of imaging findings. She offers no new complaints. She currently has a slight flare in usual great toe paronychia. No fevers. On minocycline\par \par 12/12/19: Pt returns for follow up. She is feeling great. No complaints. Tolerating  afatinib with minimal AE....mild rash well controlled with minocycline and clobestrol shampoo. Reports had recent mammo which was normal. \par \par 2/30/20: Pt c/o c/l pleuritic chest pain. Some cough but nio other complaints. Rash and GI discomfort stable. \par \par 5/8/20: Pt has gained weight. Neck is swollen and so are her ankles. Appetite is much improved. Cough has improved. She has no chest pain but she has been taking Tylenol round the clock in anticipation of pain. 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 \par \par 7/10/20: Fatigue and sleeping a lot. Tolerating medication. Has heart burn and takes OTC meds for that. Has some foot pain and is seeing a podiatrist tomorrow. Has one nail on left foot that is swollen and red. Interval decrease in size of right middle and lower lobe nodules since 2/19/2020. New indeterminate 0.5 cm nodular opacities in the left upper and lower lobes as described above. \par \par 8/4/20: Fatigue, nail and skin changes improved. Appetite decreased. Lost almost 5 kg since last visit here in the office. \par \par 9/4/20: Fatigue, anorexia has improved. Thinks she has gained weight. \par \par 10/1/2020: Ms. Mcintyre presented at the  after having her ct scans and requested to be seen for new onset of swelling and pain LLL. States she noticed it ~ 2 weeks ago and has been keeping her leg elevated for much of the time. There is no erythema. She denies fevers. She states that over the last few days she noticed that her leg feels hard to touch and is tender. No other complaints. Denies injury or fall.  [de-identified] : pleomophic,

## 2020-10-02 LAB
ALBUMIN SERPL ELPH-MCNC: 3.7 G/DL
ALP BLD-CCNC: 118 U/L
ALT SERPL-CCNC: 15 U/L
ANION GAP SERPL CALC-SCNC: 10 MMOL/L
AST SERPL-CCNC: 20 U/L
BILIRUB SERPL-MCNC: 0.3 MG/DL
BUN SERPL-MCNC: 31 MG/DL
CALCIUM SERPL-MCNC: 8.6 MG/DL
CEA SERPL-MCNC: 2.7 NG/ML
CHLORIDE SERPL-SCNC: 104 MMOL/L
CO2 SERPL-SCNC: 22 MMOL/L
CREAT SERPL-MCNC: 1.07 MG/DL
GLUCOSE SERPL-MCNC: 88 MG/DL
POTASSIUM SERPL-SCNC: 4.7 MMOL/L
PROT SERPL-MCNC: 6.2 G/DL
SODIUM SERPL-SCNC: 136 MMOL/L

## 2020-10-06 ENCOUNTER — APPOINTMENT (OUTPATIENT)
Dept: HEMATOLOGY ONCOLOGY | Facility: CLINIC | Age: 70
End: 2020-10-06
Payer: MEDICARE

## 2020-10-06 PROCEDURE — 99215 OFFICE O/P EST HI 40 MIN: CPT | Mod: 95

## 2020-10-06 RX ORDER — DOCUSATE SODIUM 100 MG/1
100 CAPSULE ORAL TWICE DAILY
Qty: 60 | Refills: 0 | Status: DISCONTINUED | COMMUNITY
Start: 2020-01-06 | End: 2020-10-06

## 2020-10-06 RX ORDER — OSIMERTINIB 80 1/1
80 TABLET, FILM COATED ORAL DAILY
Qty: 30 | Refills: 11 | Status: DISCONTINUED | COMMUNITY
Start: 2020-03-27 | End: 2020-10-06

## 2020-10-06 NOTE — HISTORY OF PRESENT ILLNESS
[Home] : at home, [unfilled] , at the time of the visit. [Medical Office: ([facility])___] : at the medical office located in  [Spouse] : spouse [Verbal consent obtained from patient] : the patient, [unfilled] [FreeTextEntry4] : Magnolia Forrest [Disease: _____________________] : Disease: [unfilled] [T: ___] : T[unfilled] [N: ___] : N[unfilled] [M: ___] : M[unfilled] [AJCC Stage: ____] : AJCC Stage: [unfilled] [de-identified] : Ms. Mcintyre is a pleasant 70-year-old woman with recently diagnosed lung adeno ca, here for a follow up visit. \par Breif hx- Ms. 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 She was started on Gilotrif 30 mg in 01/2018 which she did not tolerate well due to cutaneous toxicity for which dose gilotrif at reduced dose of 20mg June 14th. She is tolerating this well without return of previous skin toxicity experienced at higher dose. Pt has no new complaints. She reports no rash, nail issues have resolved. She has one loose BM daily. Weight is stable. Take a nap during day time. Reports nocturia which is not new. CT scan of the chest in Jan 2019 and 04/22/19 show stable nodule. CT of the pelvis showed hypodensity in the uterus and prominent cervix. She has since seen her gyn and everything was reportedly normal. \par No new complaints. Some nail changes- had some podiatric procedures and is completing abx, foot swelling. \par \par 8/23/19: Some nasal congestion. No rash but still nail changes. Eating well and gaining weight.\par \par 10/4/19: Pt feeling well. Returns for routine f/u and discussion of imaging findings. She offers no new complaints. She currently has a slight flare in usual great toe paronychia. No fevers. On minocycline\par \par 12/12/19: Pt returns for follow up. She is feeling great. No complaints. Tolerating  afatinib with minimal AE....mild rash well controlled with minocycline and clobestrol shampoo. Reports had recent mammo which was normal. \par \par 2/30/20: Pt c/o c/l pleuritic chest pain. Some cough but nio other complaints. Rash and GI discomfort stable. \par \par 5/8/20: Pt has gained weight. Neck is swollen and so are her ankles. Appetite is much improved. Cough has improved. She has no chest pain but she has been taking Tylenol round the clock in anticipation of pain. 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 \par \par 7/10/20: Fatigue and sleeping a lot. Tolerating medication. Has heart burn and takes OTC meds for that. Has some foot pain and is seeing a podiatrist tomorrow. Has one nail on left foot that is swollen and red. Interval decrease in size of right middle and lower lobe nodules since 2/19/2020. New indeterminate 0.5 cm nodular opacities in the left upper and lower lobes as described above. \par \par 8/4/20: Fatigue, nail and skin changes improved. Appetite decreased. Lost almost 5 kg since last visit here in the office. \par \par 9/4/20: Fatigue, anorexia has improved. Thinks she has gained weight. \par \par 10/1/2020: Ms. Mcintyre presented at the  after having her ct scans and requested to be seen for new onset of swelling and pain LLL. States she noticed it ~ 2 weeks ago and has been keeping her leg elevated for much of the time. There is no erythema. She denies fevers. She states that over the last few days she noticed that her leg feels hard to touch and is tender. No other complaints. Denies injury or fall. \par \par 10/6/20: Last visit, she was referred for doppler which detected DVT- attributes to some trauma after a large dog stepped on her toes. Started on Eliquis which she is tolerated without any issues. CT done on 10/1 showed rt main PE but there was partial response noted on tumor.  [de-identified] : pleomophic,

## 2020-10-06 NOTE — ASSESSMENT
[FreeTextEntry1] : 69 yo w with recently diagnosed lung ca on afatinib 20mg daily with new scans showing progression of disease\par BAsed on recent PET/CT and brain MRI, there was FDG-uptake in all areas noted on the scan convincing for POD. MRI shows no mets\par Peripheral blood NGS did not reveal any EGFR mut. \par She started Osimertinib 80 mg daily beginning of April at 80 mg daily which she had a hard time handling but is tolerating 40 mg well with no AEs. \par Unfortunately, had a DVT and PE as noted on scans on 10/1 as noted above. On Eliquis for the same. Discussed the medication in detail- with pharmacist in attendance. \par Reviewed recent blood work and scans- CEA normal\par Scans continues to show SD [Palliative] : Goals of care discussed with patient: Palliative [Palliative Care Plan] : Patient was apprised of incurable nature of disease.  Hospice and Palliative care options discussed.

## 2020-10-06 NOTE — PHYSICAL EXAM
[Restricted in physically strenuous activity but ambulatory and able to carry out work of a light or sedentary nature] : Status 1- Restricted in physically strenuous activity but ambulatory and able to carry out work of a light or sedentary nature, e.g., light house work, office work [Normal] : affect appropriate [de-identified] : Pale [de-identified] : LLE swelling/ extremity firm/ tender No erythema [de-identified] : see vascular

## 2020-10-06 NOTE — REVIEW OF SYSTEMS
[Fatigue] : no fatigue [Recent Change In Weight] : ~T no recent weight change [Chest Pain] : no chest pain [Lower Ext Edema] : lower extremity edema [Shortness Of Breath] : no shortness of breath [Cough] : no cough [SOB on Exertion] : no shortness of breath during exertion [Negative] : Allergic/Immunologic [FreeTextEntry5] : LLE improved [FreeTextEntry6] : much improved

## 2020-10-06 NOTE — REASON FOR VISIT
[Urgent Visit] : an urgent  [Spouse] : spouse [FreeTextEntry2] : Genesis Herrera [TWNoteComboBox1] : American Sign Language

## 2020-10-14 LAB
ALBUMIN SERPL ELPH-MCNC: 3.7 G/DL
ALP BLD-CCNC: 120 U/L
ALT SERPL-CCNC: 15 U/L
ANION GAP SERPL CALC-SCNC: 13 MMOL/L
AST SERPL-CCNC: 21 U/L
BILIRUB SERPL-MCNC: 0.3 MG/DL
BUN SERPL-MCNC: 31 MG/DL
CALCIUM SERPL-MCNC: 8.6 MG/DL
CEA SERPL-MCNC: 2.6 NG/ML
CHLORIDE SERPL-SCNC: 105 MMOL/L
CO2 SERPL-SCNC: 20 MMOL/L
CREAT SERPL-MCNC: 1.09 MG/DL
GLUCOSE SERPL-MCNC: 90 MG/DL
MAGNESIUM SERPL-MCNC: 1.9 MG/DL
POTASSIUM SERPL-SCNC: 4.7 MMOL/L
PROT SERPL-MCNC: 6.3 G/DL
SODIUM SERPL-SCNC: 138 MMOL/L
TSH SERPL-ACNC: 0.98 UIU/ML

## 2020-10-22 ENCOUNTER — NON-APPOINTMENT (OUTPATIENT)
Age: 70
End: 2020-10-22

## 2020-10-30 ENCOUNTER — APPOINTMENT (OUTPATIENT)
Dept: INTERNAL MEDICINE | Facility: CLINIC | Age: 70
End: 2020-10-30
Payer: MEDICARE

## 2020-10-30 VITALS
SYSTOLIC BLOOD PRESSURE: 125 MMHG | OXYGEN SATURATION: 99 % | BODY MASS INDEX: 28.34 KG/M2 | WEIGHT: 154 LBS | HEART RATE: 81 BPM | HEIGHT: 62 IN | DIASTOLIC BLOOD PRESSURE: 60 MMHG

## 2020-10-30 DIAGNOSIS — Z23 ENCOUNTER FOR IMMUNIZATION: ICD-10-CM

## 2020-10-30 PROCEDURE — G0008: CPT

## 2020-10-30 PROCEDURE — 99072 ADDL SUPL MATRL&STAF TM PHE: CPT

## 2020-10-30 PROCEDURE — 90662 IIV NO PRSV INCREASED AG IM: CPT

## 2020-10-30 PROCEDURE — 99214 OFFICE O/P EST MOD 30 MIN: CPT | Mod: 25

## 2020-11-01 NOTE — ASSESSMENT
[FreeTextEntry1] : Has f/u with vasc scheduled.\par Flu shot today\par No need for blood work today as was recently done by heme/onc.

## 2020-11-01 NOTE — REVIEW OF SYSTEMS
[Negative] : Heme/Lymph [Chest Pain] : no chest pain [Palpitations] : no palpitations [FreeTextEntry5] : see hpi

## 2020-11-01 NOTE — PHYSICAL EXAM
[No Acute Distress] : no acute distress [Well-Appearing] : well-appearing [EOMI] : extraocular movements intact [Normal Oropharynx] : the oropharynx was normal [No Respiratory Distress] : no respiratory distress  [No Accessory Muscle Use] : no accessory muscle use [Clear to Auscultation] : lungs were clear to auscultation bilaterally [Normal Rate] : normal rate  [Regular Rhythm] : with a regular rhythm [Normal S1, S2] : normal S1 and S2 [Pedal Pulses Present] : the pedal pulses are present [No Extremity Clubbing/Cyanosis] : no extremity clubbing/cyanosis [Soft] : abdomen soft [Non Tender] : non-tender [No CVA Tenderness] : no CVA  tenderness [No Spinal Tenderness] : no spinal tenderness [No Joint Swelling] : no joint swelling [Grossly Normal Strength/Tone] : grossly normal strength/tone [No Rash] : no rash [Normal] : normal texture and mobility [Coordination Grossly Intact] : coordination grossly intact [No Focal Deficits] : no focal deficits [Normal Affect] : the affect was normal [de-identified] : LLE pitting edema ~ 2+3

## 2020-11-01 NOTE — HISTORY OF PRESENT ILLNESS
[FreeTextEntry2] : Maris Murphy [TWNoteComboBox1] : American Sign Language [de-identified] : RECENTLY DX WITH DVT AND PE - on Eliquis. \par On October 1, Ms Mcintyre presented at her hematologists office with complaint of swelling in her left lower extremity for a couple of weeks.\par She's being treated for a non-small cell lung cancer initially diagnosed 12/12/17.  She had been on afatinib however progression of dz noted and was changed to Osimertinib in April - is tolerating 40 mg without AE. \par Was sent for Doppler which identified a left LE DVT - she was started on Eliquis. also of note, CT scan done on Oct 1st identified a right main PE as well as partial response of the tumor.\par She is concerned today as her leg is still somewhat swollen although not red or tender.  She has not been experiencing any SOB, dizziness or lightheadedness.  \par Seen by Onc - CT scan also shows stable lung lesion. to f/u in 3 months.\par has appt scheduled with vascular - ok.\par last PAP 2 yrs ago - all normal - due next year. \par

## 2020-11-11 ENCOUNTER — TRANSCRIPTION ENCOUNTER (OUTPATIENT)
Age: 70
End: 2020-11-11

## 2020-11-11 ENCOUNTER — APPOINTMENT (OUTPATIENT)
Dept: CARDIOLOGY | Facility: CLINIC | Age: 70
End: 2020-11-11
Payer: MEDICARE

## 2020-11-11 VITALS
TEMPERATURE: 98 F | OXYGEN SATURATION: 98 % | DIASTOLIC BLOOD PRESSURE: 71 MMHG | SYSTOLIC BLOOD PRESSURE: 122 MMHG | HEART RATE: 70 BPM

## 2020-11-11 PROCEDURE — 99072 ADDL SUPL MATRL&STAF TM PHE: CPT

## 2020-11-11 PROCEDURE — 99204 OFFICE O/P NEW MOD 45 MIN: CPT

## 2020-11-11 NOTE — ASSESSMENT
[FreeTextEntry1] : Assessment:\par 1.  LLE DVT - extensive.  \par 2.  PE\par 3.  Lung CA\par 4.  Hearing impaired\par \par \par Plan\par 1.  Repeat venous duplex to assess DVT stability and rule out progression\par 2.  Start prescription compression garments knee high 15-20 mmHg\par 3.  Continue Eliquis 5mg BID for now.  Do not stop this or hold unless absolutely necessary.  \par 4.  Followup with oncology\par 5.  If there is progression or propagation, need to consider lovenox (not yet though)\par 6.  Leg elevation, compression, time and reassurance.\par 7.  Return 3 months\par

## 2020-11-11 NOTE — REASON FOR VISIT
[Initial Evaluation] : an initial evaluation of [FreeTextEntry2] : DVT, leg swelling [FreeTextEntry1] : 70 F joined by .  \par \par H/O Lung Adeno.  \par October was found to have extensive LLE DVT (femoral) and PE.  Started on eliquis. 10mg BID , now on eliquis 5mg BID.  no issues with the DOAC.  No bleeding.  no blood in the urine or stool\par She does have some LLE edema.  \par She does have some hemmorroidal bleeding (mild ) on her stool\par Not wearing a compression garment. \par No pain in the leg.\par Some cramping of the leg sometimes.

## 2020-11-11 NOTE — PHYSICAL EXAM
[General Appearance - Well Developed] : well developed [Normal Appearance] : normal appearance [Well Groomed] : well groomed [General Appearance - Well Nourished] : well nourished [No Deformities] : no deformities [General Appearance - In No Acute Distress] : no acute distress [Normal Conjunctiva] : the conjunctiva exhibited no abnormalities [Eyelids - No Xanthelasma] : the eyelids demonstrated no xanthelasmas [Normal Jugular Venous A Waves Present] : normal jugular venous A waves present [Normal Jugular Venous V Waves Present] : normal jugular venous V waves present [No Jugular Venous Mancuso A Waves] : no jugular venous mancuso A waves [Heart Rate And Rhythm] : heart rate and rhythm were normal [Heart Sounds] : normal S1 and S2 [Murmurs] : no murmurs present [Respiration, Rhythm And Depth] : normal respiratory rhythm and effort [Exaggerated Use Of Accessory Muscles For Inspiration] : no accessory muscle use [Auscultation Breath Sounds / Voice Sounds] : lungs were clear to auscultation bilaterally [Abdomen Soft] : soft [Abdomen Tenderness] : non-tender [Abdomen Mass (___ Cm)] : no abdominal mass palpated [Abnormal Walk] : normal gait [Gait - Sufficient For Exercise Testing] : the gait was sufficient for exercise testing [FreeTextEntry1] : LLE edema.   [Skin Color & Pigmentation] : normal skin color and pigmentation [] : no rash [No Venous Stasis] : no venous stasis [Skin Lesions] : no skin lesions [No Skin Ulcers] : no skin ulcer [No Xanthoma] : no  xanthoma was observed

## 2020-11-18 ENCOUNTER — RESULT REVIEW (OUTPATIENT)
Age: 70
End: 2020-11-18

## 2020-11-18 ENCOUNTER — OUTPATIENT (OUTPATIENT)
Dept: OUTPATIENT SERVICES | Facility: HOSPITAL | Age: 70
LOS: 1 days | End: 2020-11-18
Payer: MEDICARE

## 2020-11-18 ENCOUNTER — APPOINTMENT (OUTPATIENT)
Dept: ULTRASOUND IMAGING | Facility: HOSPITAL | Age: 70
End: 2020-11-18

## 2020-11-18 DIAGNOSIS — Z98.89 OTHER SPECIFIED POSTPROCEDURAL STATES: Chronic | ICD-10-CM

## 2020-11-18 DIAGNOSIS — M23.91 UNSPECIFIED INTERNAL DERANGEMENT OF RIGHT KNEE: Chronic | ICD-10-CM

## 2020-11-18 DIAGNOSIS — I87.2 VENOUS INSUFFICIENCY (CHRONIC) (PERIPHERAL): ICD-10-CM

## 2020-11-18 DIAGNOSIS — Z98.49 CATARACT EXTRACTION STATUS, UNSPECIFIED EYE: Chronic | ICD-10-CM

## 2020-11-18 PROCEDURE — 93970 EXTREMITY STUDY: CPT

## 2020-11-18 PROCEDURE — 93970 EXTREMITY STUDY: CPT | Mod: 26

## 2020-11-19 ENCOUNTER — TRANSCRIPTION ENCOUNTER (OUTPATIENT)
Age: 70
End: 2020-11-19

## 2020-11-23 ENCOUNTER — TRANSCRIPTION ENCOUNTER (OUTPATIENT)
Age: 70
End: 2020-11-23

## 2020-12-11 ENCOUNTER — OUTPATIENT (OUTPATIENT)
Dept: OUTPATIENT SERVICES | Facility: HOSPITAL | Age: 70
LOS: 1 days | Discharge: HOME | End: 2020-12-11

## 2020-12-11 DIAGNOSIS — Z98.89 OTHER SPECIFIED POSTPROCEDURAL STATES: Chronic | ICD-10-CM

## 2020-12-11 DIAGNOSIS — Z98.49 CATARACT EXTRACTION STATUS, UNSPECIFIED EYE: Chronic | ICD-10-CM

## 2020-12-11 DIAGNOSIS — M23.91 UNSPECIFIED INTERNAL DERANGEMENT OF RIGHT KNEE: Chronic | ICD-10-CM

## 2020-12-11 DIAGNOSIS — C34.90 MALIGNANT NEOPLASM OF UNSPECIFIED PART OF UNSPECIFIED BRONCHUS OR LUNG: ICD-10-CM

## 2020-12-15 ENCOUNTER — RESULT REVIEW (OUTPATIENT)
Age: 70
End: 2020-12-15

## 2020-12-15 ENCOUNTER — APPOINTMENT (OUTPATIENT)
Dept: HEMATOLOGY ONCOLOGY | Facility: CLINIC | Age: 70
End: 2020-12-15
Payer: MEDICARE

## 2020-12-15 VITALS
HEART RATE: 68 BPM | OXYGEN SATURATION: 99 % | RESPIRATION RATE: 16 BRPM | DIASTOLIC BLOOD PRESSURE: 78 MMHG | WEIGHT: 158.29 LBS | BODY MASS INDEX: 28.95 KG/M2 | SYSTOLIC BLOOD PRESSURE: 136 MMHG | TEMPERATURE: 98 F

## 2020-12-15 PROBLEM — J06.9 VIRAL UPPER RESPIRATORY TRACT INFECTION WITH COUGH: Status: RESOLVED | Noted: 2017-11-22 | Resolved: 2020-12-15

## 2020-12-15 LAB
BASOPHILS # BLD AUTO: 0.04 K/UL — SIGNIFICANT CHANGE UP (ref 0–0.2)
BASOPHILS NFR BLD AUTO: 0.6 % — SIGNIFICANT CHANGE UP (ref 0–2)
EOSINOPHIL # BLD AUTO: 0.17 K/UL — SIGNIFICANT CHANGE UP (ref 0–0.5)
EOSINOPHIL NFR BLD AUTO: 2.7 % — SIGNIFICANT CHANGE UP (ref 0–6)
HCT VFR BLD CALC: 39.5 % — SIGNIFICANT CHANGE UP (ref 34.5–45)
HGB BLD-MCNC: 12.8 G/DL — SIGNIFICANT CHANGE UP (ref 11.5–15.5)
IMM GRANULOCYTES NFR BLD AUTO: 0.2 % — SIGNIFICANT CHANGE UP (ref 0–1.5)
LYMPHOCYTES # BLD AUTO: 1.98 K/UL — SIGNIFICANT CHANGE UP (ref 1–3.3)
LYMPHOCYTES # BLD AUTO: 31.6 % — SIGNIFICANT CHANGE UP (ref 13–44)
MCHC RBC-ENTMCNC: 30.7 PG — SIGNIFICANT CHANGE UP (ref 27–34)
MCHC RBC-ENTMCNC: 32.4 G/DL — SIGNIFICANT CHANGE UP (ref 32–36)
MCV RBC AUTO: 94.7 FL — SIGNIFICANT CHANGE UP (ref 80–100)
MONOCYTES # BLD AUTO: 0.64 K/UL — SIGNIFICANT CHANGE UP (ref 0–0.9)
MONOCYTES NFR BLD AUTO: 10.2 % — SIGNIFICANT CHANGE UP (ref 2–14)
NEUTROPHILS # BLD AUTO: 3.42 K/UL — SIGNIFICANT CHANGE UP (ref 1.8–7.4)
NEUTROPHILS NFR BLD AUTO: 54.7 % — SIGNIFICANT CHANGE UP (ref 43–77)
NRBC # BLD: 0 /100 WBCS — SIGNIFICANT CHANGE UP (ref 0–0)
PLATELET # BLD AUTO: 177 K/UL — SIGNIFICANT CHANGE UP (ref 150–400)
RBC # BLD: 4.17 M/UL — SIGNIFICANT CHANGE UP (ref 3.8–5.2)
RBC # FLD: 13.7 % — SIGNIFICANT CHANGE UP (ref 10.3–14.5)
WBC # BLD: 6.26 K/UL — SIGNIFICANT CHANGE UP (ref 3.8–10.5)
WBC # FLD AUTO: 6.26 K/UL — SIGNIFICANT CHANGE UP (ref 3.8–10.5)

## 2020-12-15 PROCEDURE — 99072 ADDL SUPL MATRL&STAF TM PHE: CPT

## 2020-12-15 PROCEDURE — 99215 OFFICE O/P EST HI 40 MIN: CPT

## 2020-12-15 RX ORDER — AMOXICILLIN 500 MG/1
500 TABLET, FILM COATED ORAL 3 TIMES DAILY
Qty: 21 | Refills: 0 | Status: DISCONTINUED | COMMUNITY
Start: 2020-08-04 | End: 2020-12-15

## 2020-12-15 RX ORDER — LIDOCAINE 5 G/100G
5 OINTMENT TOPICAL
Qty: 1 | Refills: 0 | Status: DISCONTINUED | COMMUNITY
Start: 2018-04-27 | End: 2020-12-15

## 2020-12-15 RX ORDER — ZOSTER VACCINE RECOMBINANT, ADJUVANTED 50 MCG/0.5
50 KIT INTRAMUSCULAR
Qty: 1 | Refills: 0 | Status: DISCONTINUED | COMMUNITY
Start: 2019-05-22 | End: 2020-12-15

## 2020-12-15 RX ORDER — FAMOTIDINE 20 MG/1
20 TABLET, FILM COATED ORAL DAILY
Refills: 0 | Status: DISCONTINUED | COMMUNITY
Start: 2019-03-19 | End: 2020-12-15

## 2020-12-15 NOTE — HISTORY OF PRESENT ILLNESS
[Disease: _____________________] : Disease: [unfilled] [T: ___] : T[unfilled] [N: ___] : N[unfilled] [M: ___] : M[unfilled] [AJCC Stage: ____] : AJCC Stage: [unfilled] [de-identified] : Ms. Mcintyre is a pleasant 70-year-old woman with recently diagnosed lung adeno ca, here for a follow up visit. \par Breif hx- Ms. 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 She was started on Gilotrif 30 mg in 01/2018 which she did not tolerate well due to cutaneous toxicity for which dose gilotrif at reduced dose of 20mg June 14th. She is tolerating this well without return of previous skin toxicity experienced at higher dose. Pt has no new complaints. She reports no rash, nail issues have resolved. She has one loose BM daily. Weight is stable. Take a nap during day time. Reports nocturia which is not new. CT scan of the chest in Jan 2019 and 04/22/19 show stable nodule. CT of the pelvis showed hypodensity in the uterus and prominent cervix. She has since seen her gyn and everything was reportedly normal. \par No new complaints. Some nail changes- had some podiatric procedures and is completing abx, foot swelling. \par \par 8/23/19: Some nasal congestion. No rash but still nail changes. Eating well and gaining weight.\par \par 10/4/19: Pt feeling well. Returns for routine f/u and discussion of imaging findings. She offers no new complaints. She currently has a slight flare in usual great toe paronychia. No fevers. On minocycline\par \par 12/12/19: Pt returns for follow up. She is feeling great. No complaints. Tolerating  afatinib with minimal AE....mild rash well controlled with minocycline and clobestrol shampoo. Reports had recent mammo which was normal. \par \par 2/30/20: Pt c/o c/l pleuritic chest pain. Some cough but nio other complaints. Rash and GI discomfort stable. \par \par 5/8/20: Pt has gained weight. Neck is swollen and so are her ankles. Appetite is much improved. Cough has improved. She has no chest pain but she has been taking Tylenol round the clock in anticipation of pain. 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 \par \par 7/10/20: Fatigue and sleeping a lot. Tolerating medication. Has heart burn and takes OTC meds for that. Has some foot pain and is seeing a podiatrist tomorrow. Has one nail on left foot that is swollen and red. Interval decrease in size of right middle and lower lobe nodules since 2/19/2020. New indeterminate 0.5 cm nodular opacities in the left upper and lower lobes as described above. \par \par 8/4/20: Fatigue, nail and skin changes improved. Appetite decreased. Lost almost 5 kg since last visit here in the office. \par \par 9/4/20: Fatigue, anorexia has improved. Thinks she has gained weight. \par \par 10/1/2020: Ms. Mcintyre presented at the  after having her ct scans and requested to be seen for new onset of swelling and pain LLL. States she noticed it ~ 2 weeks ago and has been keeping her leg elevated for much of the time. There is no erythema. She denies fevers. She states that over the last few days she noticed that her leg feels hard to touch and is tender. No other complaints. Denies injury or fall. \par \par 10/6/20: Last visit, she was referred for doppler which detected DVT- attributes to some trauma after a large dog stepped on her toes. Started on Eliquis which she is tolerated without any issues. CT done on 10/1 showed rt main PE but there was partial response noted on tumor. \par \par 12/15/20: Pt is now on Eliquis for DVT and PE. Tolerating well. Taking Tagrisso with no issues.  [de-identified] : pleomophic,

## 2020-12-15 NOTE — PHYSICAL EXAM
[Restricted in physically strenuous activity but ambulatory and able to carry out work of a light or sedentary nature] : Status 1- Restricted in physically strenuous activity but ambulatory and able to carry out work of a light or sedentary nature, e.g., light house work, office work [Normal] : affect appropriate [de-identified] : Pale [de-identified] : LLE swelling/ extremity firm/ tender No erythema [de-identified] : see vascular

## 2020-12-15 NOTE — ASSESSMENT
[Palliative] : Goals of care discussed with patient: Palliative [Palliative Care Plan] : Patient was apprised of incurable nature of disease.  Hospice and Palliative care options discussed. [FreeTextEntry1] : 69 yo w with recently diagnosed lung ca on afatinib 20mg daily with new scans showing progression of disease\par BAsed on recent PET/CT and brain MRI, there was FDG-uptake in all areas noted on the scan convincing for POD. MRI shows no mets\par Peripheral blood NGS did not reveal any EGFR mut. \par She started Osimertinib 80 mg daily beginning of April at 80 mg daily which she had a hard time handling but is tolerating 40 mg well with no AEs. \par Unfortunately, had a DVT and PE as noted on scans on 10/1 as noted above. On Eliquis for the same. Discussed the medication in detail- with pharmacist in attendance. \par Paronychia- liquid Bandaid\par Reviewed recent blood work and scans- CEA normal\par Scans continues to show SC

## 2020-12-15 NOTE — REVIEW OF SYSTEMS
[Lower Ext Edema] : lower extremity edema [Negative] : Allergic/Immunologic [Fatigue] : no fatigue [Recent Change In Weight] : ~T no recent weight change [Chest Pain] : no chest pain [Shortness Of Breath] : no shortness of breath [Cough] : no cough [SOB on Exertion] : no shortness of breath during exertion [FreeTextEntry5] : LLE improved [FreeTextEntry6] : much improved

## 2020-12-17 LAB
ALBUMIN SERPL ELPH-MCNC: 3.8 G/DL
ALP BLD-CCNC: 107 U/L
ALT SERPL-CCNC: 14 U/L
ANION GAP SERPL CALC-SCNC: 9 MMOL/L
AST SERPL-CCNC: 20 U/L
BILIRUB SERPL-MCNC: 0.3 MG/DL
BUN SERPL-MCNC: 32 MG/DL
CALCIUM SERPL-MCNC: 9 MG/DL
CEA SERPL-MCNC: 3 NG/ML
CHLORIDE SERPL-SCNC: 106 MMOL/L
CO2 SERPL-SCNC: 23 MMOL/L
CREAT SERPL-MCNC: 1.21 MG/DL
GLUCOSE SERPL-MCNC: 91 MG/DL
MAGNESIUM SERPL-MCNC: 1.9 MG/DL
POTASSIUM SERPL-SCNC: 4.2 MMOL/L
PROT SERPL-MCNC: 6.4 G/DL
SODIUM SERPL-SCNC: 139 MMOL/L
TSH SERPL-ACNC: 1.41 UIU/ML

## 2021-01-06 ENCOUNTER — APPOINTMENT (OUTPATIENT)
Dept: GASTROENTEROLOGY | Facility: CLINIC | Age: 71
End: 2021-01-06
Payer: MEDICARE

## 2021-01-06 VITALS
WEIGHT: 156 LBS | HEART RATE: 87 BPM | OXYGEN SATURATION: 98 % | BODY MASS INDEX: 28.71 KG/M2 | TEMPERATURE: 95.6 F | HEIGHT: 62 IN | SYSTOLIC BLOOD PRESSURE: 124 MMHG | DIASTOLIC BLOOD PRESSURE: 70 MMHG

## 2021-01-06 DIAGNOSIS — K59.09 OTHER CONSTIPATION: ICD-10-CM

## 2021-01-06 PROCEDURE — 99213 OFFICE O/P EST LOW 20 MIN: CPT

## 2021-01-06 PROCEDURE — 99072 ADDL SUPL MATRL&STAF TM PHE: CPT

## 2021-01-06 NOTE — ASSESSMENT
[FreeTextEntry1] : This is a 70-year-old female with chronic constipation and hemorrhoids.  I encouraged her to continue on the stool softeners twice a day.  I recommend adding MiraLAX nightly.  If she continues to have constipation despite the addition of MiraLAX after 1 to 2 weeks, she is to give me a call.  Prescription for hydrocortisone cream for her hemorrhoids to be taken as needed was sent to the pharmacy.  She is to call me with questions or concerns otherwise I will see her for follow-up in 1 year.

## 2021-01-06 NOTE — HISTORY OF PRESENT ILLNESS
[FreeTextEntry1] : Kamla presents for a follow-up visit.  She is accompanied by a .  She reports of continued occasional constipation approximately twice a month with hard stools and straining.  She is taking stool softener twice a day.  She does not take MiraLAX.  However she no longer has rectal pain, discomfort or bleeding.  She uses hydrocortisone cream with good response.  She denies weight loss.  She underwent a colonoscopy in 2016 with hemorrhoids otherwise normal exam.  She denies family history of colon cancer or colon polyps.

## 2021-01-11 ENCOUNTER — APPOINTMENT (OUTPATIENT)
Dept: OPHTHALMOLOGY | Facility: CLINIC | Age: 71
End: 2021-01-11

## 2021-01-13 ENCOUNTER — APPOINTMENT (OUTPATIENT)
Dept: OPHTHALMOLOGY | Facility: CLINIC | Age: 71
End: 2021-01-13
Payer: MEDICARE

## 2021-01-13 ENCOUNTER — NON-APPOINTMENT (OUTPATIENT)
Age: 71
End: 2021-01-13

## 2021-01-13 PROCEDURE — 92014 COMPRE OPH EXAM EST PT 1/>: CPT

## 2021-01-13 PROCEDURE — 92134 CPTRZ OPH DX IMG PST SGM RTA: CPT

## 2021-01-13 PROCEDURE — 99072 ADDL SUPL MATRL&STAF TM PHE: CPT

## 2021-01-27 ENCOUNTER — APPOINTMENT (OUTPATIENT)
Dept: MAMMOGRAPHY | Facility: IMAGING CENTER | Age: 71
End: 2021-01-27
Payer: MEDICARE

## 2021-01-27 ENCOUNTER — OUTPATIENT (OUTPATIENT)
Dept: OUTPATIENT SERVICES | Facility: HOSPITAL | Age: 71
LOS: 1 days | End: 2021-01-27
Payer: MEDICARE

## 2021-01-27 ENCOUNTER — RESULT REVIEW (OUTPATIENT)
Age: 71
End: 2021-01-27

## 2021-01-27 DIAGNOSIS — Z98.89 OTHER SPECIFIED POSTPROCEDURAL STATES: Chronic | ICD-10-CM

## 2021-01-27 DIAGNOSIS — M23.91 UNSPECIFIED INTERNAL DERANGEMENT OF RIGHT KNEE: Chronic | ICD-10-CM

## 2021-01-27 DIAGNOSIS — Z00.8 ENCOUNTER FOR OTHER GENERAL EXAMINATION: ICD-10-CM

## 2021-01-27 DIAGNOSIS — Z98.49 CATARACT EXTRACTION STATUS, UNSPECIFIED EYE: Chronic | ICD-10-CM

## 2021-01-27 PROCEDURE — 77063 BREAST TOMOSYNTHESIS BI: CPT

## 2021-01-27 PROCEDURE — 77067 SCR MAMMO BI INCL CAD: CPT | Mod: 26

## 2021-01-27 PROCEDURE — 77063 BREAST TOMOSYNTHESIS BI: CPT | Mod: 26

## 2021-01-27 PROCEDURE — 77067 SCR MAMMO BI INCL CAD: CPT

## 2021-02-01 ENCOUNTER — NON-APPOINTMENT (OUTPATIENT)
Age: 71
End: 2021-02-01

## 2021-02-12 ENCOUNTER — NON-APPOINTMENT (OUTPATIENT)
Age: 71
End: 2021-02-12

## 2021-02-16 ENCOUNTER — APPOINTMENT (OUTPATIENT)
Dept: INTERNAL MEDICINE | Facility: CLINIC | Age: 71
End: 2021-02-16
Payer: MEDICARE

## 2021-02-16 VITALS
WEIGHT: 157 LBS | BODY MASS INDEX: 28.89 KG/M2 | DIASTOLIC BLOOD PRESSURE: 60 MMHG | OXYGEN SATURATION: 98 % | HEIGHT: 62 IN | HEART RATE: 75 BPM | SYSTOLIC BLOOD PRESSURE: 110 MMHG

## 2021-02-16 PROCEDURE — 99214 OFFICE O/P EST MOD 30 MIN: CPT

## 2021-02-16 PROCEDURE — 99072 ADDL SUPL MATRL&STAF TM PHE: CPT

## 2021-02-17 ENCOUNTER — APPOINTMENT (OUTPATIENT)
Dept: CARDIOLOGY | Facility: CLINIC | Age: 71
End: 2021-02-17
Payer: MEDICARE

## 2021-02-17 VITALS
OXYGEN SATURATION: 98 % | DIASTOLIC BLOOD PRESSURE: 70 MMHG | BODY MASS INDEX: 28.89 KG/M2 | TEMPERATURE: 97 F | SYSTOLIC BLOOD PRESSURE: 121 MMHG | HEIGHT: 62 IN | HEART RATE: 76 BPM | WEIGHT: 157 LBS

## 2021-02-17 PROCEDURE — 99072 ADDL SUPL MATRL&STAF TM PHE: CPT

## 2021-02-17 PROCEDURE — 99214 OFFICE O/P EST MOD 30 MIN: CPT

## 2021-02-17 NOTE — ASSESSMENT
[FreeTextEntry1] : Assessment:\par 1.  LLE DVT - extensive.  \par 2.  PE\par 3.  Lung CA\par 4.  Hearing impaired\par \par \par Plan\par 1.  Repeat venous duplex to assess DVT stability and rule out progression\par 2.  Continue prescription compression garments knee high 15-20 mmHg\par 3.  Continue Eliquis 5mg BID for now. \par 4.  Followup with oncology\par 5.  Leg elevation, compression, time and reassurance.\par 6.  Return 3 months\par

## 2021-02-17 NOTE — REASON FOR VISIT
[Follow-Up - Clinic] : a clinic follow-up of [FreeTextEntry2] : DVT, leg swelling [FreeTextEntry1] : 70 F joined by .  \par \par She remains on the eliquis 5mg BID \par \par Tagrisso 40mg daily \par monocycline\par Simvastatin 20mg daily \par Eliquis 5mg BID\par \par \par She denies any blood in the urine.\par She noticed a little bit of blood in the stool, sometimes.  Seen by Dr. Fuentes, hemorhoids.\par \par The left leg overall feels ok.  She is wearing a compression garment on the left leg.\par Duplex in November

## 2021-02-17 NOTE — PHYSICAL EXAM
[General Appearance - Well Developed] : well developed [Normal Appearance] : normal appearance [Well Groomed] : well groomed [General Appearance - Well Nourished] : well nourished [No Deformities] : no deformities [General Appearance - In No Acute Distress] : no acute distress [Normal Conjunctiva] : the conjunctiva exhibited no abnormalities [Eyelids - No Xanthelasma] : the eyelids demonstrated no xanthelasmas [Normal Jugular Venous A Waves Present] : normal jugular venous A waves present [Normal Jugular Venous V Waves Present] : normal jugular venous V waves present [No Jugular Venous Mancuso A Waves] : no jugular venous mancuso A waves [Respiration, Rhythm And Depth] : normal respiratory rhythm and effort [Exaggerated Use Of Accessory Muscles For Inspiration] : no accessory muscle use [Auscultation Breath Sounds / Voice Sounds] : lungs were clear to auscultation bilaterally [Heart Rate And Rhythm] : heart rate and rhythm were normal [Heart Sounds] : normal S1 and S2 [Murmurs] : no murmurs present [Abdomen Soft] : soft [Abdomen Tenderness] : non-tender [Abdomen Mass (___ Cm)] : no abdominal mass palpated [Abnormal Walk] : normal gait [Gait - Sufficient For Exercise Testing] : the gait was sufficient for exercise testing [Skin Color & Pigmentation] : normal skin color and pigmentation [] : no rash [No Venous Stasis] : no venous stasis [Skin Lesions] : no skin lesions [No Skin Ulcers] : no skin ulcer [No Xanthoma] : no  xanthoma was observed [FreeTextEntry1] : LLE edema.

## 2021-02-21 LAB
ALBUMIN SERPL ELPH-MCNC: 3.5 G/DL
ALP BLD-CCNC: 102 U/L
ALT SERPL-CCNC: 23 U/L
ANION GAP SERPL CALC-SCNC: 11 MMOL/L
AST SERPL-CCNC: 22 U/L
BILIRUB SERPL-MCNC: 0.4 MG/DL
BUN SERPL-MCNC: 28 MG/DL
CALCIUM SERPL-MCNC: 9.1 MG/DL
CHLORIDE SERPL-SCNC: 108 MMOL/L
CHOLEST SERPL-MCNC: 148 MG/DL
CO2 SERPL-SCNC: 20 MMOL/L
CREAT SERPL-MCNC: 1.24 MG/DL
GLUCOSE SERPL-MCNC: 83 MG/DL
HDLC SERPL-MCNC: 40 MG/DL
LDLC SERPL CALC-MCNC: 65 MG/DL
NONHDLC SERPL-MCNC: 108 MG/DL
POTASSIUM SERPL-SCNC: 4.7 MMOL/L
PROT SERPL-MCNC: 6.3 G/DL
SODIUM SERPL-SCNC: 139 MMOL/L
TRIGL SERPL-MCNC: 217 MG/DL

## 2021-02-21 NOTE — HISTORY OF PRESENT ILLNESS
[Spouse] : spouse [TWNoteComboBox1] : American Sign Language [FreeTextEntry1] : Here for f/u of HLD, NSCLC, DVT,PE [de-identified] : RE HLD: on statin. Due for FLP.  Last FLP 3/19 - LDL OK however elevated triglycerides \par RE NSCLC:  dx 10/17. Recent progression, on Tagrisso. Monitored by Onc. Currently reports no cough, not SOB, no unintentional wt loss\par RE DVT/PE: identified on Scans 10/20.  On Eliquis - no report of recent bleeding or bruising. \par Got first dose of Moderna on 2/6/21 - 2nd dose scheduled early march.

## 2021-02-21 NOTE — PHYSICAL EXAM
[No Acute Distress] : no acute distress [Well-Appearing] : well-appearing [No Lymphadenopathy] : no lymphadenopathy [No Respiratory Distress] : no respiratory distress  [Clear to Auscultation] : lungs were clear to auscultation bilaterally [Regular Rhythm] : with a regular rhythm [Normal S1, S2] : normal S1 and S2 [No Edema] : there was no peripheral edema [Non Tender] : non-tender [No CVA Tenderness] : no CVA  tenderness [No Spinal Tenderness] : no spinal tenderness [Grossly Normal Strength/Tone] : grossly normal strength/tone [Normal Affect] : the affect was normal [de-identified] : no ecchymosis

## 2021-02-23 ENCOUNTER — APPOINTMENT (OUTPATIENT)
Dept: DERMATOLOGY | Facility: CLINIC | Age: 71
End: 2021-02-23
Payer: MEDICARE

## 2021-02-23 DIAGNOSIS — Z12.83 ENCOUNTER FOR SCREENING FOR MALIGNANT NEOPLASM OF SKIN: ICD-10-CM

## 2021-02-23 DIAGNOSIS — M67.449 GANGLION, UNSPECIFIED HAND: ICD-10-CM

## 2021-02-23 DIAGNOSIS — L81.9 DISORDER OF PIGMENTATION, UNSPECIFIED: ICD-10-CM

## 2021-02-23 PROCEDURE — 99213 OFFICE O/P EST LOW 20 MIN: CPT

## 2021-02-23 PROCEDURE — 99072 ADDL SUPL MATRL&STAF TM PHE: CPT

## 2021-02-23 NOTE — HISTORY OF PRESENT ILLNESS
[FreeTextEntry1] : f/u skin check  [de-identified] : BRANDYN HAQUE is a 70 year old F PMH lung cancer on afatinib here for above\par - no spots of concern today\par - no personal/fhx skin cancer \par - discoloration of lower legs, prev on minocycline prescribed by Dr. Ruiz for acneiform eruption, not long taking it.  No associated signs or symptoms.  No treatments tried.\par \par Pt is deaf,  used for the visit.

## 2021-02-23 NOTE — PHYSICAL EXAM
[Alert] : alert [Oriented x 3] : ~L oriented x 3 [Well Nourished] : well nourished [Conjunctiva Non-injected] : conjunctiva non-injected [No Visual Lymphadenopathy] : no visual  lymphadenopathy [No Clubbing] : no clubbing [No Edema] : no edema [No Bromhidrosis] : no bromhidrosis [No Chromhidrosis] : no chromhidrosis [FreeTextEntry3] : The patient was alert and oriented X 3, well nourished, and in no apparent distress. Oropharynx showed no ulcerations. There was no visible lymphadenopathy. Conjunctiva were non-injected. There was no clubbing or edema of extremities. The scalp, hair, face, eyebrows, lips, oropharynx , neck, chest, abdomen, back, buttocks, extremities X 4, hands, feet, nails were examined. Patient deferred genital examination. There was no hyperhidrosis or bromhidrosis. The following lesions are noted:\par -blue cystic nodule on left 1st DIP, +transillumination\par -blue grey hyperpigmentation on bilateral LEs\par

## 2021-03-01 ENCOUNTER — OUTPATIENT (OUTPATIENT)
Dept: OUTPATIENT SERVICES | Facility: HOSPITAL | Age: 71
LOS: 1 days | End: 2021-03-01
Payer: MEDICARE

## 2021-03-01 ENCOUNTER — RESULT REVIEW (OUTPATIENT)
Age: 71
End: 2021-03-01

## 2021-03-01 ENCOUNTER — APPOINTMENT (OUTPATIENT)
Dept: CT IMAGING | Facility: IMAGING CENTER | Age: 71
End: 2021-03-01
Payer: MEDICARE

## 2021-03-01 DIAGNOSIS — Z98.89 OTHER SPECIFIED POSTPROCEDURAL STATES: Chronic | ICD-10-CM

## 2021-03-01 DIAGNOSIS — C34.90 MALIGNANT NEOPLASM OF UNSPECIFIED PART OF UNSPECIFIED BRONCHUS OR LUNG: ICD-10-CM

## 2021-03-01 DIAGNOSIS — M23.91 UNSPECIFIED INTERNAL DERANGEMENT OF RIGHT KNEE: Chronic | ICD-10-CM

## 2021-03-01 DIAGNOSIS — Z98.49 CATARACT EXTRACTION STATUS, UNSPECIFIED EYE: Chronic | ICD-10-CM

## 2021-03-01 PROCEDURE — 71260 CT THORAX DX C+: CPT | Mod: 26

## 2021-03-01 PROCEDURE — 74160 CT ABDOMEN W/CONTRAST: CPT

## 2021-03-01 PROCEDURE — 74160 CT ABDOMEN W/CONTRAST: CPT | Mod: 26

## 2021-03-01 PROCEDURE — 71260 CT THORAX DX C+: CPT

## 2021-03-05 ENCOUNTER — NON-APPOINTMENT (OUTPATIENT)
Age: 71
End: 2021-03-05

## 2021-03-12 ENCOUNTER — OUTPATIENT (OUTPATIENT)
Dept: OUTPATIENT SERVICES | Facility: HOSPITAL | Age: 71
LOS: 1 days | Discharge: HOME | End: 2021-03-12

## 2021-03-12 DIAGNOSIS — Z98.89 OTHER SPECIFIED POSTPROCEDURAL STATES: Chronic | ICD-10-CM

## 2021-03-12 DIAGNOSIS — M23.91 UNSPECIFIED INTERNAL DERANGEMENT OF RIGHT KNEE: Chronic | ICD-10-CM

## 2021-03-12 DIAGNOSIS — C34.90 MALIGNANT NEOPLASM OF UNSPECIFIED PART OF UNSPECIFIED BRONCHUS OR LUNG: ICD-10-CM

## 2021-03-12 DIAGNOSIS — Z98.49 CATARACT EXTRACTION STATUS, UNSPECIFIED EYE: Chronic | ICD-10-CM

## 2021-03-16 ENCOUNTER — APPOINTMENT (OUTPATIENT)
Dept: HEMATOLOGY ONCOLOGY | Facility: CLINIC | Age: 71
End: 2021-03-16
Payer: MEDICARE

## 2021-03-16 VITALS
DIASTOLIC BLOOD PRESSURE: 80 MMHG | RESPIRATION RATE: 14 BRPM | HEART RATE: 70 BPM | TEMPERATURE: 98 F | WEIGHT: 157.85 LBS | BODY MASS INDEX: 28.87 KG/M2 | SYSTOLIC BLOOD PRESSURE: 150 MMHG | OXYGEN SATURATION: 98 %

## 2021-03-16 PROCEDURE — 99214 OFFICE O/P EST MOD 30 MIN: CPT

## 2021-03-16 PROCEDURE — 99072 ADDL SUPL MATRL&STAF TM PHE: CPT

## 2021-03-16 RX ORDER — APIXABAN 5 MG/1
5 TABLET, FILM COATED ORAL
Qty: 60 | Refills: 3 | Status: DISCONTINUED | COMMUNITY
Start: 2020-10-01 | End: 2021-03-16

## 2021-03-16 RX ORDER — CLOBETASOL PROPIONATE 0.05 G/100ML
0.05 SHAMPOO TOPICAL DAILY
Qty: 2 | Refills: 6 | Status: DISCONTINUED | COMMUNITY
Start: 2018-01-25 | End: 2021-03-16

## 2021-03-16 RX ORDER — CLINDAMYCIN PHOSPHATE 10 MG/ML
1 LOTION TOPICAL
Qty: 1 | Refills: 0 | Status: DISCONTINUED | COMMUNITY
Start: 2018-04-27 | End: 2021-03-16

## 2021-03-16 RX ORDER — HYDROCORTISONE 25 MG/G
2.5 CREAM TOPICAL DAILY
Qty: 1 | Refills: 5 | Status: DISCONTINUED | COMMUNITY
Start: 2018-09-05 | End: 2021-03-16

## 2021-03-16 RX ORDER — FUROSEMIDE 20 MG/1
20 TABLET ORAL
Qty: 30 | Refills: 3 | Status: DISCONTINUED | COMMUNITY
Start: 2018-06-28 | End: 2021-03-16

## 2021-03-16 RX ORDER — BENZOYL PEROXIDE 100 MG/ML
10 LIQUID TOPICAL DAILY
Qty: 1 | Refills: 4 | Status: DISCONTINUED | COMMUNITY
Start: 2018-04-27 | End: 2021-03-16

## 2021-03-16 RX ORDER — HYDROCORTISONE 25 MG/G
2.5 CREAM TOPICAL
Qty: 1 | Refills: 5 | Status: DISCONTINUED | COMMUNITY
Start: 2021-01-06 | End: 2021-03-16

## 2021-04-01 NOTE — REVIEW OF SYSTEMS
[Fatigue] : no fatigue [Recent Change In Weight] : ~T no recent weight change [Chest Pain] : no chest pain [Lower Ext Edema] : lower extremity edema [Shortness Of Breath] : no shortness of breath [Cough] : no cough [SOB on Exertion] : no shortness of breath during exertion [Diarrhea] : diarrhea [Negative] : Allergic/Immunologic [FreeTextEntry5] : LLE improved

## 2021-04-01 NOTE — HISTORY OF PRESENT ILLNESS
[Disease: _____________________] : Disease: [unfilled] [T: ___] : T[unfilled] [N: ___] : N[unfilled] [M: ___] : M[unfilled] [AJCC Stage: ____] : AJCC Stage: [unfilled] [de-identified] : Ms. Mcintyre is a pleasant 70-year-old woman with recently diagnosed lung adeno ca, here for a follow up visit. \par Breif hx- Ms. 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 She was started on Gilotrif 30 mg in 01/2018 which she did not tolerate well due to cutaneous toxicity for which dose gilotrif at reduced dose of 20mg June 14th. She is tolerating this well without return of previous skin toxicity experienced at higher dose. Pt has no new complaints. She reports no rash, nail issues have resolved. She has one loose BM daily. Weight is stable. Take a nap during day time. Reports nocturia which is not new. CT scan of the chest in Jan 2019 and 04/22/19 show stable nodule. CT of the pelvis showed hypodensity in the uterus and prominent cervix. She has since seen her gyn and everything was reportedly normal. \par No new complaints. Some nail changes- had some podiatric procedures and is completing abx, foot swelling. \par \par 8/23/19: Some nasal congestion. No rash but still nail changes. Eating well and gaining weight.\par \par 10/4/19: Pt feeling well. Returns for routine f/u and discussion of imaging findings. She offers no new complaints. She currently has a slight flare in usual great toe paronychia. No fevers. On minocycline\par \par 12/12/19: Pt returns for follow up. She is feeling great. No complaints. Tolerating  afatinib with minimal AE....mild rash well controlled with minocycline and clobestrol shampoo. Reports had recent mammo which was normal. \par \par 2/30/20: Pt c/o c/l pleuritic chest pain. Some cough but nio other complaints. Rash and GI discomfort stable. \par \par 5/8/20: Pt has gained weight. Neck is swollen and so are her ankles. Appetite is much improved. Cough has improved. She has no chest pain but she has been taking Tylenol round the clock in anticipation of pain. 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 \par \par 7/10/20: Fatigue and sleeping a lot. Tolerating medication. Has heart burn and takes OTC meds for that. Has some foot pain and is seeing a podiatrist tomorrow. Has one nail on left foot that is swollen and red. Interval decrease in size of right middle and lower lobe nodules since 2/19/2020. New indeterminate 0.5 cm nodular opacities in the left upper and lower lobes as described above. \par \par 8/4/20: Fatigue, nail and skin changes improved. Appetite decreased. Lost almost 5 kg since last visit here in the office. \par \par 9/4/20: Fatigue, anorexia has improved. Thinks she has gained weight. \par \par 10/1/2020: Ms. Mcintyre presented at the  after having her ct scans and requested to be seen for new onset of swelling and pain LLL. States she noticed it ~ 2 weeks ago and has been keeping her leg elevated for much of the time. There is no erythema. She denies fevers. She states that over the last few days she noticed that her leg feels hard to touch and is tender. No other complaints. Denies injury or fall. \par \par 10/6/20: Last visit, she was referred for doppler which detected DVT- attributes to some trauma after a large dog stepped on her toes. Started on Eliquis which she is tolerated without any issues. CT done on 10/1 showed rt main PE but there was partial response noted on tumor. \par \par 12/15/20: Pt is now on Eliquis for DVT and PE. Tolerating well. Taking Tagrisso with no issues. \par \par 3/16/21: Patient is taking osimertinib (Tagrisso) 40 mg once daily at 10 AM with food (since August /2020). Patient was previously taking 80 mg from 3/27/2020 until 7/31/2020 and was held due to side effects, 40 mg re-started on 8/4/2020. She is tolerating this well with occasional diarrhea and dry skin as the only AE. \par \par  [de-identified] : pleomophic,

## 2021-04-01 NOTE — ASSESSMENT
[FreeTextEntry1] : 71 yo w with recently diagnosed lung ca on afatinib 20mg daily with new scans showing progression of disease\par BAsed on recent PET/CT and brain MRI, there was FDG-uptake in all areas noted on the scan convincing for POD. MRI shows no mets\par Peripheral blood NGS did not reveal any EGFR mut. \par She started Osimertinib 80 mg daily beginning of April at 80 mg daily which she had a hard time handling but is tolerating 40 mg well with no AEs which she has been taking since August 2020. \par Unfortunately, had a DVT and PE as noted on scans on 10/1 as noted above. On Eliquis for the same. Discussed the medications in detail- with pharmacist in attendance. \par Reviewed recent blood work and scans- CEA normal\par Scans (last one 3/1/21) continues to show [place] [Palliative] : Goals of care discussed with patient: Palliative [Palliative Care Plan] : Patient was apprised of incurable nature of disease.  Hospice and Palliative care options discussed.

## 2021-04-01 NOTE — PHYSICAL EXAM
[Restricted in physically strenuous activity but ambulatory and able to carry out work of a light or sedentary nature] : Status 1- Restricted in physically strenuous activity but ambulatory and able to carry out work of a light or sedentary nature, e.g., light house work, office work [Normal] : affect appropriate [de-identified] : mild ankle edema [de-identified] : dry skin

## 2021-05-06 ENCOUNTER — APPOINTMENT (OUTPATIENT)
Dept: ULTRASOUND IMAGING | Facility: HOSPITAL | Age: 71
End: 2021-05-06
Payer: MEDICARE

## 2021-05-06 ENCOUNTER — RESULT REVIEW (OUTPATIENT)
Age: 71
End: 2021-05-06

## 2021-05-06 ENCOUNTER — OUTPATIENT (OUTPATIENT)
Dept: OUTPATIENT SERVICES | Facility: HOSPITAL | Age: 71
LOS: 1 days | End: 2021-05-06
Payer: MEDICARE

## 2021-05-06 DIAGNOSIS — R60.0 LOCALIZED EDEMA: ICD-10-CM

## 2021-05-06 DIAGNOSIS — Z98.89 OTHER SPECIFIED POSTPROCEDURAL STATES: Chronic | ICD-10-CM

## 2021-05-06 DIAGNOSIS — Z00.00 ENCOUNTER FOR GENERAL ADULT MEDICAL EXAMINATION WITHOUT ABNORMAL FINDINGS: ICD-10-CM

## 2021-05-06 DIAGNOSIS — M23.91 UNSPECIFIED INTERNAL DERANGEMENT OF RIGHT KNEE: Chronic | ICD-10-CM

## 2021-05-06 DIAGNOSIS — I82.409 ACUTE EMBOLISM AND THROMBOSIS OF UNSPECIFIED DEEP VEINS OF UNSPECIFIED LOWER EXTREMITY: ICD-10-CM

## 2021-05-06 DIAGNOSIS — Z98.49 CATARACT EXTRACTION STATUS, UNSPECIFIED EYE: Chronic | ICD-10-CM

## 2021-05-06 PROCEDURE — 93970 EXTREMITY STUDY: CPT

## 2021-05-06 PROCEDURE — 93970 EXTREMITY STUDY: CPT | Mod: 26

## 2021-05-12 ENCOUNTER — NON-APPOINTMENT (OUTPATIENT)
Age: 71
End: 2021-05-12

## 2021-05-12 ENCOUNTER — APPOINTMENT (OUTPATIENT)
Dept: OPHTHALMOLOGY | Facility: CLINIC | Age: 71
End: 2021-05-12
Payer: MEDICARE

## 2021-05-12 PROCEDURE — 92286 ANT SGM IMG I&R SPECLR MIC: CPT

## 2021-05-12 PROCEDURE — 92012 INTRM OPH EXAM EST PATIENT: CPT

## 2021-05-12 PROCEDURE — 76514 ECHO EXAM OF EYE THICKNESS: CPT

## 2021-05-12 PROCEDURE — 99072 ADDL SUPL MATRL&STAF TM PHE: CPT

## 2021-05-16 ENCOUNTER — RESULT CHARGE (OUTPATIENT)
Age: 71
End: 2021-05-16

## 2021-05-17 ENCOUNTER — NON-APPOINTMENT (OUTPATIENT)
Age: 71
End: 2021-05-17

## 2021-05-18 ENCOUNTER — NON-APPOINTMENT (OUTPATIENT)
Age: 71
End: 2021-05-18

## 2021-05-18 ENCOUNTER — APPOINTMENT (OUTPATIENT)
Dept: INTERNAL MEDICINE | Facility: CLINIC | Age: 71
End: 2021-05-18
Payer: MEDICARE

## 2021-05-18 VITALS
SYSTOLIC BLOOD PRESSURE: 108 MMHG | HEART RATE: 71 BPM | OXYGEN SATURATION: 99 % | WEIGHT: 159 LBS | BODY MASS INDEX: 30.41 KG/M2 | DIASTOLIC BLOOD PRESSURE: 70 MMHG | HEIGHT: 60.5 IN

## 2021-05-18 DIAGNOSIS — Z71.89 OTHER SPECIFIED COUNSELING: ICD-10-CM

## 2021-05-18 PROCEDURE — 99497 ADVNCD CARE PLAN 30 MIN: CPT

## 2021-05-18 PROCEDURE — G0442 ANNUAL ALCOHOL SCREEN 15 MIN: CPT | Mod: 59

## 2021-05-18 PROCEDURE — G0439: CPT

## 2021-05-18 PROCEDURE — 99072 ADDL SUPL MATRL&STAF TM PHE: CPT

## 2021-05-19 ENCOUNTER — APPOINTMENT (OUTPATIENT)
Dept: CARDIOLOGY | Facility: CLINIC | Age: 71
End: 2021-05-19
Payer: MEDICARE

## 2021-05-19 VITALS
TEMPERATURE: 97.5 F | HEART RATE: 78 BPM | BODY MASS INDEX: 30.63 KG/M2 | OXYGEN SATURATION: 98 % | WEIGHT: 156 LBS | HEIGHT: 60 IN | SYSTOLIC BLOOD PRESSURE: 117 MMHG | DIASTOLIC BLOOD PRESSURE: 67 MMHG

## 2021-05-19 PROCEDURE — 99072 ADDL SUPL MATRL&STAF TM PHE: CPT

## 2021-05-19 PROCEDURE — 99214 OFFICE O/P EST MOD 30 MIN: CPT

## 2021-05-20 NOTE — REASON FOR VISIT
[Follow-Up - Clinic] : a clinic follow-up of [FreeTextEntry2] : DVT, leg swelling [FreeTextEntry1] : 5/19/2021\par  joined by .  \par \par She remains on the eliquis 5mg \par No bleeding or bruising\par Recent venous duplex no acute DVT.  There is improvement in the DVT, it appears chronic \par No blood in the urine or stool\par Her breathing is fine.\par She is wearing a compression garment.\par  \par  \par

## 2021-05-20 NOTE — ASSESSMENT
[FreeTextEntry1] : Assessment:\par 1.  LLE DVT - extensive.  \par 2.  PE\par 3.  Lung CA\par 4.  Hearing impaired\par \par \par Plan\par 1.   venous duplex shows chronic DVT, improving\par 2.  Continue prescription compression garments knee high 15-20 mmHg\par 3.  Continue Eliquis 5mg BID for now. \par 4.  Followup with oncology\par 5.  Leg elevation, compression, time and reassurance.\par 6.  Return 6 months\par

## 2021-05-21 PROBLEM — Z71.89 ADVANCE CARE PLANNING: Status: ACTIVE | Noted: 2021-05-21

## 2021-05-21 NOTE — DISCUSSION/SUMMARY
[Subsequent Annual Wellness] : Subsequent Annual Wellness Visit [Preventive Exam & Counseling Completed] : with preventive exam as well as age and risk appropriate counseling completed [EKG] : EKG recommended [Healthy Diet] : counseling was given on maintaining a healthy diet [Improve Physical Activity] : counseling was given on ways to improve physical activity [Calcium and Vitamin D Intake] : counseling was given on obtaining adequate amounts of calcium and vitamin D on a daily basis [DXA Axial] : due for DXA axial skeleton [Screening Not Indicated] : screening not indicated [Screening Current] : screening is current [Patient Declines] : the patient declines screening [Influenza Recommended Annually] : influenza vaccination is recommended annually [Lifetime Vaccine Completed] : the lifetime pneumococcal vaccine has been completed [Complete and Up to Date] : complete and up to date [Paperwork & Instructions Given] : paperwork and instructions were given to the patient [Encouraged to F/U to Discuss Questions/Decisions] : ~he/she~ was encouraged to follow-up with me to discuss ~his/her~ questions and/or decisions [Follow-Up in ___] : follow-up visit needed in [unfilled]

## 2021-05-22 NOTE — PHYSICAL EXAM
[No Acute Distress] : no acute distress [Well-Appearing] : well-appearing [Normal Sclera/Conjunctiva] : normal sclera/conjunctiva [PERRL] : pupils equal round and reactive to light [EOMI] : extraocular movements intact [No Lymphadenopathy] : no lymphadenopathy [No Respiratory Distress] : no respiratory distress  [Clear to Auscultation] : lungs were clear to auscultation bilaterally [Normal Rate] : normal rate  [Regular Rhythm] : with a regular rhythm [Normal Appearance] : normal in appearance [No Masses] : no palpable masses [No Axillary Lymphadenopathy] : no axillary lymphadenopathy [Soft] : abdomen soft [Normal Bowel Sounds] : normal bowel sounds [Normal Axillary Nodes] : no axillary lymphadenopathy [Normal Anterior Cervical Nodes] : no anterior cervical lymphadenopathy [No CVA Tenderness] : no CVA  tenderness [No Spinal Tenderness] : no spinal tenderness [No Joint Swelling] : no joint swelling [Grossly Normal Strength/Tone] : grossly normal strength/tone [No Rash] : no rash [Coordination Grossly Intact] : coordination grossly intact [Deep Tendon Reflexes (DTR)] : deep tendon reflexes were 2+ and symmetric [Normal Affect] : the affect was normal [de-identified] : mild bilateral lower rib tenderness  [de-identified] : No skin changes

## 2021-05-22 NOTE — HISTORY OF PRESENT ILLNESS
[Other: _____] : [unfilled] [Time Spent: ____ minutes] : I have spent [unfilled] minutes of time on the encounter. The patient's primary language is not English thus required  services. [FreeTextEntry2] : Kim Hirschberger [FreeTextEntry4] : longstanding  [TWNoteComboBox1] : American Sign Language [de-identified] : Interval hx: being treated for lung cancer (never smoker) by Dr Marie Ruiz - on afatinib, then started on Osimertiib after PET/CT showing some progression of disease. Course complicated by DVT/PE - on Eliquis. \par Only taking the new chemo - d/c'd the old one. \par wearing support stockings - no SOB, no leg pain\par next onc visit in June 15th\par c/o bilateral lower rib tenderness

## 2021-05-22 NOTE — HEALTH RISK ASSESSMENT
[Patient reported mammogram was normal] : Patient reported mammogram was normal [Patient reported bone density results were normal] : Patient reported bone density results were normal [Patient reported colonoscopy was normal] : Patient reported colonoscopy was normal [2 - 4 times a month (2 pts)] : 2-4 times a month (2 points) [1 or 2 (0 pts)] : 1 or 2 (0 points) [No falls in past year] : Patient reported no falls in the past year [No Retinopathy] : No retinopathy [HIV test declined] : HIV test declined [Hepatitis C test declined] : Hepatitis C test declined [None] : None [With Family] : lives with family [Retired] : retired [] :  [Fully functional (bathing, dressing, toileting, transferring, walking, feeding)] : Fully functional (bathing, dressing, toileting, transferring, walking, feeding) [Fully functional (using the telephone, shopping, preparing meals, housekeeping, doing laundry, using] : Fully functional and needs no help or supervision to perform IADLs (using the telephone, shopping, preparing meals, housekeeping, doing laundry, using transportation, managing medications and managing finances) [Smoke Detector] : smoke detector [Carbon Monoxide Detector] : carbon monoxide detector [Seat Belt] :  uses seat belt [Name: ___] : Health Care Proxy's Name: [unfilled]  [Relationship: ___] : Relationship: [unfilled] [With Patient/Caregiver] : With Patient/Caregiver [Designated Healthcare Proxy] : Designated healthcare proxy [] : No [Audit-CScore] : 2 [de-identified] : occ walking [[id number]] : 1 [EyeExamDate] : 05/21 [Change in mental status noted] : No change in mental status noted [Reports changes in hearing] : Reports no changes in hearing [Reports changes in vision] : Reports no changes in vision [MammogramDate] : 01/21 [BoneDensityDate] : 03/19 [ColonoscopyDate] : 02/16 [de-identified] : deaf [AdvancecareDate] : 05/21

## 2021-05-22 NOTE — ASSESSMENT
[FreeTextEntry1] : Annual alcohol screen completed this visit. Alcohol use within healthy limits.  Healthy drinking guidelines shared with patient (Female and male overage 65 no more than 3 SSD on any day, no more than 7 per week). Positive reinforcement provided given patient currently within healthy guidelines. \par \par Annual depression screen completed this visit and reviewed.  Screening not suggestive of diagnosis of MDD. \par \par Discussed advanced directives, Health Care Proxy, and goals of care during visit.

## 2021-05-26 ENCOUNTER — RX RENEWAL (OUTPATIENT)
Age: 71
End: 2021-05-26

## 2021-05-26 RX ORDER — DOCUSATE SODIUM 100 MG/1
100 CAPSULE, LIQUID FILLED ORAL
Qty: 60 | Refills: 5 | Status: ACTIVE | COMMUNITY
Start: 2020-09-27 | End: 1900-01-01

## 2021-06-14 ENCOUNTER — OUTPATIENT (OUTPATIENT)
Dept: OUTPATIENT SERVICES | Facility: HOSPITAL | Age: 71
LOS: 1 days | End: 2021-06-14
Payer: MEDICARE

## 2021-06-14 ENCOUNTER — APPOINTMENT (OUTPATIENT)
Dept: CT IMAGING | Facility: IMAGING CENTER | Age: 71
End: 2021-06-14
Payer: MEDICARE

## 2021-06-14 DIAGNOSIS — Z98.89 OTHER SPECIFIED POSTPROCEDURAL STATES: Chronic | ICD-10-CM

## 2021-06-14 DIAGNOSIS — Z98.49 CATARACT EXTRACTION STATUS, UNSPECIFIED EYE: Chronic | ICD-10-CM

## 2021-06-14 DIAGNOSIS — M23.91 UNSPECIFIED INTERNAL DERANGEMENT OF RIGHT KNEE: Chronic | ICD-10-CM

## 2021-06-14 DIAGNOSIS — Z00.8 ENCOUNTER FOR OTHER GENERAL EXAMINATION: ICD-10-CM

## 2021-06-14 DIAGNOSIS — C34.90 MALIGNANT NEOPLASM OF UNSPECIFIED PART OF UNSPECIFIED BRONCHUS OR LUNG: ICD-10-CM

## 2021-06-14 PROCEDURE — 71260 CT THORAX DX C+: CPT | Mod: 26

## 2021-06-14 PROCEDURE — 74160 CT ABDOMEN W/CONTRAST: CPT

## 2021-06-14 PROCEDURE — 71260 CT THORAX DX C+: CPT

## 2021-06-14 PROCEDURE — 74160 CT ABDOMEN W/CONTRAST: CPT | Mod: 26

## 2021-06-16 ENCOUNTER — OUTPATIENT (OUTPATIENT)
Dept: OUTPATIENT SERVICES | Facility: HOSPITAL | Age: 71
LOS: 1 days | Discharge: HOME | End: 2021-06-16

## 2021-06-16 DIAGNOSIS — Z98.89 OTHER SPECIFIED POSTPROCEDURAL STATES: Chronic | ICD-10-CM

## 2021-06-16 DIAGNOSIS — Z98.49 CATARACT EXTRACTION STATUS, UNSPECIFIED EYE: Chronic | ICD-10-CM

## 2021-06-16 DIAGNOSIS — C34.90 MALIGNANT NEOPLASM OF UNSPECIFIED PART OF UNSPECIFIED BRONCHUS OR LUNG: ICD-10-CM

## 2021-06-16 DIAGNOSIS — M23.91 UNSPECIFIED INTERNAL DERANGEMENT OF RIGHT KNEE: Chronic | ICD-10-CM

## 2021-06-17 ENCOUNTER — APPOINTMENT (OUTPATIENT)
Dept: HEMATOLOGY ONCOLOGY | Facility: CLINIC | Age: 71
End: 2021-06-17
Payer: MEDICARE

## 2021-06-17 VITALS
WEIGHT: 157.19 LBS | BODY MASS INDEX: 30.46 KG/M2 | HEART RATE: 88 BPM | HEIGHT: 60.04 IN | RESPIRATION RATE: 17 BRPM | OXYGEN SATURATION: 98 % | TEMPERATURE: 96.6 F | DIASTOLIC BLOOD PRESSURE: 85 MMHG | SYSTOLIC BLOOD PRESSURE: 146 MMHG

## 2021-06-17 DIAGNOSIS — R60.9 EDEMA, UNSPECIFIED: ICD-10-CM

## 2021-06-17 PROCEDURE — 99072 ADDL SUPL MATRL&STAF TM PHE: CPT

## 2021-06-17 PROCEDURE — 99214 OFFICE O/P EST MOD 30 MIN: CPT

## 2021-06-17 NOTE — ASSESSMENT
[Palliative Care Plan] : Patient was apprised of incurable nature of disease.  Hospice and Palliative care options discussed. [Palliative] : Goals of care discussed with patient: Palliative [FreeTextEntry1] : 71 yo w with recently diagnosed lung ca started initially on afatinib 20mg daily from Dec 2017 to August 2020 with new scans showing progression of disease\par BAsed on recent PET/CT and brain MRI, there was FDG-uptake in all areas noted on the scan convincing for POD. MRI shows no mets\par Peripheral blood NGS did not reveal any EGFR mut. \par She started Osimertinib 80 mg daily beginning of April at 80 mg daily which she had a hard time handling but is tolerating 40 mg well (since Feb 2021) with no AEs which she has been taking since August 2020. \par Unfortunately, had a DVT and PE as noted on scans on 10/1/20 as noted above. On Eliquis for the same. \par Reviewed recent blood work and scans- CEA normal\par Scans (last in June 2021) continues to show [place]

## 2021-06-17 NOTE — PHYSICAL EXAM
[Fully active, able to carry on all pre-disease performance without restriction] : Status 0 - Fully active, able to carry on all pre-disease performance without restriction [Normal] : affect appropriate [de-identified] : mild ankle edema [de-identified] : dry skin

## 2021-06-17 NOTE — REVIEW OF SYSTEMS
[Lower Ext Edema] : lower extremity edema [Diarrhea] : diarrhea [Negative] : Allergic/Immunologic [Fatigue] : no fatigue [Recent Change In Weight] : ~T no recent weight change [Chest Pain] : no chest pain [Shortness Of Breath] : no shortness of breath [Cough] : no cough [SOB on Exertion] : no shortness of breath during exertion [FreeTextEntry5] : LLE improved

## 2021-06-17 NOTE — HISTORY OF PRESENT ILLNESS
[Disease: _____________________] : Disease: [unfilled] [T: ___] : T[unfilled] [N: ___] : N[unfilled] [M: ___] : M[unfilled] [AJCC Stage: ____] : AJCC Stage: [unfilled] [de-identified] : Ms. Mcintyre is a pleasant 71-year-old woman with recently diagnosed lung adeno ca, here for a follow up visit. \par Breif hx- Ms. 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 She was started on Gilotrif 30 mg in 01/2018 which she did not tolerate well due to cutaneous toxicity for which dose gilotrif at reduced dose of 20mg June 14th. She is tolerating this well without return of previous skin toxicity experienced at higher dose. Pt has no new complaints. She reports no rash, nail issues have resolved. She has one loose BM daily. Weight is stable. Take a nap during day time. Reports nocturia which is not new. CT scan of the chest in Jan 2019 and 04/22/19 show stable nodule. CT of the pelvis showed hypodensity in the uterus and prominent cervix. She has since seen her gyn and everything was reportedly normal. \par No new complaints. Some nail changes- had some podiatric procedures and is completing abx, foot swelling. \par \par 8/23/19: Some nasal congestion. No rash but still nail changes. Eating well and gaining weight.\par \par 10/4/19: Pt feeling well. Returns for routine f/u and discussion of imaging findings. She offers no new complaints. She currently has a slight flare in usual great toe paronychia. No fevers. On minocycline\par \par 12/12/19: Pt returns for follow up. She is feeling great. No complaints. Tolerating  afatinib with minimal AE....mild rash well controlled with minocycline and clobestrol shampoo. Reports had recent mammo which was normal. \par \par 2/30/20: Pt c/o c/l pleuritic chest pain. Some cough but nio other complaints. Rash and GI discomfort stable. \par \par 5/8/20: Pt has gained weight. Neck is swollen and so are her ankles. Appetite is much improved. Cough has improved. She has no chest pain but she has been taking Tylenol round the clock in anticipation of pain. 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 \par \par 7/10/20: Fatigue and sleeping a lot. Tolerating medication. Has heart burn and takes OTC meds for that. Has some foot pain and is seeing a podiatrist tomorrow. Has one nail on left foot that is swollen and red. Interval decrease in size of right middle and lower lobe nodules since 2/19/2020. New indeterminate 0.5 cm nodular opacities in the left upper and lower lobes as described above. \par \par 8/4/20: Fatigue, nail and skin changes improved. Appetite decreased. Lost almost 5 kg since last visit here in the office. \par \par 9/4/20: Fatigue, anorexia has improved. Thinks she has gained weight. \par \par 10/1/2020: Ms. Mcintyre presented at the  after having her ct scans and requested to be seen for new onset of swelling and pain LLL. States she noticed it ~ 2 weeks ago and has been keeping her leg elevated for much of the time. There is no erythema. She denies fevers. She states that over the last few days she noticed that her leg feels hard to touch and is tender. No other complaints. Denies injury or fall. \par \par 10/6/20: Last visit, she was referred for doppler which detected DVT- attributes to some trauma after a large dog stepped on her toes. Started on Eliquis which she is tolerated without any issues. CT done on 10/1 showed rt main PE but there was partial response noted on tumor. \par \par 12/15/20: Pt is now on Eliquis for DVT and PE. Tolerating well. Taking Tagrisso with no issues. \par \par 3/16/21: Patient is taking osimertinib (Tagrisso) 40 mg once daily at 10 AM with food (since August /2020). Patient was previously taking 80 mg from 3/27/2020 until 7/31/2020 and was held due to side effects, 40 mg re-started on 8/4/2020. She is tolerating this well with occasional diarrhea and dry skin as the only AE. \par \par 6/11/21: Insomnia- as per family, coughs at night, pt and  not aware. \par \par  [de-identified] : pleomophic,

## 2021-08-21 ENCOUNTER — RX RENEWAL (OUTPATIENT)
Age: 71
End: 2021-08-21

## 2021-08-30 ENCOUNTER — APPOINTMENT (OUTPATIENT)
Dept: RADIOLOGY | Facility: IMAGING CENTER | Age: 71
End: 2021-08-30
Payer: MEDICARE

## 2021-08-30 ENCOUNTER — OUTPATIENT (OUTPATIENT)
Dept: OUTPATIENT SERVICES | Facility: HOSPITAL | Age: 71
LOS: 1 days | End: 2021-08-30
Payer: MEDICARE

## 2021-08-30 DIAGNOSIS — Z98.89 OTHER SPECIFIED POSTPROCEDURAL STATES: Chronic | ICD-10-CM

## 2021-08-30 DIAGNOSIS — M23.91 UNSPECIFIED INTERNAL DERANGEMENT OF RIGHT KNEE: Chronic | ICD-10-CM

## 2021-08-30 DIAGNOSIS — Z98.49 CATARACT EXTRACTION STATUS, UNSPECIFIED EYE: Chronic | ICD-10-CM

## 2021-08-30 DIAGNOSIS — Z00.00 ENCOUNTER FOR GENERAL ADULT MEDICAL EXAMINATION WITHOUT ABNORMAL FINDINGS: ICD-10-CM

## 2021-08-30 PROCEDURE — 77080 DXA BONE DENSITY AXIAL: CPT | Mod: 26

## 2021-08-30 PROCEDURE — 77080 DXA BONE DENSITY AXIAL: CPT

## 2021-09-14 ENCOUNTER — APPOINTMENT (OUTPATIENT)
Dept: INTERNAL MEDICINE | Facility: CLINIC | Age: 71
End: 2021-09-14
Payer: MEDICARE

## 2021-09-14 VITALS
DIASTOLIC BLOOD PRESSURE: 70 MMHG | HEART RATE: 73 BPM | BODY MASS INDEX: 30.43 KG/M2 | SYSTOLIC BLOOD PRESSURE: 112 MMHG | OXYGEN SATURATION: 98 % | WEIGHT: 156 LBS

## 2021-09-14 VITALS — SYSTOLIC BLOOD PRESSURE: 120 MMHG | DIASTOLIC BLOOD PRESSURE: 70 MMHG

## 2021-09-14 PROCEDURE — 99213 OFFICE O/P EST LOW 20 MIN: CPT | Mod: 25

## 2021-09-14 PROCEDURE — G0008: CPT

## 2021-09-14 PROCEDURE — 90662 IIV NO PRSV INCREASED AG IM: CPT

## 2021-09-15 ENCOUNTER — OUTPATIENT (OUTPATIENT)
Dept: OUTPATIENT SERVICES | Facility: HOSPITAL | Age: 71
LOS: 1 days | Discharge: HOME | End: 2021-09-15

## 2021-09-15 DIAGNOSIS — Z98.89 OTHER SPECIFIED POSTPROCEDURAL STATES: Chronic | ICD-10-CM

## 2021-09-15 DIAGNOSIS — Z98.49 CATARACT EXTRACTION STATUS, UNSPECIFIED EYE: Chronic | ICD-10-CM

## 2021-09-15 DIAGNOSIS — M23.91 UNSPECIFIED INTERNAL DERANGEMENT OF RIGHT KNEE: Chronic | ICD-10-CM

## 2021-09-15 DIAGNOSIS — C34.90 MALIGNANT NEOPLASM OF UNSPECIFIED PART OF UNSPECIFIED BRONCHUS OR LUNG: ICD-10-CM

## 2021-09-16 ENCOUNTER — APPOINTMENT (OUTPATIENT)
Dept: HEMATOLOGY ONCOLOGY | Facility: CLINIC | Age: 71
End: 2021-09-16
Payer: MEDICARE

## 2021-09-16 VITALS
HEIGHT: 60.24 IN | WEIGHT: 158.73 LBS | RESPIRATION RATE: 16 BRPM | BODY MASS INDEX: 30.76 KG/M2 | SYSTOLIC BLOOD PRESSURE: 118 MMHG | HEART RATE: 69 BPM | DIASTOLIC BLOOD PRESSURE: 70 MMHG | OXYGEN SATURATION: 99 % | TEMPERATURE: 96.5 F

## 2021-09-16 PROCEDURE — 99215 OFFICE O/P EST HI 40 MIN: CPT

## 2021-09-16 NOTE — HISTORY OF PRESENT ILLNESS
[Disease: _____________________] : Disease: [unfilled] [T: ___] : T[unfilled] [N: ___] : N[unfilled] [M: ___] : M[unfilled] [AJCC Stage: ____] : AJCC Stage: [unfilled] [de-identified] : Ms. Mcintyre is a pleasant 71-year-old woman with recently diagnosed lung adeno ca, here for a follow up visit. \par Breif hx- Ms. 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 She was started on Gilotrif 30 mg in 01/2018 which she did not tolerate well due to cutaneous toxicity for which dose gilotrif at reduced dose of 20mg June 14th. She is tolerating this well without return of previous skin toxicity experienced at higher dose. Pt has no new complaints. She reports no rash, nail issues have resolved. She has one loose BM daily. Weight is stable. Take a nap during day time. Reports nocturia which is not new. CT scan of the chest in Jan 2019 and 04/22/19 show stable nodule. CT of the pelvis showed hypodensity in the uterus and prominent cervix. She has since seen her gyn and everything was reportedly normal. \par No new complaints. Some nail changes- had some podiatric procedures and is completing abx, foot swelling. \par \par 8/23/19: Some nasal congestion. No rash but still nail changes. Eating well and gaining weight.\par \par 10/4/19: Pt feeling well. Returns for routine f/u and discussion of imaging findings. She offers no new complaints. She currently has a slight flare in usual great toe paronychia. No fevers. On minocycline\par \par 12/12/19: Pt returns for follow up. She is feeling great. No complaints. Tolerating  afatinib with minimal AE....mild rash well controlled with minocycline and clobestrol shampoo. Reports had recent mammo which was normal. \par \par 2/30/20: Pt c/o c/l pleuritic chest pain. Some cough but nio other complaints. Rash and GI discomfort stable. \par \par 5/8/20: Pt has gained weight. Neck is swollen and so are her ankles. Appetite is much improved. Cough has improved. She has no chest pain but she has been taking Tylenol round the clock in anticipation of pain. 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 \par \par 7/10/20: Fatigue and sleeping a lot. Tolerating medication. Has heart burn and takes OTC meds for that. Has some foot pain and is seeing a podiatrist tomorrow. Has one nail on left foot that is swollen and red. Interval decrease in size of right middle and lower lobe nodules since 2/19/2020. New indeterminate 0.5 cm nodular opacities in the left upper and lower lobes as described above. \par \par 8/4/20: Fatigue, nail and skin changes improved. Appetite decreased. Lost almost 5 kg since last visit here in the office. \par \par 9/4/20: Fatigue, anorexia has improved. Thinks she has gained weight. \par \par 10/1/2020: Ms. Mcintyre presented at the  after having her ct scans and requested to be seen for new onset of swelling and pain LLL. States she noticed it ~ 2 weeks ago and has been keeping her leg elevated for much of the time. There is no erythema. She denies fevers. She states that over the last few days she noticed that her leg feels hard to touch and is tender. No other complaints. Denies injury or fall. \par \par 10/6/20: Last visit, she was referred for doppler which detected DVT- attributes to some trauma after a large dog stepped on her toes. Started on Eliquis which she is tolerated without any issues. CT done on 10/1 showed rt main PE but there was partial response noted on tumor. \par \par 12/15/20: Pt is now on Eliquis for DVT and PE. Tolerating well. Taking Tagrisso with no issues. \par \par 3/16/21: Patient is taking osimertinib (Tagrisso) 40 mg once daily at 10 AM with food (since August /2020). Patient was previously taking 80 mg from 3/27/2020 until 7/31/2020 and was held due to side effects, 40 mg re-started on 8/4/2020. She is tolerating this well with occasional diarrhea and dry skin as the only AE. \par \par 6/11/21: Insomnia- as per family, coughs at night, pt and  not aware. \par \par 9/16/21: No complaints. Went to a dentist and was told that she needs an extraction. She is currently taking Eliquis for hx of PE. No rash or other AEs from TAgrisso\par \par  [de-identified] : pleomophic,

## 2021-09-16 NOTE — REVIEW OF SYSTEMS
[Lower Ext Edema] : lower extremity edema [Diarrhea] : diarrhea [Negative] : Allergic/Immunologic [Fatigue] : no fatigue [Recent Change In Weight] : ~T no recent weight change [Chest Pain] : no chest pain [Shortness Of Breath] : no shortness of breath [Cough] : no cough [SOB on Exertion] : no shortness of breath during exertion [FreeTextEntry4] : dental pain- left lower molar [FreeTextEntry5] : LLE improved

## 2021-09-16 NOTE — PHYSICAL EXAM
[Fully active, able to carry on all pre-disease performance without restriction] : Status 0 - Fully active, able to carry on all pre-disease performance without restriction [Normal] : affect appropriate [de-identified] : mild ankle edema [de-identified] : dry skin

## 2021-09-16 NOTE — ASSESSMENT
[Palliative] : Goals of care discussed with patient: Palliative [Palliative Care Plan] : Patient was apprised of incurable nature of disease.  Hospice and Palliative care options discussed. [FreeTextEntry1] : 70 yo w with recently diagnosed lung ca started initially on afatinib 20mg daily from Dec 2017 to August 2020 with new scans showing progression of disease\par Based on recent PET/CT and brain MRI, there was FDG-uptake in all areas noted on the scan convincing for POD. MRI shows no mets\par Peripheral blood NGS did not reveal any EGFR mut. \par She started Osimertinib 80 mg daily beginning of April 2020 at 80 mg daily which she had a hard time handling but is tolerating 40 mg well (since Feb 2021) with no AEs which she has been taking since August 2020. \par Unfortunately, had a DVT and PE as noted on scans on 10/1/20 as noted above. On Eliquis for the same. \par Pt advised to hold Eliquis for 2 days prior and up to 2 days after depending on extent of bleeding.  \par Reviewed recent blood work and scans- CEA normal last time\par Scans (last in June 2021) continues to show [place]\par Due for scans but pt going away to [place] and will do scan in December after she returns.

## 2021-09-20 ENCOUNTER — RESULT REVIEW (OUTPATIENT)
Age: 71
End: 2021-09-20

## 2021-09-20 LAB
ALBUMIN SERPL ELPH-MCNC: 4.4 G/DL
ALP BLD-CCNC: 61 U/L
ALT SERPL-CCNC: 19 U/L
ANION GAP SERPL CALC-SCNC: 11 MMOL/L
AST SERPL-CCNC: 21 U/L
BASOPHILS # BLD AUTO: 0.04 K/UL
BASOPHILS NFR BLD AUTO: 0.7 %
BILIRUB SERPL-MCNC: 0.4 MG/DL
BUN SERPL-MCNC: 30 MG/DL
CALCIUM SERPL-MCNC: 9.1 MG/DL
CHLORIDE SERPL-SCNC: 109 MMOL/L
CHOLEST SERPL-MCNC: 189 MG/DL
CO2 SERPL-SCNC: 22 MMOL/L
CREAT SERPL-MCNC: 1.18 MG/DL
EOSINOPHIL # BLD AUTO: 0.21 K/UL
EOSINOPHIL NFR BLD AUTO: 3.7 %
GLUCOSE SERPL-MCNC: 89 MG/DL
HCT VFR BLD CALC: 42.3 %
HDLC SERPL-MCNC: 43 MG/DL
HGB BLD-MCNC: 13.5 G/DL
IMM GRANULOCYTES NFR BLD AUTO: 0.2 %
LDLC SERPL CALC-MCNC: 100 MG/DL
LYMPHOCYTES # BLD AUTO: 1.77 K/UL
LYMPHOCYTES NFR BLD AUTO: 31.1 %
MAN DIFF?: NORMAL
MCHC RBC-ENTMCNC: 31.8 PG
MCHC RBC-ENTMCNC: 31.9 GM/DL
MCV RBC AUTO: 99.5 FL
MONOCYTES # BLD AUTO: 0.51 K/UL
MONOCYTES NFR BLD AUTO: 9 %
NEUTROPHILS # BLD AUTO: 3.15 K/UL
NEUTROPHILS NFR BLD AUTO: 55.3 %
NONHDLC SERPL-MCNC: 145 MG/DL
PLATELET # BLD AUTO: 214 K/UL
POTASSIUM SERPL-SCNC: 4.9 MMOL/L
PROT SERPL-MCNC: 6.9 G/DL
RBC # BLD: 4.25 M/UL
RBC # FLD: 13.6 %
SODIUM SERPL-SCNC: 142 MMOL/L
TRIGL SERPL-MCNC: 228 MG/DL
TSH SERPL-ACNC: 1.45 UIU/ML
WBC # FLD AUTO: 5.69 K/UL

## 2021-09-20 NOTE — PHYSICAL EXAM
[No Acute Distress] : no acute distress [Well-Appearing] : well-appearing [Normal Sclera/Conjunctiva] : normal sclera/conjunctiva [EOMI] : extraocular movements intact [No Respiratory Distress] : no respiratory distress  [No Accessory Muscle Use] : no accessory muscle use [Clear to Auscultation] : lungs were clear to auscultation bilaterally [Normal Rate] : normal rate  [Regular Rhythm] : with a regular rhythm [No Edema] : there was no peripheral edema [Soft] : abdomen soft [Non-distended] : non-distended [Normal Anterior Cervical Nodes] : no anterior cervical lymphadenopathy [No Spinal Tenderness] : no spinal tenderness [Grossly Normal Strength/Tone] : grossly normal strength/tone [No Rash] : no rash [Coordination Grossly Intact] : coordination grossly intact [Normal Affect] : the affect was normal

## 2021-09-20 NOTE — HISTORY OF PRESENT ILLNESS
[Time Spent: ____ minutes] : Total time spent using  services: [unfilled] minutes. The patient's primary language is not English thus required  services. [FreeTextEntry1] : f/u of HLD, BMI 30, DVT,  [Interpreters_IDNumber] : Katey  [Interpreters_FullName] : Genesis Ruffin [TWNoteComboBox1] : American Sign Language [de-identified] : Feels well.  Weight stable.  No c/o cough or SOB.  \par Reported she will be needing a dental procedure in the near future - dentist has questions about her medications.  \par RE: NSCLC, dx 12/2017, never smoker, followed by Dr Ruiz, on Tagrisso and tolerating well. weight stable.  Some AM cough with sputum. no SOB.  SOme MARCIAL with stairs but doesn’t need to stop. \par RE DVT: on Eliquis\par RE HLD: on simvastatin, no side effect\par

## 2021-09-20 NOTE — ASSESSMENT
[FreeTextEntry1] : Feels well.  Weight stable.  No c/o cough or SOB.  \par Reported she will be needing a dental procedure in the near future - dentist has questions about her medications. will d/w onc RE meds. \par RE: NSCLC, dx 12/2017, never smoker, followed by Dr Ruiz, on Tagrisso and tolerating well. weight stable.  Some AM cough with sputum. no SOB.  SOme MARCIAL with stairs but doesn’t need to stop. \par RE DVT: on Eliquis\par RE HLD: on simvastatin, no side effect\par \par discussed calcium carbonate vs citrate for calcium supplementation. \par Due for labs.

## 2021-11-12 ENCOUNTER — APPOINTMENT (OUTPATIENT)
Dept: OPHTHALMOLOGY | Facility: CLINIC | Age: 71
End: 2021-11-12
Payer: MEDICARE

## 2021-11-12 ENCOUNTER — NON-APPOINTMENT (OUTPATIENT)
Age: 71
End: 2021-11-12

## 2021-11-12 PROCEDURE — 92014 COMPRE OPH EXAM EST PT 1/>: CPT

## 2021-11-12 PROCEDURE — 76514 ECHO EXAM OF EYE THICKNESS: CPT

## 2021-11-23 ENCOUNTER — RX RENEWAL (OUTPATIENT)
Age: 71
End: 2021-11-23

## 2021-12-03 ENCOUNTER — OUTPATIENT (OUTPATIENT)
Dept: OUTPATIENT SERVICES | Facility: HOSPITAL | Age: 71
LOS: 1 days | End: 2021-12-03
Payer: MEDICARE

## 2021-12-03 ENCOUNTER — APPOINTMENT (OUTPATIENT)
Dept: CT IMAGING | Facility: IMAGING CENTER | Age: 71
End: 2021-12-03
Payer: MEDICARE

## 2021-12-03 DIAGNOSIS — C34.90 MALIGNANT NEOPLASM OF UNSPECIFIED PART OF UNSPECIFIED BRONCHUS OR LUNG: ICD-10-CM

## 2021-12-03 DIAGNOSIS — M23.91 UNSPECIFIED INTERNAL DERANGEMENT OF RIGHT KNEE: Chronic | ICD-10-CM

## 2021-12-03 DIAGNOSIS — Z98.89 OTHER SPECIFIED POSTPROCEDURAL STATES: Chronic | ICD-10-CM

## 2021-12-03 DIAGNOSIS — Z98.49 CATARACT EXTRACTION STATUS, UNSPECIFIED EYE: Chronic | ICD-10-CM

## 2021-12-03 PROCEDURE — 74160 CT ABDOMEN W/CONTRAST: CPT

## 2021-12-03 PROCEDURE — 71260 CT THORAX DX C+: CPT | Mod: 26

## 2021-12-03 PROCEDURE — 82565 ASSAY OF CREATININE: CPT

## 2021-12-03 PROCEDURE — 74160 CT ABDOMEN W/CONTRAST: CPT | Mod: 26

## 2021-12-03 PROCEDURE — 71260 CT THORAX DX C+: CPT

## 2021-12-08 ENCOUNTER — APPOINTMENT (OUTPATIENT)
Dept: CARDIOLOGY | Facility: CLINIC | Age: 71
End: 2021-12-08
Payer: MEDICARE

## 2021-12-08 VITALS
TEMPERATURE: 97.4 F | HEART RATE: 60 BPM | WEIGHT: 155 LBS | SYSTOLIC BLOOD PRESSURE: 123 MMHG | BODY MASS INDEX: 30.43 KG/M2 | OXYGEN SATURATION: 99 % | DIASTOLIC BLOOD PRESSURE: 69 MMHG | HEIGHT: 60 IN

## 2021-12-08 PROCEDURE — 99214 OFFICE O/P EST MOD 30 MIN: CPT

## 2021-12-08 NOTE — REASON FOR VISIT
[Follow-Up - Clinic] : a clinic follow-up of [FreeTextEntry2] : DVT, leg swelling [FreeTextEntry1] : 12/8/2021\par  joined by .  \par \par She remains on the eliquis 5mg \par No bleeding or bruising\par No blood in the urine or stool\par Her breathing is fine.\par Leg swelling is fine.  \par Breathing well\par here with her . \par no changes to her medicaitons.\par Has apt to see hematology in 2 weeks. \par  \par  \par

## 2021-12-08 NOTE — PHYSICAL EXAM
[General Appearance - Well Developed] : well developed [Normal Appearance] : normal appearance [Well Groomed] : well groomed [General Appearance - Well Nourished] : well nourished [No Deformities] : no deformities [General Appearance - In No Acute Distress] : no acute distress [Normal Conjunctiva] : the conjunctiva exhibited no abnormalities [Eyelids - No Xanthelasma] : the eyelids demonstrated no xanthelasmas [Normal Jugular Venous A Waves Present] : normal jugular venous A waves present [Normal Jugular Venous V Waves Present] : normal jugular venous V waves present [No Jugular Venous Mancuso A Waves] : no jugular venous mancuso A waves [Respiration, Rhythm And Depth] : normal respiratory rhythm and effort [Exaggerated Use Of Accessory Muscles For Inspiration] : no accessory muscle use [Auscultation Breath Sounds / Voice Sounds] : lungs were clear to auscultation bilaterally [Heart Rate And Rhythm] : heart rate and rhythm were normal [Heart Sounds] : normal S1 and S2 [Murmurs] : no murmurs present [Abdomen Soft] : soft [Abdomen Tenderness] : non-tender [Abdomen Mass (___ Cm)] : no abdominal mass palpated [Abnormal Walk] : normal gait [Gait - Sufficient For Exercise Testing] : the gait was sufficient for exercise testing [Skin Color & Pigmentation] : normal skin color and pigmentation [] : no rash [No Venous Stasis] : no venous stasis [Skin Lesions] : no skin lesions [No Skin Ulcers] : no skin ulcer [No Xanthoma] : no  xanthoma was observed [FreeTextEntry1] : LLE edema.  Controlled.

## 2021-12-08 NOTE — ASSESSMENT
[FreeTextEntry1] : Assessment:\par 1.  LLE DVT - extensive.  \par 2.  PE\par 3.  Lung CA\par 4.  Hearing impaired\par \par \par Plan\par 1.   venous duplex showed chronic DVT.  Will repeat scan to assure stability.  She can get this when she returns from [place] in the Spring.  \par 2.  Continue prescription compression garments knee high 15-20 mmHg\par 3.  Continue Eliquis 5mg BID for now. \par 4.  Followup with oncology\par 5.  Leg elevation, compression, time and reassurance.\par 6.  Return 6 months\par

## 2021-12-13 ENCOUNTER — NON-APPOINTMENT (OUTPATIENT)
Age: 71
End: 2021-12-13

## 2021-12-14 ENCOUNTER — APPOINTMENT (OUTPATIENT)
Dept: INTERNAL MEDICINE | Facility: CLINIC | Age: 71
End: 2021-12-14
Payer: MEDICARE

## 2021-12-14 VITALS
BODY MASS INDEX: 30.43 KG/M2 | OXYGEN SATURATION: 99 % | SYSTOLIC BLOOD PRESSURE: 120 MMHG | HEART RATE: 69 BPM | WEIGHT: 155 LBS | DIASTOLIC BLOOD PRESSURE: 80 MMHG | HEIGHT: 60 IN

## 2021-12-14 DIAGNOSIS — S63.609A UNSPECIFIED SPRAIN OF UNSPECIFIED THUMB, INITIAL ENCOUNTER: ICD-10-CM

## 2021-12-14 PROCEDURE — 99214 OFFICE O/P EST MOD 30 MIN: CPT

## 2021-12-15 ENCOUNTER — OUTPATIENT (OUTPATIENT)
Dept: OUTPATIENT SERVICES | Facility: HOSPITAL | Age: 71
LOS: 1 days | Discharge: HOME | End: 2021-12-15

## 2021-12-15 DIAGNOSIS — Z98.89 OTHER SPECIFIED POSTPROCEDURAL STATES: Chronic | ICD-10-CM

## 2021-12-15 DIAGNOSIS — C34.90 MALIGNANT NEOPLASM OF UNSPECIFIED PART OF UNSPECIFIED BRONCHUS OR LUNG: ICD-10-CM

## 2021-12-15 DIAGNOSIS — Z98.49 CATARACT EXTRACTION STATUS, UNSPECIFIED EYE: Chronic | ICD-10-CM

## 2021-12-15 DIAGNOSIS — M23.91 UNSPECIFIED INTERNAL DERANGEMENT OF RIGHT KNEE: Chronic | ICD-10-CM

## 2021-12-17 ENCOUNTER — APPOINTMENT (OUTPATIENT)
Dept: HEMATOLOGY ONCOLOGY | Facility: CLINIC | Age: 71
End: 2021-12-17

## 2021-12-19 RX ORDER — MINOCYCLINE HYDROCHLORIDE 100 MG/1
100 CAPSULE ORAL TWICE DAILY
Qty: 180 | Refills: 1 | Status: DISCONTINUED | COMMUNITY
Start: 2018-06-07 | End: 2021-12-19

## 2021-12-19 NOTE — ADDENDUM
[FreeTextEntry1] : 35 minutes spent in preparation of this visit, including review of previous notes and test results, interview and examination of patient, discussion of plan, arranging for appropriate testing and treatment, and documentation.

## 2021-12-19 NOTE — ASSESSMENT
[FreeTextEntry1] : RE thumb pain: right thumb pain w/ slight weakness and tenderness. No swelling or redness to suggest infection and no trauma. Most c/w tendinitis. Trial of topical NSAID and thumb brace/support. Went on internet to show her picture of type of brace she should get.  Patient advised to call for any worsening or if no resolution - [place].  \par \par RE chronic DVT: to remain on Eliquis, continue compression stockings/leg elevation.  f/u doppler advised for Spring 2022. \par RE NSSL: last visit with Dr MICHAELS in September. No change in meds. Due for repeat CT and planning on having when she returns from [place]. \par RE HLD: on statin - last  which is in target range. no side effects.\par \par RE medication reconciliation: reviewed meds. Discussed minocycline which she has been on for an extended period of time.  She is not even sure why she is taking. Advised she d/c minocycline - also advised d/w Onc in case there is some reason they still want her on it. \par

## 2021-12-19 NOTE — HISTORY OF PRESENT ILLNESS
[Time Spent: ____ minutes] : Total time spent using  services: [unfilled] minutes. The patient's primary language is not English thus required  services. [FreeTextEntry1] : pain at base of thumb\par also f/u of HLD, h/o DVT, BMI > 30, NSSL Ca [Interpreters_IDNumber] : Katey  [Interpreters_FullName] : Genesis Ruffin [TWNoteComboBox1] : American Sign Language [de-identified] : Chart reviewed today in preparation for visit. Since last visit, has had appts with heme/onc and CV\par \par RE CC: 2 weeks ago, noticed right thumb pain. some weakness and tender to touch. No swelling, no redness.  She does not recall any trauma but pain has persisted. No worse but not better either. \par Feels well.  Weight stable.  No c/o cough or SOB.  \par \par RE chronic DVT: to remain on Eliquis, continue compression stockings/leg elevation.  f/u doppler advised for Spring 2022. \par RE NSSL: last visit with Dr MICHAELS in September. No change in meds. Due for repeat CT and planning on having when she returns from [place]. \par RE HLD: on statin - last  which is in target range. no side effects.\par \par Prior (9/14/21)\par Feels well.  Weight stable.  No c/o cough or SOB.  \par Reported she will be needing a dental procedure in the near future - dentist has questions about her medications.  \par RE: NSCLC, dx 12/2017, never smoker, followed by Dr Ruiz, on Tagrisso and tolerating well. weight stable.  Some AM cough with sputum. no SOB.  SOme MARCIAL with stairs but doesn’t need to stop. \par RE DVT: on Eliquis\par RE HLD: on simvastatin, no side effect\par

## 2021-12-19 NOTE — PHYSICAL EXAM
[No Acute Distress] : no acute distress [Well-Appearing] : well-appearing [Normal Sclera/Conjunctiva] : normal sclera/conjunctiva [EOMI] : extraocular movements intact [No Respiratory Distress] : no respiratory distress  [Clear to Auscultation] : lungs were clear to auscultation bilaterally [Normal Rate] : normal rate  [Regular Rhythm] : with a regular rhythm [Soft] : abdomen soft [Non-distended] : non-distended [Normal Anterior Cervical Nodes] : no anterior cervical lymphadenopathy [No CVA Tenderness] : no CVA  tenderness [No Spinal Tenderness] : no spinal tenderness [No Joint Swelling] : no joint swelling [Grossly Normal Strength/Tone] : grossly normal strength/tone [No Rash] : no rash [Coordination Grossly Intact] : coordination grossly intact [No Focal Deficits] : no focal deficits [Normal Affect] : the affect was normal [Normal Insight/Judgement] : insight and judgment were intact [de-identified] : right thumb - no deformity, no erythema or edema.  Mild tenderness at base and with flexion at MP joint.

## 2021-12-23 ENCOUNTER — APPOINTMENT (OUTPATIENT)
Dept: HEMATOLOGY ONCOLOGY | Facility: CLINIC | Age: 71
End: 2021-12-23
Payer: MEDICARE

## 2021-12-23 ENCOUNTER — RESULT REVIEW (OUTPATIENT)
Age: 71
End: 2021-12-23

## 2021-12-23 VITALS
RESPIRATION RATE: 16 BRPM | HEART RATE: 72 BPM | WEIGHT: 153.99 LBS | BODY MASS INDEX: 30.08 KG/M2 | SYSTOLIC BLOOD PRESSURE: 129 MMHG | DIASTOLIC BLOOD PRESSURE: 72 MMHG | OXYGEN SATURATION: 100 % | TEMPERATURE: 98.1 F

## 2021-12-23 DIAGNOSIS — L70.8 OTHER ACNE: ICD-10-CM

## 2021-12-23 LAB
BASOPHILS # BLD AUTO: 0.04 K/UL — SIGNIFICANT CHANGE UP (ref 0–0.2)
BASOPHILS NFR BLD AUTO: 0.7 % — SIGNIFICANT CHANGE UP (ref 0–2)
EOSINOPHIL # BLD AUTO: 0.18 K/UL — SIGNIFICANT CHANGE UP (ref 0–0.5)
EOSINOPHIL NFR BLD AUTO: 3.2 % — SIGNIFICANT CHANGE UP (ref 0–6)
HCT VFR BLD CALC: 37.8 % — SIGNIFICANT CHANGE UP (ref 34.5–45)
HGB BLD-MCNC: 12.2 G/DL — SIGNIFICANT CHANGE UP (ref 11.5–15.5)
IMM GRANULOCYTES NFR BLD AUTO: 0.2 % — SIGNIFICANT CHANGE UP (ref 0–1.5)
LYMPHOCYTES # BLD AUTO: 2.38 K/UL — SIGNIFICANT CHANGE UP (ref 1–3.3)
LYMPHOCYTES # BLD AUTO: 41.8 % — SIGNIFICANT CHANGE UP (ref 13–44)
MCHC RBC-ENTMCNC: 30.3 PG — SIGNIFICANT CHANGE UP (ref 27–34)
MCHC RBC-ENTMCNC: 32.3 G/DL — SIGNIFICANT CHANGE UP (ref 32–36)
MCV RBC AUTO: 93.8 FL — SIGNIFICANT CHANGE UP (ref 80–100)
MONOCYTES # BLD AUTO: 0.57 K/UL — SIGNIFICANT CHANGE UP (ref 0–0.9)
MONOCYTES NFR BLD AUTO: 10 % — SIGNIFICANT CHANGE UP (ref 2–14)
NEUTROPHILS # BLD AUTO: 2.51 K/UL — SIGNIFICANT CHANGE UP (ref 1.8–7.4)
NEUTROPHILS NFR BLD AUTO: 44.1 % — SIGNIFICANT CHANGE UP (ref 43–77)
NRBC # BLD: 0 /100 WBCS — SIGNIFICANT CHANGE UP (ref 0–0)
PLATELET # BLD AUTO: 194 K/UL — SIGNIFICANT CHANGE UP (ref 150–400)
RBC # BLD: 4.03 M/UL — SIGNIFICANT CHANGE UP (ref 3.8–5.2)
RBC # FLD: 13.3 % — SIGNIFICANT CHANGE UP (ref 10.3–14.5)
WBC # BLD: 5.69 K/UL — SIGNIFICANT CHANGE UP (ref 3.8–10.5)
WBC # FLD AUTO: 5.69 K/UL — SIGNIFICANT CHANGE UP (ref 3.8–10.5)

## 2021-12-23 PROCEDURE — 99214 OFFICE O/P EST MOD 30 MIN: CPT

## 2021-12-23 NOTE — HISTORY OF PRESENT ILLNESS
[Disease: _____________________] : Disease: [unfilled] [T: ___] : T[unfilled] [N: ___] : N[unfilled] [M: ___] : M[unfilled] [AJCC Stage: ____] : AJCC Stage: [unfilled] [de-identified] : Ms. Mcintyre is a pleasant 71-year-old woman with recently diagnosed lung adeno ca, here for a follow up visit. \par Breif hx- Ms. 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 She was started on Gilotrif 30 mg in 01/2018 which she did not tolerate well due to cutaneous toxicity for which dose gilotrif at reduced dose of 20mg June 14th. She is tolerating this well without return of previous skin toxicity experienced at higher dose. Pt has no new complaints. She reports no rash, nail issues have resolved. She has one loose BM daily. Weight is stable. Take a nap during day time. Reports nocturia which is not new. CT scan of the chest in Jan 2019 and 04/22/19 show stable nodule. CT of the pelvis showed hypodensity in the uterus and prominent cervix. She has since seen her gyn and everything was reportedly normal. \par No new complaints. Some nail changes- had some podiatric procedures and is completing abx, foot swelling. \par \par 8/23/19: Some nasal congestion. No rash but still nail changes. Eating well and gaining weight.\par \par 10/4/19: Pt feeling well. Returns for routine f/u and discussion of imaging findings. She offers no new complaints. She currently has a slight flare in usual great toe paronychia. No fevers. On minocycline\par \par 12/12/19: Pt returns for follow up. She is feeling great. No complaints. Tolerating  afatinib with minimal AE....mild rash well controlled with minocycline and clobestrol shampoo. Reports had recent mammo which was normal. \par \par 2/30/20: Pt c/o c/l pleuritic chest pain. Some cough but nio other complaints. Rash and GI discomfort stable. \par \par 5/8/20: Pt has gained weight. Neck is swollen and so are her ankles. Appetite is much improved. Cough has improved. She has no chest pain but she has been taking Tylenol round the clock in anticipation of pain. 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 \par \par 7/10/20: Fatigue and sleeping a lot. Tolerating medication. Has heart burn and takes OTC meds for that. Has some foot pain and is seeing a podiatrist tomorrow. Has one nail on left foot that is swollen and red. Interval decrease in size of right middle and lower lobe nodules since 2/19/2020. New indeterminate 0.5 cm nodular opacities in the left upper and lower lobes as described above. \par \par 8/4/20: Fatigue, nail and skin changes improved. Appetite decreased. Lost almost 5 kg since last visit here in the office. \par \par 9/4/20: Fatigue, anorexia has improved. Thinks she has gained weight. \par \par 10/1/2020: Ms. Mcintyre presented at the  after having her ct scans and requested to be seen for new onset of swelling and pain LLL. States she noticed it ~ 2 weeks ago and has been keeping her leg elevated for much of the time. There is no erythema. She denies fevers. She states that over the last few days she noticed that her leg feels hard to touch and is tender. No other complaints. Denies injury or fall. \par \par 10/6/20: Last visit, she was referred for doppler which detected DVT- attributes to some trauma after a large dog stepped on her toes. Started on Eliquis which she is tolerated without any issues. CT done on 10/1 showed rt main PE but there was partial response noted on tumor. \par \par 12/15/20: Pt is now on Eliquis for DVT and PE. Tolerating well. Taking Tagrisso with no issues. \par \par 3/16/21: Patient is taking osimertinib (Tagrisso) 40 mg once daily at 10 AM with food (since August /2020). Patient was previously taking 80 mg from 3/27/2020 until 7/31/2020 and was held due to side effects, 40 mg re-started on 8/4/2020. She is tolerating this well with occasional diarrhea and dry skin as the only AE. \par \par 6/11/21: Insomnia- as per family, coughs at night, pt and  not aware. \par \par 9/16/21: No complaints. Went to a dentist and was told that she needs an extraction. She is currently taking Eliquis for hx of PE. No rash or other AEs from TAgrisso\par \par 12/23/21: toe nail changes. No other complaints. \par \par  [de-identified] : pleomophic,

## 2021-12-23 NOTE — REVIEW OF SYSTEMS
[Lower Ext Edema] : lower extremity edema [Diarrhea] : diarrhea [Negative] : Allergic/Immunologic [Fatigue] : no fatigue [Recent Change In Weight] : ~T no recent weight change [Chest Pain] : no chest pain [Shortness Of Breath] : no shortness of breath [Cough] : no cough [SOB on Exertion] : no shortness of breath during exertion [FreeTextEntry4] : dental pain- left lower molar [FreeTextEntry5] : LLE improved [de-identified] : nail changes in toes

## 2021-12-23 NOTE — PHYSICAL EXAM
[Fully active, able to carry on all pre-disease performance without restriction] : Status 0 - Fully active, able to carry on all pre-disease performance without restriction [Normal] : affect appropriate [de-identified] : mild ankle edema [de-identified] : dry skin, paronychia great toes b/l, hypopigmented lesions on UE

## 2021-12-23 NOTE — ASSESSMENT
[Palliative] : Goals of care discussed with patient: Palliative [Palliative Care Plan] : Patient was apprised of incurable nature of disease.  Hospice and Palliative care options discussed. [FreeTextEntry1] : 70 yo w with recently diagnosed lung ca started initially on afatinib 20mg daily from Dec 2017 to August 2020 with new scans showing progression of disease\par Based on recent PET/CT and brain MRI, there was FDG-uptake in all areas noted on the scan convincing for POD. MRI shows no mets\par Peripheral blood NGS did not reveal any EGFR mut. \par She started Osimertinib 80 mg daily beginning of April 2020 at 80 mg daily which she had a hard time handling but is tolerating 40 mg well (since Feb 2021) with no AEs which she has been taking since August 2020. \par Unfortunately, had a DVT and PE as noted on scans on 10/1/20 as noted above. On Eliquis for the same. \par Scans (last in December 2021) continues to show AR\par Nail and skin care discussed. \par OV in 3 months. \par Skin changes- see derm\par Labs today\par

## 2022-01-05 LAB
ALBUMIN SERPL ELPH-MCNC: 4.2 G/DL
ALP BLD-CCNC: 57 U/L
ALT SERPL-CCNC: 13 U/L
ANION GAP SERPL CALC-SCNC: 13 MMOL/L
AST SERPL-CCNC: 19 U/L
BILIRUB SERPL-MCNC: 0.2 MG/DL
BUN SERPL-MCNC: 27 MG/DL
CALCIUM SERPL-MCNC: 8.8 MG/DL
CEA SERPL-MCNC: 2.9 NG/ML
CHLORIDE SERPL-SCNC: 106 MMOL/L
CO2 SERPL-SCNC: 22 MMOL/L
CREAT SERPL-MCNC: 1.22 MG/DL
GLUCOSE SERPL-MCNC: 111 MG/DL
MAGNESIUM SERPL-MCNC: 2.1 MG/DL
POTASSIUM SERPL-SCNC: 3.9 MMOL/L
PROT SERPL-MCNC: 6.8 G/DL
SODIUM SERPL-SCNC: 140 MMOL/L
TSH SERPL-ACNC: 1.47 UIU/ML

## 2022-01-18 ENCOUNTER — NON-APPOINTMENT (OUTPATIENT)
Age: 72
End: 2022-01-18

## 2022-01-20 DIAGNOSIS — R91.1 SOLITARY PULMONARY NODULE: ICD-10-CM

## 2022-02-10 ENCOUNTER — RESULT REVIEW (OUTPATIENT)
Age: 72
End: 2022-02-10

## 2022-03-16 ENCOUNTER — APPOINTMENT (OUTPATIENT)
Dept: CARDIOLOGY | Facility: CLINIC | Age: 72
End: 2022-03-16
Payer: MEDICARE

## 2022-03-16 VITALS
OXYGEN SATURATION: 98 % | HEIGHT: 60 IN | BODY MASS INDEX: 31.41 KG/M2 | WEIGHT: 160 LBS | HEART RATE: 64 BPM | SYSTOLIC BLOOD PRESSURE: 121 MMHG | TEMPERATURE: 97.3 F | DIASTOLIC BLOOD PRESSURE: 68 MMHG

## 2022-03-16 PROCEDURE — 99214 OFFICE O/P EST MOD 30 MIN: CPT

## 2022-03-16 NOTE — REASON FOR VISIT
[Follow-Up - Clinic] : a clinic follow-up of [FreeTextEntry2] : DVT, leg swelling [FreeTextEntry1] : 3/16/2022\par  joined by .  \par \par She remains on the eliquis 5mg \par No bleeding or bruising\par No blood in the urine or stool\par Her breathing is fine.\par Leg swelling is fine.  She is wearing compression garments at times.  \par Breathing well\par Plans to travel to [place] In April \par  \par  \par

## 2022-03-16 NOTE — PHYSICAL EXAM
[General Appearance - Well Developed] : well developed [Normal Appearance] : normal appearance [Well Groomed] : well groomed [General Appearance - Well Nourished] : well nourished [No Deformities] : no deformities [General Appearance - In No Acute Distress] : no acute distress [Normal Conjunctiva] : the conjunctiva exhibited no abnormalities [Eyelids - No Xanthelasma] : the eyelids demonstrated no xanthelasmas [Normal Jugular Venous A Waves Present] : normal jugular venous A waves present [Normal Jugular Venous V Waves Present] : normal jugular venous V waves present [No Jugular Venous Mancuso A Waves] : no jugular venous mnacuso A waves [Respiration, Rhythm And Depth] : normal respiratory rhythm and effort [Exaggerated Use Of Accessory Muscles For Inspiration] : no accessory muscle use [Auscultation Breath Sounds / Voice Sounds] : lungs were clear to auscultation bilaterally [Heart Rate And Rhythm] : heart rate and rhythm were normal [Heart Sounds] : normal S1 and S2 [Murmurs] : no murmurs present [Abdomen Soft] : soft [Abdomen Tenderness] : non-tender [Abdomen Mass (___ Cm)] : no abdominal mass palpated [Abnormal Walk] : normal gait [Gait - Sufficient For Exercise Testing] : the gait was sufficient for exercise testing [Skin Color & Pigmentation] : normal skin color and pigmentation [] : no rash [No Venous Stasis] : no venous stasis [Skin Lesions] : no skin lesions [No Skin Ulcers] : no skin ulcer [No Xanthoma] : no  xanthoma was observed [FreeTextEntry1] : LLE edema.  Controlled.

## 2022-03-16 NOTE — ASSESSMENT
[FreeTextEntry1] : Assessment:\par 1.  LLE DVT - extensive.  \par 2.  PE\par 3.  Lung CA\par 4.  Hearing impaired\par \par \par Plan\par 1.   venous duplex showed chronic DVT.  Will repeat scan April 2022 to assure stability.  \par 2.  Continue prescription compression garments knee high 15-20 mmHg\par 3.  Continue Eliquis 5mg BID for now  - refill sent. \par 4.  Followup with oncology\par 5.  Leg elevation, compression, time and reassurance.\par 6.  Return 6 months\par

## 2022-03-17 ENCOUNTER — APPOINTMENT (OUTPATIENT)
Dept: CT IMAGING | Facility: IMAGING CENTER | Age: 72
End: 2022-03-17
Payer: MEDICARE

## 2022-03-17 ENCOUNTER — OUTPATIENT (OUTPATIENT)
Dept: OUTPATIENT SERVICES | Facility: HOSPITAL | Age: 72
LOS: 1 days | End: 2022-03-17
Payer: MEDICARE

## 2022-03-17 DIAGNOSIS — Z98.89 OTHER SPECIFIED POSTPROCEDURAL STATES: Chronic | ICD-10-CM

## 2022-03-17 DIAGNOSIS — C34.90 MALIGNANT NEOPLASM OF UNSPECIFIED PART OF UNSPECIFIED BRONCHUS OR LUNG: ICD-10-CM

## 2022-03-17 DIAGNOSIS — M23.91 UNSPECIFIED INTERNAL DERANGEMENT OF RIGHT KNEE: Chronic | ICD-10-CM

## 2022-03-17 DIAGNOSIS — Z98.49 CATARACT EXTRACTION STATUS, UNSPECIFIED EYE: Chronic | ICD-10-CM

## 2022-03-17 PROCEDURE — 71260 CT THORAX DX C+: CPT | Mod: 26,MG

## 2022-03-17 PROCEDURE — 71260 CT THORAX DX C+: CPT | Mod: MG

## 2022-03-17 PROCEDURE — 82565 ASSAY OF CREATININE: CPT

## 2022-03-17 PROCEDURE — 74160 CT ABDOMEN W/CONTRAST: CPT | Mod: 26,MG

## 2022-03-17 PROCEDURE — G1004: CPT

## 2022-03-17 PROCEDURE — 74160 CT ABDOMEN W/CONTRAST: CPT | Mod: MG

## 2022-03-28 ENCOUNTER — OUTPATIENT (OUTPATIENT)
Dept: OUTPATIENT SERVICES | Facility: HOSPITAL | Age: 72
LOS: 1 days | Discharge: HOME | End: 2022-03-28

## 2022-03-28 DIAGNOSIS — Z98.89 OTHER SPECIFIED POSTPROCEDURAL STATES: Chronic | ICD-10-CM

## 2022-03-28 DIAGNOSIS — Z98.49 CATARACT EXTRACTION STATUS, UNSPECIFIED EYE: Chronic | ICD-10-CM

## 2022-03-28 DIAGNOSIS — C34.90 MALIGNANT NEOPLASM OF UNSPECIFIED PART OF UNSPECIFIED BRONCHUS OR LUNG: ICD-10-CM

## 2022-03-28 DIAGNOSIS — M23.91 UNSPECIFIED INTERNAL DERANGEMENT OF RIGHT KNEE: Chronic | ICD-10-CM

## 2022-03-29 ENCOUNTER — RESULT REVIEW (OUTPATIENT)
Age: 72
End: 2022-03-29

## 2022-03-29 ENCOUNTER — APPOINTMENT (OUTPATIENT)
Dept: HEMATOLOGY ONCOLOGY | Facility: CLINIC | Age: 72
End: 2022-03-29
Payer: MEDICARE

## 2022-03-29 VITALS
OXYGEN SATURATION: 9 % | BODY MASS INDEX: 31.43 KG/M2 | WEIGHT: 160.94 LBS | RESPIRATION RATE: 16 BRPM | DIASTOLIC BLOOD PRESSURE: 77 MMHG | SYSTOLIC BLOOD PRESSURE: 134 MMHG | HEART RATE: 65 BPM | TEMPERATURE: 97 F

## 2022-03-29 LAB
BASOPHILS # BLD AUTO: 0.05 K/UL — SIGNIFICANT CHANGE UP (ref 0–0.2)
BASOPHILS NFR BLD AUTO: 0.8 % — SIGNIFICANT CHANGE UP (ref 0–2)
EOSINOPHIL # BLD AUTO: 0.22 K/UL — SIGNIFICANT CHANGE UP (ref 0–0.5)
EOSINOPHIL NFR BLD AUTO: 3.4 % — SIGNIFICANT CHANGE UP (ref 0–6)
HCT VFR BLD CALC: 40.1 % — SIGNIFICANT CHANGE UP (ref 34.5–45)
HGB BLD-MCNC: 13.1 G/DL — SIGNIFICANT CHANGE UP (ref 11.5–15.5)
IMM GRANULOCYTES NFR BLD AUTO: 0.2 % — SIGNIFICANT CHANGE UP (ref 0–1.5)
LYMPHOCYTES # BLD AUTO: 2.61 K/UL — SIGNIFICANT CHANGE UP (ref 1–3.3)
LYMPHOCYTES # BLD AUTO: 39.8 % — SIGNIFICANT CHANGE UP (ref 13–44)
MCHC RBC-ENTMCNC: 30.8 PG — SIGNIFICANT CHANGE UP (ref 27–34)
MCHC RBC-ENTMCNC: 32.7 G/DL — SIGNIFICANT CHANGE UP (ref 32–36)
MCV RBC AUTO: 94.4 FL — SIGNIFICANT CHANGE UP (ref 80–100)
MONOCYTES # BLD AUTO: 0.57 K/UL — SIGNIFICANT CHANGE UP (ref 0–0.9)
MONOCYTES NFR BLD AUTO: 8.7 % — SIGNIFICANT CHANGE UP (ref 2–14)
NEUTROPHILS # BLD AUTO: 3.1 K/UL — SIGNIFICANT CHANGE UP (ref 1.8–7.4)
NEUTROPHILS NFR BLD AUTO: 47.1 % — SIGNIFICANT CHANGE UP (ref 43–77)
NRBC # BLD: 0 /100 WBCS — SIGNIFICANT CHANGE UP (ref 0–0)
PLATELET # BLD AUTO: 202 K/UL — SIGNIFICANT CHANGE UP (ref 150–400)
RBC # BLD: 4.25 M/UL — SIGNIFICANT CHANGE UP (ref 3.8–5.2)
RBC # FLD: 14 % — SIGNIFICANT CHANGE UP (ref 10.3–14.5)
WBC # BLD: 6.56 K/UL — SIGNIFICANT CHANGE UP (ref 3.8–10.5)
WBC # FLD AUTO: 6.56 K/UL — SIGNIFICANT CHANGE UP (ref 3.8–10.5)

## 2022-03-29 PROCEDURE — 99215 OFFICE O/P EST HI 40 MIN: CPT

## 2022-03-31 NOTE — HISTORY OF PRESENT ILLNESS
[Disease: _____________________] : Disease: [unfilled] [T: ___] : T[unfilled] [N: ___] : N[unfilled] [M: ___] : M[unfilled] [AJCC Stage: ____] : AJCC Stage: [unfilled] [de-identified] : Ms. Mcintyre is a pleasant 71-year-old woman with recently diagnosed lung adeno ca, here for a follow up visit. \par Breif hx- Ms. 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 She was started on Gilotrif 30 mg in 01/2018 which she did not tolerate well due to cutaneous toxicity for which dose gilotrif at reduced dose of 20mg June 14th. She is tolerating this well without return of previous skin toxicity experienced at higher dose. Pt has no new complaints. She reports no rash, nail issues have resolved. She has one loose BM daily. Weight is stable. Take a nap during day time. Reports nocturia which is not new. CT scan of the chest in Jan 2019 and 04/22/19 show stable nodule. CT of the pelvis showed hypodensity in the uterus and prominent cervix. She has since seen her gyn and everything was reportedly normal. \par No new complaints. Some nail changes- had some podiatric procedures and is completing abx, foot swelling. \par \par 8/23/19: Some nasal congestion. No rash but still nail changes. Eating well and gaining weight.\par \par 10/4/19: Pt feeling well. Returns for routine f/u and discussion of imaging findings. She offers no new complaints. She currently has a slight flare in usual great toe paronychia. No fevers. On minocycline\par \par 12/12/19: Pt returns for follow up. She is feeling great. No complaints. Tolerating  afatinib with minimal AE....mild rash well controlled with minocycline and clobestrol shampoo. Reports had recent mammo which was normal. \par \par 2/30/20: Pt c/o c/l pleuritic chest pain. Some cough but nio other complaints. Rash and GI discomfort stable. \par \par 5/8/20: Pt has gained weight. Neck is swollen and so are her ankles. Appetite is much improved. Cough has improved. She has no chest pain but she has been taking Tylenol round the clock in anticipation of pain. 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 \par \par 7/10/20: Fatigue and sleeping a lot. Tolerating medication. Has heart burn and takes OTC meds for that. Has some foot pain and is seeing a podiatrist tomorrow. Has one nail on left foot that is swollen and red. Interval decrease in size of right middle and lower lobe nodules since 2/19/2020. New indeterminate 0.5 cm nodular opacities in the left upper and lower lobes as described above. \par \par 8/4/20: Fatigue, nail and skin changes improved. Appetite decreased. Lost almost 5 kg since last visit here in the office. \par \par 9/4/20: Fatigue, anorexia has improved. Thinks she has gained weight. \par \par 10/1/2020: Ms. Mcintrye presented at the  after having her ct scans and requested to be seen for new onset of swelling and pain LLL. States she noticed it ~ 2 weeks ago and has been keeping her leg elevated for much of the time. There is no erythema. She denies fevers. She states that over the last few days she noticed that her leg feels hard to touch and is tender. No other complaints. Denies injury or fall. \par \par 10/6/20: Last visit, she was referred for doppler which detected DVT- attributes to some trauma after a large dog stepped on her toes. Started on Eliquis which she is tolerated without any issues. CT done on 10/1 showed rt main PE but there was partial response noted on tumor. \par \par 12/15/20: Pt is now on Eliquis for DVT and PE. Tolerating well. Taking Tagrisso with no issues. \par \par 3/16/21: Patient is taking osimertinib (Tagrisso) 40 mg once daily at 10 AM with food (since August /2020). Patient was previously taking 80 mg from 3/27/2020 until 7/31/2020 and was held due to side effects, 40 mg re-started on 8/4/2020. She is tolerating this well with occasional diarrhea and dry skin as the only AE. \par \par 6/11/21: Insomnia- as per family, coughs at night, pt and  not aware. \par \par 9/16/21: No complaints. Went to a dentist and was told that she needs an extraction. She is currently taking Eliquis for hx of PE. No rash or other AEs from TAgrisso\par \par 12/23/21: toe nail changes. No other complaints. \par \par 3/29/22: Presents states toe nail changes are stable/improving. Patient with night time acid reflux symptoms. Denies any rash, skin changes, denies any nausea, vomiting, diarrhea, abdominal pain otherwise. Denies any chest pain or shortness of breath. \par \par  [de-identified] : pleomophic,

## 2022-03-31 NOTE — REASON FOR VISIT
[Spouse] : spouse [[employer] ] : provided by [employer]   [Time Spent: ____ minutes] : Total time spent using  services: [unfilled] minutes. The patient's primary language is not English thus required  services. [Follow-Up Visit] : a follow-up [FreeTextEntry2] : lung cancer [Interpreters_IDNumber] : [id number] [Interpreters_FullName] : Zoraida

## 2022-03-31 NOTE — ASSESSMENT
[FreeTextEntry1] : 72 yo w with recently diagnosed lung ca started initially on afatinib 20mg daily from Dec 2017 to August 2020 with new scans showing progression of disease\par Based on recent PET/CT and brain MRI, there was FDG-uptake in all areas noted on the scan convincing for POD. MRI shows no mets\par Peripheral blood NGS did not reveal any EGFR mut. \par She started Osimertinib 80 mg daily beginning of April 2020 at 80 mg daily which she had a hard time handling but is tolerating 40 mg well (since Feb 2021) with no AEs which she has been taking since August 2020. \par Unfortunately, had a DVT and PE as noted on scans on 10/1/20 as noted above. On Eliquis for the same. F/u with Vascular cardiology on 03/22 will c/w eliquis w/ repeat LE US. \par Scans (last in December 2021) continues to show NC\par Scans from 03/2022 with possible interval increase in spiculated lung mass however from personal read mass looks unchanged will not change treatment and repeat scans for end of Aug (Aug 29th) and f/u. \par Nail and skin care discussed. \par Omeprazole prescribed for GERD can trial for a month and f/u with PCP regarding taper or continuation, advised to take omeprazole 12 hrs spaced from tagrisso to prevent interaction of medications. Tagrisso refilled. \par OV in 09/2022. \par Labs today\par \par Plan d/w Dr. Ruiz, patient, and patient's family member in office. \par I was with the resident physician, Dr. Pope for the entire visit and agree with above documentation\par

## 2022-04-01 ENCOUNTER — RESULT REVIEW (OUTPATIENT)
Age: 72
End: 2022-04-01

## 2022-04-01 ENCOUNTER — APPOINTMENT (OUTPATIENT)
Dept: ULTRASOUND IMAGING | Facility: HOSPITAL | Age: 72
End: 2022-04-01

## 2022-04-01 ENCOUNTER — OUTPATIENT (OUTPATIENT)
Dept: OUTPATIENT SERVICES | Facility: HOSPITAL | Age: 72
LOS: 1 days | End: 2022-04-01
Payer: MEDICARE

## 2022-04-01 DIAGNOSIS — M23.91 UNSPECIFIED INTERNAL DERANGEMENT OF RIGHT KNEE: Chronic | ICD-10-CM

## 2022-04-01 DIAGNOSIS — Z98.89 OTHER SPECIFIED POSTPROCEDURAL STATES: Chronic | ICD-10-CM

## 2022-04-01 DIAGNOSIS — Z00.00 ENCOUNTER FOR GENERAL ADULT MEDICAL EXAMINATION WITHOUT ABNORMAL FINDINGS: ICD-10-CM

## 2022-04-01 DIAGNOSIS — Z98.49 CATARACT EXTRACTION STATUS, UNSPECIFIED EYE: Chronic | ICD-10-CM

## 2022-04-01 DIAGNOSIS — I87.2 VENOUS INSUFFICIENCY (CHRONIC) (PERIPHERAL): ICD-10-CM

## 2022-04-01 PROCEDURE — 93970 EXTREMITY STUDY: CPT

## 2022-04-01 PROCEDURE — 93970 EXTREMITY STUDY: CPT | Mod: 26

## 2022-04-05 ENCOUNTER — APPOINTMENT (OUTPATIENT)
Dept: INTERNAL MEDICINE | Facility: CLINIC | Age: 72
End: 2022-04-05
Payer: MEDICARE

## 2022-04-05 VITALS
OXYGEN SATURATION: 98 % | SYSTOLIC BLOOD PRESSURE: 122 MMHG | BODY MASS INDEX: 32.2 KG/M2 | HEIGHT: 60 IN | WEIGHT: 164 LBS | HEART RATE: 70 BPM | DIASTOLIC BLOOD PRESSURE: 70 MMHG

## 2022-04-05 DIAGNOSIS — K21.9 GASTRO-ESOPHAGEAL REFLUX DISEASE W/OUT ESOPHAGITIS: ICD-10-CM

## 2022-04-05 DIAGNOSIS — M79.645 PAIN IN LEFT FINGER(S): ICD-10-CM

## 2022-04-05 PROCEDURE — 99214 OFFICE O/P EST MOD 30 MIN: CPT

## 2022-04-09 LAB
ALBUMIN SERPL ELPH-MCNC: 4.2 G/DL
ALP BLD-CCNC: 70 U/L
ALT SERPL-CCNC: 15 U/L
ANION GAP SERPL CALC-SCNC: 13 MMOL/L
AST SERPL-CCNC: 20 U/L
BILIRUB SERPL-MCNC: 0.2 MG/DL
BUN SERPL-MCNC: 25 MG/DL
CALCIUM SERPL-MCNC: 9.1 MG/DL
CEA SERPL-MCNC: 4.4 NG/ML
CHLORIDE SERPL-SCNC: 109 MMOL/L
CO2 SERPL-SCNC: 21 MMOL/L
CREAT SERPL-MCNC: 1.24 MG/DL
EGFR: 46 ML/MIN/1.73M2
GLUCOSE SERPL-MCNC: 98 MG/DL
MAGNESIUM SERPL-MCNC: 2.2 MG/DL
PHOSPHATE SERPL-MCNC: 3.9 MG/DL
POTASSIUM SERPL-SCNC: 4.9 MMOL/L
PROT SERPL-MCNC: 7 G/DL
SODIUM SERPL-SCNC: 143 MMOL/L
TSH SERPL-ACNC: 1.98 UIU/ML

## 2022-04-16 PROBLEM — K21.9 CHRONIC GERD: Status: ACTIVE | Noted: 2020-01-06

## 2022-04-16 NOTE — ASSESSMENT
[FreeTextEntry1] : 71 yo F with h/o NSS lung ca, HLD, GERD, DVT/PE, \par \par recently seen by both heme/onc and [facility] cardiologist. \par \par RE NSSL: No change in care for lung Ca, was started on PPI by martha for sx of GERD, \par RE DVT/PE; follows with Dr Oh - s/p LE doppler - chronic DVT - to remain on eliquis as per cards. \par Lab work just done by Martha - \par RE thumb pain: suspect tendonitis - referring to hand specialist however will not have time to schedule as she is leaving for [place] tomorrow. She will try to find hand specialist in [place].

## 2022-04-16 NOTE — ADDENDUM
[FreeTextEntry1] : 32 minutes spent in preparation of this visit, including review of previous notes and test results, interview and examination of patient, discussion of plan, arranging for appropriate testing and treatment, and documentation.

## 2022-04-16 NOTE — HISTORY OF PRESENT ILLNESS
[Time Spent: ____ minutes] : Total time spent using  services: [unfilled] minutes. The patient's primary language is not English thus required  services. [FreeTextEntry1] : new onset thumb pain\par f/u of chronic medical issues [Interpreters_IDNumber] : [facility] [Interpreters_FullName] : Maris Murphy [TWNoteComboBox1] : American Sign Language [de-identified] : Chart reviewed today in preparation for visit. \par \par 71 yo F with h/o NSS lung ca, HLD, GERD, DVT/PE, \par recently seen by both heme/onc and [facility] cardiologist.  \par No change in care for lung Ca, was started on PPI by [facility] for sx of GERD, reports sx improved with PPI\par to remain on eliquis as per cards. \par Lab work just done by Heme - \par overall reports feeling well however has been having pain at base of left thumb and also distal soft tissue nodule \par \par Bought a house in [facility] - very excited about the move\par \par 12/14/22\par RE CC: 2 weeks ago, noticed right thumb pain. some weakness and tender to touch. No swelling, no redness.  She does not recall any trauma but pain has persisted. No worse but not better either. \par Feels well.  Weight stable.  No c/o cough or SOB.  \par \par RE chronic DVT: to remain on Eliquis, continue compression stockings/leg elevation.  f/u doppler advised for Spring 2022. \par RE NSSL: last visit with Dr MICHAELS in September. No change in meds. Due for repeat CT and planning on having when she returns from [place]. \par RE HLD: on statin - last  which is in target range. no side effects.\par \par Prior (9/14/21)\par Feels well.  Weight stable.  No c/o cough or SOB.  \par Reported she will be needing a dental procedure in the near future - dentist has questions about her medications.  \par RE: NSCLC, dx 12/2017, never smoker, followed by Dr Ruiz, on Tagrisso and tolerating well. weight stable.  Some AM cough with sputum. no SOB.  SOme MARCIAL with stairs but doesn’t need to stop. \par RE DVT: on Eliquis\par RE HLD: on simvastatin, no side effect\par

## 2022-04-16 NOTE — PHYSICAL EXAM
[No Acute Distress] : no acute distress [Well-Appearing] : well-appearing [Normal Sclera/Conjunctiva] : normal sclera/conjunctiva [EOMI] : extraocular movements intact [No Respiratory Distress] : no respiratory distress  [Clear to Auscultation] : lungs were clear to auscultation bilaterally [Normal Rate] : normal rate  [Regular Rhythm] : with a regular rhythm [Normal S1, S2] : normal S1 and S2 [Soft] : abdomen soft [Non Tender] : non-tender [No CVA Tenderness] : no CVA  tenderness [No Spinal Tenderness] : no spinal tenderness [Grossly Normal Strength/Tone] : grossly normal strength/tone [No Rash] : no rash [No Focal Deficits] : no focal deficits [Normal Affect] : the affect was normal

## 2022-04-25 NOTE — PHYSICAL EXAM
[Fully active, able to carry on all pre-disease performance without restriction] : Status 0 - Fully active, able to carry on all pre-disease performance without restriction [Normal] : affect appropriate [de-identified] : congenital deafness- communicates with sign language. Some facial rash/skin thickening, hypertrichosis [de-identified] : neck fullness, facial edema [de-identified] : Breathing comfortably [de-identified] : facial rash resolved, L great toe paronychia also resolved, no skin rash DISPLAY PLAN FREE TEXT

## 2022-08-10 ENCOUNTER — APPOINTMENT (OUTPATIENT)
Dept: CARDIOLOGY | Facility: CLINIC | Age: 72
End: 2022-08-10

## 2022-08-10 VITALS
HEIGHT: 60 IN | SYSTOLIC BLOOD PRESSURE: 133 MMHG | TEMPERATURE: 96.3 F | DIASTOLIC BLOOD PRESSURE: 69 MMHG | BODY MASS INDEX: 31.41 KG/M2 | OXYGEN SATURATION: 99 % | HEART RATE: 71 BPM | WEIGHT: 160 LBS

## 2022-08-10 DIAGNOSIS — I87.2 VENOUS INSUFFICIENCY (CHRONIC) (PERIPHERAL): ICD-10-CM

## 2022-08-10 PROCEDURE — 99213 OFFICE O/P EST LOW 20 MIN: CPT

## 2022-08-10 RX ORDER — APIXABAN 5 MG/1
5 TABLET, FILM COATED ORAL
Qty: 180 | Refills: 2 | Status: ACTIVE | COMMUNITY
Start: 2021-03-16 | End: 1900-01-01

## 2022-08-10 NOTE — REASON FOR VISIT
[Follow-Up - Clinic] : a clinic follow-up of [FreeTextEntry2] : DVT, leg swelling [FreeTextEntry1] : 8/10/2022\par  joined by .  \par \par She remains on the eliquis 5mg \par No bleeding or bruising\par No blood in the urine or stool\par Her breathing is fine.\par Leg swelling is fine.  She is wearing compression garments at times.  \par Breathing well\par Plans to travel to [place] \par  \par  \par

## 2022-08-10 NOTE — ASSESSMENT
[FreeTextEntry1] : Assessment:\par 1.  LLE DVT - extensive.  \par 2.  PE\par 3.  Lung CA\par 4.  Hearing impaired\par \par \par Plan\par 1.  venous duplex showed chronic DVT.  Will repeat scan in Spring of 2023 to assure stability.  \par 2.  Continue prescription compression garments knee high 15-20 mmHg\par 3.  Continue Eliquis 5mg BID for now  - refill sent. \par 4.  Followup with oncology\par 5.  Leg elevation, compression, time and reassurance.\par 6.  Return Spring of 2023\par

## 2022-08-10 NOTE — PHYSICAL EXAM
[General Appearance - Well Developed] : well developed [Normal Appearance] : normal appearance [Well Groomed] : well groomed [General Appearance - Well Nourished] : well nourished [No Deformities] : no deformities [General Appearance - In No Acute Distress] : no acute distress [Normal Conjunctiva] : the conjunctiva exhibited no abnormalities [Eyelids - No Xanthelasma] : the eyelids demonstrated no xanthelasmas [Normal Jugular Venous A Waves Present] : normal jugular venous A waves present [Normal Jugular Venous V Waves Present] : normal jugular venous V waves present [No Jugular Venous Mancuso A Waves] : no jugular venous mancuso A waves [Respiration, Rhythm And Depth] : normal respiratory rhythm and effort [Exaggerated Use Of Accessory Muscles For Inspiration] : no accessory muscle use [Auscultation Breath Sounds / Voice Sounds] : lungs were clear to auscultation bilaterally [Heart Rate And Rhythm] : heart rate and rhythm were normal [Heart Sounds] : normal S1 and S2 [Murmurs] : no murmurs present [Abdomen Soft] : soft [Abdomen Tenderness] : non-tender [Abdomen Mass (___ Cm)] : no abdominal mass palpated [Abnormal Walk] : normal gait [Gait - Sufficient For Exercise Testing] : the gait was sufficient for exercise testing [FreeTextEntry1] : LLE edema.  Controlled.  [Skin Color & Pigmentation] : normal skin color and pigmentation [] : no rash [No Venous Stasis] : no venous stasis [Skin Lesions] : no skin lesions [No Skin Ulcers] : no skin ulcer [No Xanthoma] : no  xanthoma was observed

## 2022-08-15 ENCOUNTER — APPOINTMENT (OUTPATIENT)
Dept: OPHTHALMOLOGY | Facility: CLINIC | Age: 72
End: 2022-08-15

## 2022-08-17 ENCOUNTER — NON-APPOINTMENT (OUTPATIENT)
Age: 72
End: 2022-08-17

## 2022-08-17 ENCOUNTER — APPOINTMENT (OUTPATIENT)
Dept: OPHTHALMOLOGY | Facility: CLINIC | Age: 72
End: 2022-08-17

## 2022-08-17 PROCEDURE — 76514 ECHO EXAM OF EYE THICKNESS: CPT

## 2022-08-17 PROCEDURE — 92012 INTRM OPH EXAM EST PATIENT: CPT

## 2022-08-23 ENCOUNTER — NON-APPOINTMENT (OUTPATIENT)
Age: 72
End: 2022-08-23

## 2022-08-23 ENCOUNTER — APPOINTMENT (OUTPATIENT)
Dept: INTERNAL MEDICINE | Facility: CLINIC | Age: 72
End: 2022-08-23

## 2022-08-23 VITALS
OXYGEN SATURATION: 98 % | WEIGHT: 162 LBS | HEIGHT: 60 IN | HEART RATE: 64 BPM | SYSTOLIC BLOOD PRESSURE: 140 MMHG | BODY MASS INDEX: 31.8 KG/M2 | DIASTOLIC BLOOD PRESSURE: 76 MMHG

## 2022-08-23 DIAGNOSIS — I82.409 ACUTE EMBOLISM AND THROMBOSIS OF UNSPECIFIED DEEP VEINS OF UNSPECIFIED LOWER EXTREMITY: ICD-10-CM

## 2022-08-23 DIAGNOSIS — E78.5 HYPERLIPIDEMIA, UNSPECIFIED: ICD-10-CM

## 2022-08-23 DIAGNOSIS — R07.89 OTHER CHEST PAIN: ICD-10-CM

## 2022-08-23 DIAGNOSIS — Z00.00 ENCOUNTER FOR GENERAL ADULT MEDICAL EXAMINATION W/OUT ABNORMAL FINDINGS: ICD-10-CM

## 2022-08-23 DIAGNOSIS — R00.1 BRADYCARDIA, UNSPECIFIED: ICD-10-CM

## 2022-08-23 DIAGNOSIS — Z12.39 ENCOUNTER FOR OTHER SCREENING FOR MALIGNANT NEOPLASM OF BREAST: ICD-10-CM

## 2022-08-23 PROCEDURE — G0442 ANNUAL ALCOHOL SCREEN 15 MIN: CPT

## 2022-08-23 PROCEDURE — G0439: CPT

## 2022-08-23 PROCEDURE — G0444 DEPRESSION SCREEN ANNUAL: CPT

## 2022-08-27 PROBLEM — I82.409 DVT (DEEP VENOUS THROMBOSIS): Status: ACTIVE | Noted: 2020-10-01

## 2022-08-27 PROBLEM — R07.89 CHEST PAIN, MUSCULOSKELETAL: Status: ACTIVE | Noted: 2018-01-02

## 2022-08-27 NOTE — HEALTH RISK ASSESSMENT
[No Retinopathy] : No retinopathy [Patient reported bone density results were normal] : Patient reported bone density results were normal [Patient reported colonoscopy was normal] : Patient reported colonoscopy was normal [Never] : Never [Monthly or less (1 pt)] : Monthly or less (1 point) [1 or 2 (0 pts)] : 1 or 2 (0 points) [No falls in past year] : Patient reported no falls in the past year [0] : 2) Feeling down, depressed, or hopeless: Not at all (0) [PHQ-2 Negative - No further assessment needed] : PHQ-2 Negative - No further assessment needed [Patient reported mammogram was normal] : Patient reported mammogram was normal [HIV test declined] : HIV test declined [Hepatitis C test declined] : Hepatitis C test declined [None] : None [With Family] : lives with family [Retired] : retired [] :  [Fully functional (bathing, dressing, toileting, transferring, walking, feeding)] : Fully functional (bathing, dressing, toileting, transferring, walking, feeding) [Fully functional (using the telephone, shopping, preparing meals, housekeeping, doing laundry, using] : Fully functional and needs no help or supervision to perform IADLs (using the telephone, shopping, preparing meals, housekeeping, doing laundry, using transportation, managing medications and managing finances) [Smoke Detector] : smoke detector [Carbon Monoxide Detector] : carbon monoxide detector [Seat Belt] :  uses seat belt [With Patient/Caregiver] : , with patient/caregiver [Designated Healthcare Proxy] : Designated healthcare proxy [Yes] : Yes [Relationship: ___] : Relationship: [unfilled] [Audit-CScore] : 1 [de-identified] : walking, does all her own housework [de-identified] : regular, varied [FSO7Exqtm] : 0 [EyeExamDate] : 08/22 [Change in mental status noted] : No change in mental status noted [Reports changes in vision] : Reports no changes in vision [MammogramDate] : 01/21 [PapSmearComments] : NA [BoneDensityDate] : 08/21 [ColonoscopyDate] : 02/16 [de-identified] : deaf [AdvancecareDate] : 08/22

## 2022-08-27 NOTE — ASSESSMENT
[FreeTextEntry1] : 73 yo F with h/o NSS lung ca, HLD, GERD, DVT/PE, BMI 31\par \par RE occ LBP:  paraspinal area - no red flags. topical NSAID prn\par Right posterior rib pain: already scheduled for CT scan chest in one week and to see oncology in two weeks. Be sure to inform oncologist about the rib pain.\par RE NSC lung Ca: most recent visit 3/22  - has f/u as above\par RE h/o DVT/PE with chronic DVT: seen by [facility] 8/22 - to remain on eliquis. \par RE HLD: on statin with no side effects\par GERD: on PPI (started by hematologist)\par \par Annual alcohol screen completed this visit. Alcohol use within healthy limits.  Healthy drinking guidelines shared with patient (Male no more than 4 SSD on any day, no more than 14 SSD per week; Female and male overage 65 no more than 3 SSD on any day, no more than 7 per week). Positive reinforcement provided given patient currently within healthy guidelines. \par \par Annual depression screen completed this visit and reviewed.  Screening not suggestive of diagnosis of MDD. \par \par Discussed advanced directives, Health Care Proxy, and goals of care during visit. \par \par f/u 3 months

## 2022-08-27 NOTE — HISTORY OF PRESENT ILLNESS
[de-identified] : 71 yo F with h/o NSS lung ca, HLD, GERD, DVT/PE, BMI 31\par \par interval hx: since last ivsit has seen ophtho, and [facility] cardiology\par some OA - back and fingers - occ - resolves with advil\par Has occ LBP - paraspinal area - only hurts when she first gets up - then resolves. no fever, sensory changes, incontinence, focal weakness. \par Also having some right posterior rib pain. no  trauma. hurts when she presses. no SOB, no cough, no pain with deep inspiration. She has CT scan scheduled for 9/1/22 and then f/u with Onc one week later.\par RE NSC lung Ca: most recent visit 3/22  - has regular f/u\par RE h/o DVT/PE with chronic DVT: seen by [facility] 8/22 - to remain on eliquis. \par \par Bought a house and is now living in [facility]\par RE HLD: on statin with no side effects\par GERD: on PPI (started by hematologist)

## 2022-08-27 NOTE — DISCUSSION/SUMMARY
[Subsequent Annual Wellness] : Subsequent Annual Wellness Visit [Preventive Exam & Counseling Completed] : with preventive exam as well as age and risk appropriate counseling completed [EKG] : EKG recommended [Healthy Diet] : counseling was given on maintaining a healthy diet [Blood Glucose] : due for blood glucose [Screening Mammogram] : due for a screening mammogram [Calcium and Vitamin D Intake] : counseling was given on obtaining adequate amounts of calcium and vitamin D on a daily basis [Screening Not Indicated] : screening not indicated [Screening Current] : screening is current [Patient Declines] : the patient declines screening [Influenza Recommended Annually] : influenza vaccination is recommended annually [Lifetime Vaccine Completed] : the lifetime pneumococcal vaccine has been completed [Complete and Up to Date] : complete and up to date [Follow-Up in ___] : follow-up visit needed in [unfilled]

## 2022-08-27 NOTE — PHYSICAL EXAM
[No Acute Distress] : no acute distress [Well-Appearing] : well-appearing [Normal Sclera/Conjunctiva] : normal sclera/conjunctiva [PERRL] : pupils equal round and reactive to light [EOMI] : extraocular movements intact [No Lymphadenopathy] : no lymphadenopathy [Supple] : supple [No Respiratory Distress] : no respiratory distress  [Clear to Auscultation] : lungs were clear to auscultation bilaterally [Normal Rate] : normal rate  [Regular Rhythm] : with a regular rhythm [Normal S1, S2] : normal S1 and S2 [Pedal Pulses Present] : the pedal pulses are present [Normal Appearance] : normal in appearance [No Masses] : no palpable masses [No Axillary Lymphadenopathy] : no axillary lymphadenopathy [Soft] : abdomen soft [Non Tender] : non-tender [Normal Bowel Sounds] : normal bowel sounds [Normal Axillary Nodes] : no axillary lymphadenopathy [Normal Anterior Cervical Nodes] : no anterior cervical lymphadenopathy [No CVA Tenderness] : no CVA  tenderness [No Spinal Tenderness] : no spinal tenderness [No Joint Swelling] : no joint swelling [Grossly Normal Strength/Tone] : grossly normal strength/tone [No Rash] : no rash [No Focal Deficits] : no focal deficits [Deep Tendon Reflexes (DTR)] : deep tendon reflexes were 2+ and symmetric [Normal Affect] : the affect was normal [de-identified] : compression stockings on [de-identified] : right rear rib tenderness.

## 2022-08-27 NOTE — INTERPRETER SERVICES
[Time Spent: ____ minutes] : Total time spent using  services: [unfilled] minutes. The patient's primary language is not English thus required  services. [Interpreters_IDNumber] : [facility] - [id number] [Interpreters_FullName] : Марина Brooks [TWNoteComboBox1] : American Sign Language

## 2022-08-28 ENCOUNTER — TRANSCRIPTION ENCOUNTER (OUTPATIENT)
Age: 72
End: 2022-08-28

## 2022-08-28 LAB
ALBUMIN SERPL ELPH-MCNC: 4.2 G/DL
ALP BLD-CCNC: 67 U/L
ALT SERPL-CCNC: 10 U/L
ANION GAP SERPL CALC-SCNC: 12 MMOL/L
AST SERPL-CCNC: 16 U/L
BILIRUB SERPL-MCNC: 0.2 MG/DL
BUN SERPL-MCNC: 25 MG/DL
CALCIUM SERPL-MCNC: 8.9 MG/DL
CHLORIDE SERPL-SCNC: 108 MMOL/L
CHOLEST SERPL-MCNC: 168 MG/DL
CO2 SERPL-SCNC: 22 MMOL/L
CREAT SERPL-MCNC: 1.07 MG/DL
EGFR: 55 ML/MIN/1.73M2
ESTIMATED AVERAGE GLUCOSE: 117 MG/DL
GLUCOSE SERPL-MCNC: 94 MG/DL
HBA1C MFR BLD HPLC: 5.7 %
HDLC SERPL-MCNC: 45 MG/DL
LDLC SERPL CALC-MCNC: 91 MG/DL
NONHDLC SERPL-MCNC: 123 MG/DL
POTASSIUM SERPL-SCNC: 4.3 MMOL/L
PROT SERPL-MCNC: 6.7 G/DL
SODIUM SERPL-SCNC: 141 MMOL/L
TRIGL SERPL-MCNC: 163 MG/DL

## 2022-08-29 ENCOUNTER — OUTPATIENT (OUTPATIENT)
Dept: OUTPATIENT SERVICES | Facility: HOSPITAL | Age: 72
LOS: 1 days | End: 2022-08-29
Payer: MEDICARE

## 2022-08-29 ENCOUNTER — APPOINTMENT (OUTPATIENT)
Dept: CT IMAGING | Facility: IMAGING CENTER | Age: 72
End: 2022-08-29

## 2022-08-29 DIAGNOSIS — Z98.49 CATARACT EXTRACTION STATUS, UNSPECIFIED EYE: Chronic | ICD-10-CM

## 2022-08-29 DIAGNOSIS — Z00.8 ENCOUNTER FOR OTHER GENERAL EXAMINATION: ICD-10-CM

## 2022-08-29 DIAGNOSIS — C34.90 MALIGNANT NEOPLASM OF UNSPECIFIED PART OF UNSPECIFIED BRONCHUS OR LUNG: ICD-10-CM

## 2022-08-29 DIAGNOSIS — Z98.89 OTHER SPECIFIED POSTPROCEDURAL STATES: Chronic | ICD-10-CM

## 2022-08-29 DIAGNOSIS — M23.91 UNSPECIFIED INTERNAL DERANGEMENT OF RIGHT KNEE: Chronic | ICD-10-CM

## 2022-08-29 PROCEDURE — 71260 CT THORAX DX C+: CPT | Mod: 26

## 2022-08-29 PROCEDURE — 74177 CT ABD & PELVIS W/CONTRAST: CPT

## 2022-08-29 PROCEDURE — 74177 CT ABD & PELVIS W/CONTRAST: CPT | Mod: 26

## 2022-08-29 PROCEDURE — 71260 CT THORAX DX C+: CPT

## 2022-08-31 ENCOUNTER — APPOINTMENT (OUTPATIENT)
Dept: MAMMOGRAPHY | Facility: IMAGING CENTER | Age: 72
End: 2022-08-31

## 2022-08-31 ENCOUNTER — OUTPATIENT (OUTPATIENT)
Dept: OUTPATIENT SERVICES | Facility: HOSPITAL | Age: 72
LOS: 1 days | End: 2022-08-31
Payer: MEDICARE

## 2022-08-31 ENCOUNTER — RESULT REVIEW (OUTPATIENT)
Age: 72
End: 2022-08-31

## 2022-08-31 DIAGNOSIS — M23.91 UNSPECIFIED INTERNAL DERANGEMENT OF RIGHT KNEE: Chronic | ICD-10-CM

## 2022-08-31 DIAGNOSIS — Z98.89 OTHER SPECIFIED POSTPROCEDURAL STATES: Chronic | ICD-10-CM

## 2022-08-31 DIAGNOSIS — Z98.49 CATARACT EXTRACTION STATUS, UNSPECIFIED EYE: Chronic | ICD-10-CM

## 2022-08-31 DIAGNOSIS — Z12.39 ENCOUNTER FOR OTHER SCREENING FOR MALIGNANT NEOPLASM OF BREAST: ICD-10-CM

## 2022-08-31 DIAGNOSIS — Z00.8 ENCOUNTER FOR OTHER GENERAL EXAMINATION: ICD-10-CM

## 2022-08-31 PROCEDURE — 77063 BREAST TOMOSYNTHESIS BI: CPT

## 2022-08-31 PROCEDURE — 77067 SCR MAMMO BI INCL CAD: CPT | Mod: 26

## 2022-08-31 PROCEDURE — 77063 BREAST TOMOSYNTHESIS BI: CPT | Mod: 26

## 2022-08-31 PROCEDURE — 77067 SCR MAMMO BI INCL CAD: CPT

## 2022-09-06 ENCOUNTER — OUTPATIENT (OUTPATIENT)
Dept: OUTPATIENT SERVICES | Facility: HOSPITAL | Age: 72
LOS: 1 days | Discharge: HOME | End: 2022-09-06

## 2022-09-06 DIAGNOSIS — C34.90 MALIGNANT NEOPLASM OF UNSPECIFIED PART OF UNSPECIFIED BRONCHUS OR LUNG: ICD-10-CM

## 2022-09-06 DIAGNOSIS — Z98.89 OTHER SPECIFIED POSTPROCEDURAL STATES: Chronic | ICD-10-CM

## 2022-09-06 DIAGNOSIS — Z98.49 CATARACT EXTRACTION STATUS, UNSPECIFIED EYE: Chronic | ICD-10-CM

## 2022-09-06 DIAGNOSIS — M23.91 UNSPECIFIED INTERNAL DERANGEMENT OF RIGHT KNEE: Chronic | ICD-10-CM

## 2022-09-08 ENCOUNTER — RESULT REVIEW (OUTPATIENT)
Age: 72
End: 2022-09-08

## 2022-09-08 ENCOUNTER — APPOINTMENT (OUTPATIENT)
Dept: HEMATOLOGY ONCOLOGY | Facility: CLINIC | Age: 72
End: 2022-09-08

## 2022-09-08 VITALS
HEIGHT: 60 IN | BODY MASS INDEX: 31.81 KG/M2 | TEMPERATURE: 97.3 F | OXYGEN SATURATION: 99 % | SYSTOLIC BLOOD PRESSURE: 124 MMHG | HEART RATE: 68 BPM | RESPIRATION RATE: 16 BRPM | DIASTOLIC BLOOD PRESSURE: 72 MMHG | WEIGHT: 162.04 LBS

## 2022-09-08 DIAGNOSIS — C34.90 MALIGNANT NEOPLASM OF UNSPECIFIED PART OF UNSPECIFIED BRONCHUS OR LUNG: ICD-10-CM

## 2022-09-08 DIAGNOSIS — R07.81 PLEURODYNIA: ICD-10-CM

## 2022-09-08 LAB
ALBUMIN SERPL ELPH-MCNC: 4.2 G/DL
ALP BLD-CCNC: 63 U/L
ALT SERPL-CCNC: 12 U/L
ANION GAP SERPL CALC-SCNC: 12 MMOL/L
AST SERPL-CCNC: 17 U/L
BASOPHILS # BLD AUTO: 0.04 K/UL — SIGNIFICANT CHANGE UP (ref 0–0.2)
BASOPHILS NFR BLD AUTO: 0.7 % — SIGNIFICANT CHANGE UP (ref 0–2)
BILIRUB SERPL-MCNC: 0.3 MG/DL
BUN SERPL-MCNC: 27 MG/DL
CALCIUM SERPL-MCNC: 8.9 MG/DL
CEA SERPL-MCNC: 7 NG/ML
CHLORIDE SERPL-SCNC: 108 MMOL/L
CO2 SERPL-SCNC: 22 MMOL/L
CREAT SERPL-MCNC: 1.22 MG/DL
EGFR: 47 ML/MIN/1.73M2
EOSINOPHIL # BLD AUTO: 0.26 K/UL — SIGNIFICANT CHANGE UP (ref 0–0.5)
EOSINOPHIL NFR BLD AUTO: 4.4 % — SIGNIFICANT CHANGE UP (ref 0–6)
GLUCOSE SERPL-MCNC: 105 MG/DL
HCT VFR BLD CALC: 35.4 % — SIGNIFICANT CHANGE UP (ref 34.5–45)
HGB BLD-MCNC: 11.5 G/DL — SIGNIFICANT CHANGE UP (ref 11.5–15.5)
IMM GRANULOCYTES NFR BLD AUTO: 0.2 % — SIGNIFICANT CHANGE UP (ref 0–1.5)
LYMPHOCYTES # BLD AUTO: 2.13 K/UL — SIGNIFICANT CHANGE UP (ref 1–3.3)
LYMPHOCYTES # BLD AUTO: 36.1 % — SIGNIFICANT CHANGE UP (ref 13–44)
MAGNESIUM SERPL-MCNC: 2.1 MG/DL
MCHC RBC-ENTMCNC: 29.8 PG — SIGNIFICANT CHANGE UP (ref 27–34)
MCHC RBC-ENTMCNC: 32.5 G/DL — SIGNIFICANT CHANGE UP (ref 32–36)
MCV RBC AUTO: 91.7 FL — SIGNIFICANT CHANGE UP (ref 80–100)
MONOCYTES # BLD AUTO: 0.62 K/UL — SIGNIFICANT CHANGE UP (ref 0–0.9)
MONOCYTES NFR BLD AUTO: 10.5 % — SIGNIFICANT CHANGE UP (ref 2–14)
NEUTROPHILS # BLD AUTO: 2.84 K/UL — SIGNIFICANT CHANGE UP (ref 1.8–7.4)
NEUTROPHILS NFR BLD AUTO: 48.1 % — SIGNIFICANT CHANGE UP (ref 43–77)
NRBC # BLD: 0 /100 WBCS — SIGNIFICANT CHANGE UP (ref 0–0)
PLATELET # BLD AUTO: 232 K/UL — SIGNIFICANT CHANGE UP (ref 150–400)
POTASSIUM SERPL-SCNC: 4.1 MMOL/L
PROT SERPL-MCNC: 7.1 G/DL
RBC # BLD: 3.86 M/UL — SIGNIFICANT CHANGE UP (ref 3.8–5.2)
RBC # FLD: 14.1 % — SIGNIFICANT CHANGE UP (ref 10.3–14.5)
SODIUM SERPL-SCNC: 142 MMOL/L
TSH SERPL-ACNC: 2.28 UIU/ML
WBC # BLD: 5.9 K/UL — SIGNIFICANT CHANGE UP (ref 3.8–10.5)
WBC # FLD AUTO: 5.9 K/UL — SIGNIFICANT CHANGE UP (ref 3.8–10.5)

## 2022-09-08 PROCEDURE — 99215 OFFICE O/P EST HI 40 MIN: CPT

## 2022-09-08 NOTE — REASON FOR VISIT
[Follow-Up Visit] : a follow-up [Spouse] : spouse [[employer] ] : provided by [employer]   [FreeTextEntry2] : lung cancer [Interpreters_IDNumber] : [id number] [Interpreters_FullName] : Zoraida

## 2022-09-08 NOTE — HISTORY OF PRESENT ILLNESS
[Disease: _____________________] : Disease: [unfilled] [T: ___] : T[unfilled] [N: ___] : N[unfilled] [M: ___] : M[unfilled] [AJCC Stage: ____] : AJCC Stage: [unfilled] [de-identified] : Ms. Mcintyre is a pleasant 72-year-old woman with recently diagnosed lung adeno ca, here for a follow up visit. \par Breif hx- Ms. 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 She was started on Gilotrif 30 mg in 01/2018 which she did not tolerate well due to cutaneous toxicity for which dose gilotrif at reduced dose of 20mg June 14th. She is tolerating this well without return of previous skin toxicity experienced at higher dose. Pt has no new complaints. She reports no rash, nail issues have resolved. She has one loose BM daily. Weight is stable. Take a nap during day time. Reports nocturia which is not new. CT scan of the chest in Jan 2019 and 04/22/19 show stable nodule. CT of the pelvis showed hypodensity in the uterus and prominent cervix. She has since seen her gyn and everything was reportedly normal. \par No new complaints. Some nail changes- had some podiatric procedures and is completing abx, foot swelling. \par \par 8/23/19: Some nasal congestion. No rash but still nail changes. Eating well and gaining weight.\par \par 10/4/19: Pt feeling well. Returns for routine f/u and discussion of imaging findings. She offers no new complaints. She currently has a slight flare in usual great toe paronychia. No fevers. On minocycline\par \par 12/12/19: Pt returns for follow up. She is feeling great. No complaints. Tolerating  afatinib with minimal AE....mild rash well controlled with minocycline and clobestrol shampoo. Reports had recent mammo which was normal. \par \par 2/30/20: Pt c/o c/l pleuritic chest pain. Some cough but nio other complaints. Rash and GI discomfort stable. \par \par 5/8/20: Pt has gained weight. Neck is swollen and so are her ankles. Appetite is much improved. Cough has improved. She has no chest pain but she has been taking Tylenol round the clock in anticipation of pain. 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 \par \par 7/10/20: Fatigue and sleeping a lot. Tolerating medication. Has heart burn and takes OTC meds for that. Has some foot pain and is seeing a podiatrist tomorrow. Has one nail on left foot that is swollen and red. Interval decrease in size of right middle and lower lobe nodules since 2/19/2020. New indeterminate 0.5 cm nodular opacities in the left upper and lower lobes as described above. \par \par 8/4/20: Fatigue, nail and skin changes improved. Appetite decreased. Lost almost 5 kg since last visit here in the office. \par \par 9/4/20: Fatigue, anorexia has improved. Thinks she has gained weight. \par \par 10/1/2020: Ms. Mcintyre presented at the  after having her ct scans and requested to be seen for new onset of swelling and pain LLL. States she noticed it ~ 2 weeks ago and has been keeping her leg elevated for much of the time. There is no erythema. She denies fevers. She states that over the last few days she noticed that her leg feels hard to touch and is tender. No other complaints. Denies injury or fall. \par \par 10/6/20: Last visit, she was referred for doppler which detected DVT- attributes to some trauma after a large dog stepped on her toes. Started on Eliquis which she is tolerated without any issues. CT done on 10/1 showed rt main PE but there was partial response noted on tumor. \par \par 12/15/20: Pt is now on Eliquis for DVT and PE. Tolerating well. Taking Tagrisso with no issues. \par \par 3/16/21: Patient is taking osimertinib (Tagrisso) 40 mg once daily at 10 AM with food (since August /2020). Patient was previously taking 80 mg from 3/27/2020 until 7/31/2020 and was held due to side effects, 40 mg re-started on 8/4/2020. She is tolerating this well with occasional diarrhea and dry skin as the only AE. \par \par 6/11/21: Insomnia- as per family, coughs at night, pt and  not aware. \par \par 9/16/21: No complaints. Went to a dentist and was told that she needs an extraction. She is currently taking Eliquis for hx of PE. No rash or other AEs from TAgrisso\par \par 12/23/21: toe nail changes. No other complaints. \par \par 3/29/22: Presents states toe nail changes are stable/improving. Patient with night time acid reflux symptoms. Denies any rash, skin changes, denies any nausea, vomiting, diarrhea, abdominal pain otherwise. Denies any chest pain or shortness of breath. \par \par 9/8/22: pt reports some globus sensation, especially to solids. Believes she has increased heart burn. Using compression stockings for LE swelling, No nail or skin changes. NOtes increased hair loss. \par  [de-identified] : pleomophic,

## 2022-09-08 NOTE — ASSESSMENT
[FreeTextEntry1] : 73 yo w with recently diagnosed lung ca started initially on afatinib 20mg daily from Dec 2017 to August 2020 with new scans showing progression of disease\par Based on recent PET/CT and brain MRI, there was FDG-uptake in all areas noted on the scan convincing for POD. MRI shows no mets\par Peripheral blood NGS did not reveal any EGFR mut. \par She started Osimertinib 80 mg daily beginning of April 2020 at 80 mg daily which she had a hard time handling but is tolerating 40 mg well (since Feb 2021) with no AEs which she has been taking since August 2020. \par Unfortunately, had a DVT and PE as noted on scans on 10/1/20 as noted above. On Eliquis for the same. F/u with Vascular cardiology on 03/22 will c/w eliquis w/ repeat LE US. \par Scans (last in December 2021) continues to show LA\par Scans from 03/2022 with possible interval increase in spiculated lung mass however from personal read mass looks unchanged will not change treatment and repeat scans for end of Aug (Aug 29th) and f/u. \par New scans from September 2022 reviewed independently- shows stable rt lung mass, by document measurement larger but stable to my eye. \par Will present at [facility]. There may be a role for SBRT\par Omeprazole prescribed for GERD can trial for a month and f/u with PCP regarding taper or continuation, advised to take omeprazole 12 hrs spaced from tagrisso to prevent interaction of medications. Tagrisso refilled. \par Pt plans to travel to [place] for winter. \par Follow up with GI for endoscopy and colonoscopy\par Also refer for swallow evaluation\par Will return in 3 months for visit.

## 2022-09-11 ENCOUNTER — RX RENEWAL (OUTPATIENT)
Age: 72
End: 2022-09-11

## 2022-09-11 RX ORDER — DICLOFENAC SODIUM 1% 10 MG/G
1 GEL TOPICAL
Qty: 100 | Refills: 0 | Status: ACTIVE | COMMUNITY
Start: 2021-12-14 | End: 1900-01-01

## 2022-09-13 ENCOUNTER — RX RENEWAL (OUTPATIENT)
Age: 72
End: 2022-09-13

## 2022-12-04 ENCOUNTER — OUTPATIENT (OUTPATIENT)
Dept: OUTPATIENT SERVICES | Facility: HOSPITAL | Age: 72
LOS: 1 days | Discharge: HOME | End: 2022-12-04

## 2022-12-04 DIAGNOSIS — Z98.89 OTHER SPECIFIED POSTPROCEDURAL STATES: Chronic | ICD-10-CM

## 2022-12-04 DIAGNOSIS — Z98.49 CATARACT EXTRACTION STATUS, UNSPECIFIED EYE: Chronic | ICD-10-CM

## 2022-12-04 DIAGNOSIS — M23.91 UNSPECIFIED INTERNAL DERANGEMENT OF RIGHT KNEE: Chronic | ICD-10-CM

## 2022-12-04 DIAGNOSIS — C34.90 MALIGNANT NEOPLASM OF UNSPECIFIED PART OF UNSPECIFIED BRONCHUS OR LUNG: ICD-10-CM

## 2022-12-06 ENCOUNTER — RESULT REVIEW (OUTPATIENT)
Age: 72
End: 2022-12-06

## 2022-12-06 ENCOUNTER — OUTPATIENT (OUTPATIENT)
Dept: OUTPATIENT SERVICES | Facility: HOSPITAL | Age: 72
LOS: 1 days | End: 2022-12-06
Payer: MEDICARE

## 2022-12-06 ENCOUNTER — APPOINTMENT (OUTPATIENT)
Dept: NUCLEAR MEDICINE | Facility: IMAGING CENTER | Age: 72
End: 2022-12-06

## 2022-12-06 ENCOUNTER — APPOINTMENT (OUTPATIENT)
Dept: HEMATOLOGY ONCOLOGY | Facility: CLINIC | Age: 72
End: 2022-12-06

## 2022-12-06 VITALS
DIASTOLIC BLOOD PRESSURE: 69 MMHG | HEART RATE: 71 BPM | RESPIRATION RATE: 16 BRPM | WEIGHT: 160.94 LBS | OXYGEN SATURATION: 100 % | SYSTOLIC BLOOD PRESSURE: 123 MMHG | TEMPERATURE: 97.9 F | HEIGHT: 60 IN | BODY MASS INDEX: 31.6 KG/M2

## 2022-12-06 DIAGNOSIS — C34.90 MALIGNANT NEOPLASM OF UNSPECIFIED PART OF UNSPECIFIED BRONCHUS OR LUNG: ICD-10-CM

## 2022-12-06 DIAGNOSIS — L27.0 GENERALIZED SKIN ERUPTION DUE TO DRUGS AND MEDICAMENTS TAKEN INTERNALLY: ICD-10-CM

## 2022-12-06 DIAGNOSIS — Z98.89 OTHER SPECIFIED POSTPROCEDURAL STATES: Chronic | ICD-10-CM

## 2022-12-06 DIAGNOSIS — Z00.8 ENCOUNTER FOR OTHER GENERAL EXAMINATION: ICD-10-CM

## 2022-12-06 DIAGNOSIS — Z98.49 CATARACT EXTRACTION STATUS, UNSPECIFIED EYE: Chronic | ICD-10-CM

## 2022-12-06 DIAGNOSIS — M23.91 UNSPECIFIED INTERNAL DERANGEMENT OF RIGHT KNEE: Chronic | ICD-10-CM

## 2022-12-06 LAB
BASOPHILS # BLD AUTO: 0.04 K/UL — SIGNIFICANT CHANGE UP (ref 0–0.2)
BASOPHILS NFR BLD AUTO: 0.6 % — SIGNIFICANT CHANGE UP (ref 0–2)
EOSINOPHIL # BLD AUTO: 0.23 K/UL — SIGNIFICANT CHANGE UP (ref 0–0.5)
EOSINOPHIL NFR BLD AUTO: 3.4 % — SIGNIFICANT CHANGE UP (ref 0–6)
HCT VFR BLD CALC: 29.8 % — LOW (ref 34.5–45)
HGB BLD-MCNC: 9.2 G/DL — LOW (ref 11.5–15.5)
IMM GRANULOCYTES NFR BLD AUTO: 0.1 % — SIGNIFICANT CHANGE UP (ref 0–0.9)
LYMPHOCYTES # BLD AUTO: 2.92 K/UL — SIGNIFICANT CHANGE UP (ref 1–3.3)
LYMPHOCYTES # BLD AUTO: 42.9 % — SIGNIFICANT CHANGE UP (ref 13–44)
MCHC RBC-ENTMCNC: 26.7 PG — LOW (ref 27–34)
MCHC RBC-ENTMCNC: 30.9 G/DL — LOW (ref 32–36)
MCV RBC AUTO: 86.4 FL — SIGNIFICANT CHANGE UP (ref 80–100)
MONOCYTES # BLD AUTO: 0.84 K/UL — SIGNIFICANT CHANGE UP (ref 0–0.9)
MONOCYTES NFR BLD AUTO: 12.4 % — SIGNIFICANT CHANGE UP (ref 2–14)
NEUTROPHILS # BLD AUTO: 2.76 K/UL — SIGNIFICANT CHANGE UP (ref 1.8–7.4)
NEUTROPHILS NFR BLD AUTO: 40.6 % — LOW (ref 43–77)
NRBC # BLD: 0 /100 WBCS — SIGNIFICANT CHANGE UP (ref 0–0)
PLATELET # BLD AUTO: 276 K/UL — SIGNIFICANT CHANGE UP (ref 150–400)
RBC # BLD: 3.45 M/UL — LOW (ref 3.8–5.2)
RBC # FLD: 15.4 % — HIGH (ref 10.3–14.5)
WBC # BLD: 6.8 K/UL — SIGNIFICANT CHANGE UP (ref 3.8–10.5)
WBC # FLD AUTO: 6.8 K/UL — SIGNIFICANT CHANGE UP (ref 3.8–10.5)

## 2022-12-06 PROCEDURE — 78815 PET IMAGE W/CT SKULL-THIGH: CPT | Mod: 26,PS

## 2022-12-06 PROCEDURE — A9552: CPT

## 2022-12-06 PROCEDURE — 99215 OFFICE O/P EST HI 40 MIN: CPT

## 2022-12-06 PROCEDURE — 78815 PET IMAGE W/CT SKULL-THIGH: CPT

## 2022-12-07 LAB
ALBUMIN SERPL ELPH-MCNC: 4.3 G/DL
ALP BLD-CCNC: 64 U/L
ALT SERPL-CCNC: 10 U/L
ANION GAP SERPL CALC-SCNC: 12 MMOL/L
AST SERPL-CCNC: 16 U/L
BASOPHILS # BLD AUTO: 0.05 K/UL
BASOPHILS NFR BLD AUTO: 0.8 %
BILIRUB SERPL-MCNC: 0.2 MG/DL
BUN SERPL-MCNC: 30 MG/DL
CALCIUM SERPL-MCNC: 8.6 MG/DL
CEA SERPL-MCNC: 9.5 NG/ML
CHLORIDE SERPL-SCNC: 108 MMOL/L
CHOLEST SERPL-MCNC: 169 MG/DL
CO2 SERPL-SCNC: 20 MMOL/L
CREAT SERPL-MCNC: 1.05 MG/DL
EGFR: 56 ML/MIN/1.73M2
EOSINOPHIL # BLD AUTO: 0.2 K/UL
EOSINOPHIL NFR BLD AUTO: 3 %
ESTIMATED AVERAGE GLUCOSE: 117 MG/DL
GLUCOSE SERPL-MCNC: 98 MG/DL
HBA1C MFR BLD HPLC: 5.7 %
HCT VFR BLD CALC: 29.8 %
HDLC SERPL-MCNC: 54 MG/DL
HGB BLD-MCNC: 9 G/DL
IMM GRANULOCYTES NFR BLD AUTO: 0 %
LDLC SERPL CALC-MCNC: 87 MG/DL
LYMPHOCYTES # BLD AUTO: 2.91 K/UL
LYMPHOCYTES NFR BLD AUTO: 43.7 %
MAGNESIUM SERPL-MCNC: 2 MG/DL
MAN DIFF?: NORMAL
MCHC RBC-ENTMCNC: 26.8 PG
MCHC RBC-ENTMCNC: 30.2 GM/DL
MCV RBC AUTO: 88.7 FL
MONOCYTES # BLD AUTO: 0.8 K/UL
MONOCYTES NFR BLD AUTO: 12 %
NEUTROPHILS # BLD AUTO: 2.7 K/UL
NEUTROPHILS NFR BLD AUTO: 40.5 %
NONHDLC SERPL-MCNC: 115 MG/DL
PLATELET # BLD AUTO: 306 K/UL
POTASSIUM SERPL-SCNC: 4.2 MMOL/L
PROT SERPL-MCNC: 6.8 G/DL
RBC # BLD: 3.36 M/UL
RBC # FLD: 15.8 %
SODIUM SERPL-SCNC: 141 MMOL/L
TRIGL SERPL-MCNC: 143 MG/DL
TSH SERPL-ACNC: 2.01 UIU/ML
WBC # FLD AUTO: 6.66 K/UL

## 2022-12-11 PROBLEM — C34.90 MALIGNANT NEOPLASM OF LUNG ASSOCIATED WITH MUTATION IN EGFR GENE: Status: ACTIVE | Noted: 2017-12-29

## 2022-12-11 PROBLEM — L27.0 DRUG-INDUCED SKIN RASH: Status: ACTIVE | Noted: 2018-01-25

## 2022-12-11 NOTE — HISTORY OF PRESENT ILLNESS
[Disease: _____________________] : Disease: [unfilled] [T: ___] : T[unfilled] [N: ___] : N[unfilled] [M: ___] : M[unfilled] [AJCC Stage: ____] : AJCC Stage: [unfilled] [de-identified] : Ms. Mcintyre is a pleasant 72-year-old woman with recently diagnosed lung adeno ca, here for a follow up visit. \par Breif hx- Ms. 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 She was started on Gilotrif 30 mg in 01/2018 which she did not tolerate well due to cutaneous toxicity for which dose gilotrif at reduced dose of 20mg June 14th. She is tolerating this well without return of previous skin toxicity experienced at higher dose. Pt has no new complaints. She reports no rash, nail issues have resolved. She has one loose BM daily. Weight is stable. Take a nap during day time. Reports nocturia which is not new. CT scan of the chest in Jan 2019 and 04/22/19 show stable nodule. CT of the pelvis showed hypodensity in the uterus and prominent cervix. She has since seen her gyn and everything was reportedly normal. \par No new complaints. Some nail changes- had some podiatric procedures and is completing abx, foot swelling. \par \par 8/23/19: Some nasal congestion. No rash but still nail changes. Eating well and gaining weight.\par \par 10/4/19: Pt feeling well. Returns for routine f/u and discussion of imaging findings. She offers no new complaints. She currently has a slight flare in usual great toe paronychia. No fevers. On minocycline\par \par 12/12/19: Pt returns for follow up. She is feeling great. No complaints. Tolerating  afatinib with minimal AE....mild rash well controlled with minocycline and clobestrol shampoo. Reports had recent mammo which was normal. \par \par 2/30/20: Pt c/o c/l pleuritic chest pain. Some cough but nio other complaints. Rash and GI discomfort stable. \par \par 5/8/20: Pt has gained weight. Neck is swollen and so are her ankles. Appetite is much improved. Cough has improved. She has no chest pain but she has been taking Tylenol round the clock in anticipation of pain. 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 \par \par 7/10/20: Fatigue and sleeping a lot. Tolerating medication. Has heart burn and takes OTC meds for that. Has some foot pain and is seeing a podiatrist tomorrow. Has one nail on left foot that is swollen and red. Interval decrease in size of right middle and lower lobe nodules since 2/19/2020. New indeterminate 0.5 cm nodular opacities in the left upper and lower lobes as described above. \par \par 8/4/20: Fatigue, nail and skin changes improved. Appetite decreased. Lost almost 5 kg since last visit here in the office. \par \par 9/4/20: Fatigue, anorexia has improved. Thinks she has gained weight. \par \par 10/1/2020: Ms. Mcintyre presented at the  after having her ct scans and requested to be seen for new onset of swelling and pain LLL. States she noticed it ~ 2 weeks ago and has been keeping her leg elevated for much of the time. There is no erythema. She denies fevers. She states that over the last few days she noticed that her leg feels hard to touch and is tender. No other complaints. Denies injury or fall. \par \par 10/6/20: Last visit, she was referred for doppler which detected DVT- attributes to some trauma after a large dog stepped on her toes. Started on Eliquis which she is tolerated without any issues. CT done on 10/1 showed rt main PE but there was partial response noted on tumor. \par \par 12/15/20: Pt is now on Eliquis for DVT and PE. Tolerating well. Taking Tagrisso with no issues. \par \par 3/16/21: Patient is taking osimertinib (Tagrisso) 40 mg once daily at 10 AM with food (since August /2020). Patient was previously taking 80 mg from 3/27/2020 until 7/31/2020 and was held due to side effects, 40 mg re-started on 8/4/2020. She is tolerating this well with occasional diarrhea and dry skin as the only AE. \par \par 6/11/21: Insomnia- as per family, coughs at night, pt and  not aware. \par \par 9/16/21: No complaints. Went to a dentist and was told that she needs an extraction. She is currently taking Eliquis for hx of PE. No rash or other AEs from TAgrisso\par \par 12/23/21: toe nail changes. No other complaints. \par \par 3/29/22: Presents states toe nail changes are stable/improving. Patient with night time acid reflux symptoms. Denies any rash, skin changes, denies any nausea, vomiting, diarrhea, abdominal pain otherwise. Denies any chest pain or shortness of breath. \par \par 9/8/22: pt reports some globus sensation, especially to solids. Believes she has increased heart burn. Using compression stockings for LE swelling, No nail or skin changes. NOtes increased hair loss. \par \par 12/6/22: LIves in [place]. Travels to [place] frequently. Pt has occasional heart burn. No rash or nail changes. Occasional loose BM [de-identified] : pleomophic,

## 2022-12-11 NOTE — ASSESSMENT
[FreeTextEntry1] : 73 yo w with recently diagnosed lung ca started initially on afatinib 20mg daily from Dec 2017 to April 2020 with new scans showing progression of disease, mostly in the lungs\par PET/CT and brain MRI showed FDG-uptake in all areas noted on the scan convincing for POD. MRI shows no mets\par Peripheral blood NGS did not reveal any EGFR mut. \par She started Osimertinib 80 mg daily beginning of April 2020 at 80 mg daily which she had a hard time handling but is tolerating 40 mg well (since Augist 2021) with no AEs \par Unfortunately, had a DVT and PE as noted on scans on 10/1/20 as noted above. On Eliquis for the same.\par Scans showed a sustained WA, except in 03/2022, when a mild interval increase in spiculated lung mass was noted.\par New scans from September 2022 reviewed independently- shows stable rt lung mass, by document measurement larger but stable to my eye. \par Presented at  and recommendation was for PET and SBRT if oligo progression confirmed.\par Pt returns after PET/CT. \par PET/CT 12/6/22: \par IMPRESSION: Compared to FDG-PET/CT scan dated 4/10/2020:\par \par 1. FDG-avid right lower lobe lung nodule along the major fissure is not significantly changed in size or appearance as compared to CT dated 8/29/2022, and is mildly increased in size and significantly increased in metabolism as compared to prior PET/CT.\par \par 2. Resolution of mildly FDG-avid right middle lobe nodule along right heart border.\par \par 3. Resolution of FDG-avid focus in right adrenal gland.\par \par 4. Remainder of study demonstrates no evidence of FDG-avid disease, no significant change as compared to prior exam.\par \par Given that there is a solitary area of activity and slow POD, will refer to rad onc.\par refer for swallow evaluation\par reviewed notes from ophthalmology, vascular and medicine\par answered all questions

## 2022-12-19 ENCOUNTER — OUTPATIENT (OUTPATIENT)
Dept: OUTPATIENT SERVICES | Facility: HOSPITAL | Age: 72
LOS: 1 days | Discharge: HOME | End: 2022-12-19

## 2022-12-19 ENCOUNTER — APPOINTMENT (OUTPATIENT)
Dept: RADIATION ONCOLOGY | Facility: CLINIC | Age: 72
End: 2022-12-19

## 2022-12-19 DIAGNOSIS — Z98.89 OTHER SPECIFIED POSTPROCEDURAL STATES: Chronic | ICD-10-CM

## 2022-12-19 DIAGNOSIS — Z98.49 CATARACT EXTRACTION STATUS, UNSPECIFIED EYE: Chronic | ICD-10-CM

## 2022-12-19 DIAGNOSIS — M23.91 UNSPECIFIED INTERNAL DERANGEMENT OF RIGHT KNEE: Chronic | ICD-10-CM

## 2022-12-27 ENCOUNTER — APPOINTMENT (OUTPATIENT)
Dept: INTERNAL MEDICINE | Facility: CLINIC | Age: 72
End: 2022-12-27

## 2023-03-01 RX ORDER — OMEPRAZOLE 20 MG/1
20 CAPSULE, DELAYED RELEASE ORAL
Qty: 30 | Refills: 3 | Status: ACTIVE | COMMUNITY
Start: 2022-03-29 | End: 1900-01-01

## 2023-03-22 ENCOUNTER — OUTPATIENT (OUTPATIENT)
Dept: OUTPATIENT SERVICES | Facility: HOSPITAL | Age: 73
LOS: 1 days | Discharge: HOME | End: 2023-03-22

## 2023-03-22 DIAGNOSIS — C34.90 MALIGNANT NEOPLASM OF UNSPECIFIED PART OF UNSPECIFIED BRONCHUS OR LUNG: ICD-10-CM

## 2023-03-22 DIAGNOSIS — Z98.89 OTHER SPECIFIED POSTPROCEDURAL STATES: Chronic | ICD-10-CM

## 2023-03-22 DIAGNOSIS — Z98.49 CATARACT EXTRACTION STATUS, UNSPECIFIED EYE: Chronic | ICD-10-CM

## 2023-03-22 DIAGNOSIS — M23.91 UNSPECIFIED INTERNAL DERANGEMENT OF RIGHT KNEE: Chronic | ICD-10-CM

## 2023-03-24 ENCOUNTER — APPOINTMENT (OUTPATIENT)
Dept: HEMATOLOGY ONCOLOGY | Facility: CLINIC | Age: 73
End: 2023-03-24

## 2023-04-05 ENCOUNTER — APPOINTMENT (OUTPATIENT)
Dept: CARDIOLOGY | Facility: CLINIC | Age: 73
End: 2023-04-05

## 2023-04-25 ENCOUNTER — NON-APPOINTMENT (OUTPATIENT)
Age: 73
End: 2023-04-25

## 2023-04-25 ENCOUNTER — APPOINTMENT (OUTPATIENT)
Dept: RADIATION ONCOLOGY | Facility: CLINIC | Age: 73
End: 2023-04-25
Payer: MEDICARE

## 2023-04-25 VITALS
HEART RATE: 75 BPM | OXYGEN SATURATION: 100 % | BODY MASS INDEX: 31.02 KG/M2 | TEMPERATURE: 98 F | RESPIRATION RATE: 18 BRPM | WEIGHT: 158 LBS | HEIGHT: 60 IN | SYSTOLIC BLOOD PRESSURE: 125 MMHG | DIASTOLIC BLOOD PRESSURE: 70 MMHG

## 2023-04-25 PROCEDURE — 99205 OFFICE O/P NEW HI 60 MIN: CPT | Mod: 25

## 2023-04-25 NOTE — REASON FOR VISIT
[Consideration for Non-Curative Therapy] : consideration for non-curative therapy for [[employer] ] : provided by [employer]   [Interpreters_IDNumber] : [id number] [FreeTextEntry3] : Sign language

## 2023-04-25 NOTE — REVIEW OF SYSTEMS
[Shortness Of Breath] : no shortness of breath [Wheezing] : no wheezing [Cough] : no cough [SOB on Exertion] : no shortness of breath during exertion [Negative] : Allergic/Immunologic

## 2023-04-25 NOTE — HISTORY OF PRESENT ILLNESS
[FreeTextEntry1] : Ms. Mcintyre is a 73-year-old woman with EGFR-mutant non-small cell lung cancer (NSCLC) of the right lower lobe (RLL) who was diagnosed with metastatic disease in 2017, due to bilateral lung nodules, now with stable disease with the exception of the RLL primary, referred to discuss the role of radiation in her management. \par \par 12/4/17 Ct chest: 3.9 x 3.4 cm right lower lobe mass worrisome for primary lung neoplasm. numerous right fissural and pleural nodules suspicious for metastatic disease. Cluster of multiple subcentimeter right cardiophrenic angle lymph nodes which may also represent metastatic disease. Trace right pleural effusion and trace pericardial effusion. 2.3 cm upper anterior mediastinal soft tissue probably represent ectopic thyroid gland given its appearance however, PET scan can be performed for further evaluation given the right lung mass. \par \par Biopsy of a station 7 node was positive for poorly differentiated non-small cell carcinoma. Positive for exon 19 deletion in EGFR gene.\par \par PET/CT 12/22/17 PET/CT: 1. FDG avid lobulated mass in the right lower lobe suspicious for primary neoplasm. Multiple perifissural and pleural nodules are suspicious for metastatic disease. 2. FDG avid right hilar lymphadenopathy and small cardiophrenic lymph nodes \par probably metastatic.  3. Intramuscular FDG avid focus in the 11th intercostal space is \par indeterminate, possibly metastasis. \par \par She was started on afatinib in 2018 \par \par CT chest/A/P 3/19/18: Interval decrease in size of a right lower lobe mass abutting the right major fissure, currently measuring 2.5 x 2 cm. Additional pleural and fissural nodules have resolved or significantly decreased in size. Cardiophrenic angle lymph nodes, decreased in size.\par \par CT C/A/P 06/2018: IMPRESSION: Since March 19, 2018, unchanged size of right lower lobe mass and mild nodularity along the fissures and pleura. \par \par 10/2018-9/2019: Stable disease\par \par 9/13/19 CT C/A/P: Stable 2.5 cm right lower lobe mass. New 8 mm right middle lobe pleural-based nodule. \par \par CT C/A/P 2/19/2020: Interval increase in size of a right lower lobe mass and right middle lobe \par nodule adjacent to the heart border. New nodules are noted in the right lower lobe.\par \par Patient was switched to Tagrisso in 2020\par \par 4/10/2020 PET/CT: 1. FDG avid right middle lobe nodule and right lower lobe nodules, both \par enlarged since CT chest September 13, 2019. Right lower lobe nodule is decreased in size and FDG avidity compared to prior PET/CT December 22, 2017. 2. Punctate FDG avid focus right adrenal gland without CT correlate is indeterminate. Small metastasis is not excluded. \par \par CT C/A/P 7/8/2020: Interval decrease in size of right middle and lower lobe nodules since \par 2/19/2020. New indeterminate 0.5 cm nodular opacities in the left upper and lower lobes \par as described above. \par \par CT C/A/P 3/1/2021: Stable right lower lobe lung mass. No developing adenopathy nor developing parenchymal lung mass. Punctate nonobstructive left renal calculi. Mild diffuse hepatic steatosis.\par \par CVT C/A/P 12/3/21: Stable right lower lobe treated neoplasm. No evidence of new or progressive disease.\par \par 3/17/22 CT C/A/P: Interval increase in size of spiculated right lower lobe mass which measures 2.2 x 2.6 cm, previously measured up to 2.2 cm.\par \par 8/29/22 CT C/A/P: Stable 2.7 x 2.3 cm anterior basal right lower lobe nodule. No thoracic lymphadenopathy. No new nodules.\par \par 12/6/22 PET/CT: Compared to FDG-PET/CT scan dated 4/10/2020: 1. FDG-avid right lower lobe lung nodule along the major fissure is not significantly changed in size or appearance as compared to CT dated 8/29/2022, and is mildly increased in size and significantly increased in metabolism as compared to prior PET/CT. 2. Resolution of mildly FDG-avid right middle lobe nodule along right heart border. 3. Resolution of FDG-avid focus in right adrenal gland. 4. Remainder of study demonstrates no evidence of FDG-avid disease, no significant change as compared to prior exam.\par \par 4/13/23 CT C/A/P: 2 RLL masslike opacities the lareger of which is 3.9cm. Prominent right hilar/right mediastinal lymph nodes without radiographic size enlargement. Prominent endometrial complex\par \par MRI brain 4/21/23: No metastases\par \par Today 4/25/2023  Juan ID # [id number] was utilized for this visit. \par \par  She is feeling well. She denies any shortness of breath, cough or chest pain. She is eating and drinking well with no issues. \par \par

## 2023-04-25 NOTE — PHYSICAL EXAM
[Sclera] : the sclera and conjunctiva were normal [Heart Rate And Rhythm] : heart rate and rhythm were normal [Normal] : normal skin color and pigmentation and no rash [Oriented To Time, Place, And Person] : oriented to person, place, and time

## 2023-04-25 NOTE — VITALS
[Maximal Pain Intensity: 0/10] : 0/10 [90: Able to carry normal activity; minor signs or symptoms of disease.] : 90: Able to carry normal activity; minor signs or symptoms of disease.  [Least Pain Intensity: 0/10] : 0/10 [Date: ____________] : Patient's last distress assessment performed on [unfilled]. [0 - No Distress] : Distress Level: 0

## 2023-04-26 ENCOUNTER — APPOINTMENT (OUTPATIENT)
Dept: VASCULAR SURGERY | Facility: CLINIC | Age: 73
End: 2023-04-26
Payer: MEDICARE

## 2023-04-26 PROCEDURE — 99204 OFFICE O/P NEW MOD 45 MIN: CPT

## 2023-05-22 ENCOUNTER — NON-APPOINTMENT (OUTPATIENT)
Age: 73
End: 2023-05-22

## 2023-05-22 VITALS
OXYGEN SATURATION: 97 % | DIASTOLIC BLOOD PRESSURE: 88 MMHG | HEART RATE: 96 BPM | TEMPERATURE: 98 F | RESPIRATION RATE: 16 BRPM | SYSTOLIC BLOOD PRESSURE: 142 MMHG

## 2023-05-22 NOTE — REVIEW OF SYSTEMS
[Fatigue: Grade 0] : Fatigue: Grade 0 [Cough: Grade 0] : Cough: Grade 0 [Dyspnea: Grade 0] : Dyspnea: Grade 0 [Hoarseness: Grade 0] : Hoarseness: Grade 0

## 2023-05-24 NOTE — DISEASE MANAGEMENT
[Clinical] : TNM Stage: c [IV] : IV [TTNM] : 4 [NTNM] : 3 [MTNM] : 1 [de-identified] : completed 3/5 fractions to a dose of 3000 cGy to the right lung

## 2023-05-24 NOTE — HISTORY OF PRESENT ILLNESS
[FreeTextEntry1] : Ms. Mcintyre presents a 73-year-old woman with EGFR-mutant non-small cell lung cancer (NSCLC) of the right lower lobe (RLL) who was diagnosed with metastatic disease in 2017, due to bilateral lung nodules, now with stable disease with the exception of the RLL primary,\par \par She presents  today for an OTV, completed 3/5 fractions to a dose of 3000 cGy to the right lung. \par \par She is overall feeling well. She denies any pain, breathing issues or skin irritation at the treatment site.

## 2023-05-24 NOTE — PHYSICAL EXAM
[Sclera] : the sclera and conjunctiva were normal [Normal] : normal skin color and pigmentation and no rash [No Focal Deficits] : no focal deficits

## 2023-09-08 NOTE — DISEASE MANAGEMENT
[TTNM] : 4 [NTNM] : 3 [MTNM] : 1 [de-identified] : completed 5/5 fractions to a dose of 5000 cGy to the right lung completed 5/26/23

## 2023-09-08 NOTE — HISTORY OF PRESENT ILLNESS
[FreeTextEntry1] : Ms. Mcintyre is a 73-year-old woman with EGFR-mutant non-small cell lung cancer (NSCLC) of the right lower lobe (RLL) who was diagnosed with metastatic disease in 2017, due to bilateral lung nodules, now with stable disease with the exception of the RLL primary,  now s/p SBRT to the right lung 5 Fxs to 50 Gy completed 5/26/23  The patient did not have a PTE so this is here first visit post TX.    Is she still taking tagrisso  CT scan of CAP at [facility] 8/24/23 Impression  Increased consolodative opacities in the right middle lobe and right lower lobe.  Difficult to determone if progression of disease or radiation cahanges  Small increased right pleural effustion Nonobstructing renal calculi endometrial thickening.

## 2023-09-11 ENCOUNTER — NON-APPOINTMENT (OUTPATIENT)
Age: 73
End: 2023-09-11

## 2023-09-11 ENCOUNTER — APPOINTMENT (OUTPATIENT)
Dept: RADIATION ONCOLOGY | Facility: CLINIC | Age: 73
End: 2023-09-11
Payer: MEDICARE

## 2023-09-11 VITALS
WEIGHT: 152 LBS | BODY MASS INDEX: 29.69 KG/M2 | SYSTOLIC BLOOD PRESSURE: 114 MMHG | RESPIRATION RATE: 16 BRPM | HEART RATE: 81 BPM | OXYGEN SATURATION: 98 % | DIASTOLIC BLOOD PRESSURE: 70 MMHG

## 2023-09-11 PROCEDURE — 99213 OFFICE O/P EST LOW 20 MIN: CPT

## 2023-12-01 ENCOUNTER — TRANSCRIPTION ENCOUNTER (OUTPATIENT)
Age: 73
End: 2023-12-01

## 2024-01-05 ENCOUNTER — APPOINTMENT (OUTPATIENT)
Dept: THORACIC SURGERY | Facility: CLINIC | Age: 74
End: 2024-01-05
Payer: MEDICARE

## 2024-01-05 PROCEDURE — 99204 OFFICE O/P NEW MOD 45 MIN: CPT

## 2024-01-10 ENCOUNTER — APPOINTMENT (OUTPATIENT)
Dept: THORACIC SURGERY | Facility: HOSPITAL | Age: 74
End: 2024-01-10

## 2024-01-10 ENCOUNTER — RESULT REVIEW (OUTPATIENT)
Age: 74
End: 2024-01-10

## 2024-01-12 ENCOUNTER — TRANSCRIPTION ENCOUNTER (OUTPATIENT)
Age: 74
End: 2024-01-12

## 2024-01-17 ENCOUNTER — TRANSCRIPTION ENCOUNTER (OUTPATIENT)
Age: 74
End: 2024-01-17

## 2024-01-26 ENCOUNTER — APPOINTMENT (OUTPATIENT)
Dept: THORACIC SURGERY | Facility: CLINIC | Age: 74
End: 2024-01-26

## 2024-03-13 ENCOUNTER — NON-APPOINTMENT (OUTPATIENT)
Age: 74
End: 2024-03-13

## 2024-04-15 NOTE — PHYSICAL EXAM
[General Appearance - Well Developed] : well developed [Normal Appearance] : normal appearance [Well Groomed] : well groomed [General Appearance - Well Nourished] : well nourished [No Deformities] : no deformities [General Appearance - In No Acute Distress] : no acute distress [Normal Conjunctiva] : the conjunctiva exhibited no abnormalities [Eyelids - No Xanthelasma] : the eyelids demonstrated no xanthelasmas [Normal Jugular Venous A Waves Present] : normal jugular venous A waves present [Normal Jugular Venous V Waves Present] : normal jugular venous V waves present [No Jugular Venous Mancuso A Waves] : no jugular venous mancuso A waves [Respiration, Rhythm And Depth] : normal respiratory rhythm and effort [Exaggerated Use Of Accessory Muscles For Inspiration] : no accessory muscle use [Auscultation Breath Sounds / Voice Sounds] : lungs were clear to auscultation bilaterally [Heart Rate And Rhythm] : heart rate and rhythm were normal [Heart Sounds] : normal S1 and S2 [Murmurs] : no murmurs present [Abdomen Soft] : soft [Abdomen Tenderness] : non-tender [Abdomen Mass (___ Cm)] : no abdominal mass palpated [Abnormal Walk] : normal gait [Gait - Sufficient For Exercise Testing] : the gait was sufficient for exercise testing [Skin Color & Pigmentation] : normal skin color and pigmentation [] : no rash [No Venous Stasis] : no venous stasis [Skin Lesions] : no skin lesions [No Skin Ulcers] : no skin ulcer [No Xanthoma] : no  xanthoma was observed [FreeTextEntry1] : LLE edema.  Controlled.  In a compression garment.  Airborne precautions...

## 2024-04-22 ENCOUNTER — APPOINTMENT (OUTPATIENT)
Dept: RADIATION ONCOLOGY | Facility: CLINIC | Age: 74
End: 2024-04-22
Payer: MEDICARE

## 2024-04-22 VITALS
SYSTOLIC BLOOD PRESSURE: 111 MMHG | RESPIRATION RATE: 16 BRPM | DIASTOLIC BLOOD PRESSURE: 60 MMHG | HEART RATE: 81 BPM | WEIGHT: 143 LBS | BODY MASS INDEX: 27.93 KG/M2 | OXYGEN SATURATION: 99 % | TEMPERATURE: 98 F

## 2024-04-22 DIAGNOSIS — C34.31 MALIGNANT NEOPLASM OF LOWER LOBE, RIGHT BRONCHUS OR LUNG: ICD-10-CM

## 2024-04-22 PROCEDURE — 99213 OFFICE O/P EST LOW 20 MIN: CPT

## 2024-04-22 RX ORDER — OSIMERTINIB 40 1/1
40 TABLET, FILM COATED ORAL
Qty: 30 | Refills: 5 | Status: DISCONTINUED | COMMUNITY
Start: 2020-08-04 | End: 2024-04-22

## 2024-04-22 NOTE — DISEASE MANAGEMENT
[TTNM] : 4 [NTNM] : 3 [MTNM] : 1 [de-identified] : completed 5/5 fractions to a dose of 5000 cGy to the right lung completed 5/26/23

## 2024-04-22 NOTE — PHYSICAL EXAM
[Sclera] : the sclera and conjunctiva were normal [Exaggerated Use Of Accessory Muscles For Inspiration] : no accessory muscle use [] : no respiratory distress [Heart Rate And Rhythm] : heart rate and rhythm were normal [Abdomen Soft] : soft [Nondistended] : nondistended [Abdomen Tenderness] : non-tender [Normal] : oriented to person, place and time, the affect was normal, the mood was normal and not anxious [de-identified] : Patient is hearing impaired and a sign  was used [de-identified] : lungs CTA

## 2024-04-22 NOTE — REVIEW OF SYSTEMS
[Fatigue] : fatigue [SOB on Exertion] : shortness of breath during exertion [Dizziness] : dizziness [Chest Pain] : no chest pain [Palpitations] : no palpitations [Lower Ext Edema] : no lower extremity edema [Shortness Of Breath] : no shortness of breath [Cough] : no cough [Constipation] : no constipation [Diarrhea] : no diarrhea [Negative] : Allergic/Immunologic

## 2024-04-22 NOTE — HISTORY OF PRESENT ILLNESS
[FreeTextEntry1] : Patient is hearing impaired.  [id number] assisted with this viist.   Ms. 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  Cytology was negative for malignant cells  The patient met with Dr Goldberg on 1/22/24 and was found to have slow progression on Tagrisso.  Dr Goldberg favored Alimta, carbo and Keytruda.  Chemotherapy was planned to start 2/13/24.  today 4/22/24 she is here for f/u.   She had a a CT of the CAP at [facility] on 3/19/24 that showed: No metastatic disease int he abdomen or pelvis Right partial loculated hydropneumothorax smaller that prior exam. Increase pleural thickening in the right hemothorax.  consolidation changes in the right lower lobe have increased.  She endorses that she had a thoracentesis on 3/17 for SOB and is feeling much better now with no more SOB noted.  She also reports that her appetite has been OK but she has to force her self to eat and drink sometimes.  She also gets dizzy when she does not drink enough fluids

## 2024-08-06 ENCOUNTER — RESULT REVIEW (OUTPATIENT)
Age: 74
End: 2024-08-06

## 2024-08-06 ENCOUNTER — TRANSCRIPTION ENCOUNTER (OUTPATIENT)
Age: 74
End: 2024-08-06